# Patient Record
Sex: FEMALE | Race: WHITE | NOT HISPANIC OR LATINO | Employment: OTHER | URBAN - METROPOLITAN AREA
[De-identification: names, ages, dates, MRNs, and addresses within clinical notes are randomized per-mention and may not be internally consistent; named-entity substitution may affect disease eponyms.]

---

## 2023-04-04 ENCOUNTER — NURSING HOME VISIT (OUTPATIENT)
Dept: POST ACUTE CARE | Facility: EXTERNAL LOCATION | Age: 68
End: 2023-04-04
Payer: MEDICARE

## 2023-04-04 DIAGNOSIS — R52 PAIN: ICD-10-CM
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
## 2023-04-04 DIAGNOSIS — F32.A DEPRESSION, UNSPECIFIED DEPRESSION TYPE: ICD-10-CM

## 2023-04-04 DIAGNOSIS — F41.9 ANXIETY: ICD-10-CM

## 2023-04-04 DIAGNOSIS — S82.161D CLOSED TORUS FRACTURE OF PROXIMAL END OF RIGHT TIBIA WITH ROUTINE HEALING, SUBSEQUENT ENCOUNTER: ICD-10-CM

## 2023-04-04 DIAGNOSIS — J41.0 SIMPLE CHRONIC BRONCHITIS (MULTI): ICD-10-CM

## 2023-04-04 PROCEDURE — 99305 1ST NF CARE MODERATE MDM 35: CPT | Performed by: STUDENT IN AN ORGANIZED HEALTH CARE EDUCATION/TRAINING PROGRAM

## 2023-04-04 NOTE — LETTER
Patient: Mary Marsh  : 1955    Encounter Date: 2023    H&P    Subjective  Patient ID: Mary Marsh is a 67 y.o. female.    Pt is a 67 year old female with COPD, HTN, GERD, ambulatory dysfunction, HLD who presented to [facility] s/p fall. Patient was ambulating and noted to have pain in her knee. Felt a popping sensation. Did present herself to the ED however was discharged home. Patient was then ambulating with her walker again and then had a mechanical fall. Presented to the ED and then was found to have a hairline fracture of the right tibia. Patient was deemed non operative per orthopedics and recommended knee immobilizer. Patient was then advised snf and discharged in stable condition. On evaluation, patient overall feels well without any complaints. States that she feels her pain is well controlled and states no additional issues at this time.         Review of Systems   Constitutional: Negative.    HENT: Negative.     Respiratory: Negative.     Cardiovascular: Negative.    Gastrointestinal: Negative.    Musculoskeletal:  Positive for arthralgias.   Neurological: Negative.    Psychiatric/Behavioral: Negative.         ObjectiveVitals Reviewed via facility EMR   Physical Exam  Constitutional:       General: She is not in acute distress.     Appearance: She is not ill-appearing.   HENT:      Mouth/Throat:      Mouth: Mucous membranes are moist.      Pharynx: Oropharynx is clear. No oropharyngeal exudate or posterior oropharyngeal erythema.   Eyes:      Pupils: Pupils are equal, round, and reactive to light.   Cardiovascular:      Rate and Rhythm: Normal rate and regular rhythm.      Heart sounds: No murmur heard.  Pulmonary:      Effort: Pulmonary effort is normal. No respiratory distress.      Breath sounds: No wheezing.   Abdominal:      General: Abdomen is flat. Bowel sounds are normal. There is no distension.      Palpations: Abdomen is soft.      Tenderness: There is no abdominal  tenderness.   Musculoskeletal:         General: No swelling or tenderness. Normal range of motion.      Comments: In knee immbolilizer   Skin:     General: Skin is warm and dry.   Neurological:      General: No focal deficit present.      Mental Status: She is alert and oriented to person, place, and time. Mental status is at baseline.   Psychiatric:         Mood and Affect: Mood normal.         Assessment/Plan  Diagnoses and all orders for this visit:  Fall, sequela  Closed torus fracture of proximal end of right tibia with routine healing, subsequent encounter  Pain  Depression, unspecified depression type  Anxiety  Simple chronic bronchitis (CMS/HCC)  Patient seen and evaluated, hospital course reviewed. Patient overall doing well s/p hospitalization. Continue Pt/OT, monitor weekly labs, overall stable at this time, medications reviewed, discussed with ANT        Electronically Signed By: Anupam oV DO   4/5/23  8:01 PM

## 2023-04-05 PROBLEM — F41.9 ANXIETY: Status: ACTIVE | Noted: 2023-04-05

## 2023-04-05 PROBLEM — S82.101D CLOSED FRACTURE OF PROXIMAL END OF RIGHT TIBIA WITH ROUTINE HEALING: Status: ACTIVE | Noted: 2023-04-05

## 2023-04-05 PROBLEM — R52 PAIN: Status: ACTIVE | Noted: 2023-04-05

## 2023-04-05 PROBLEM — J41.0 SIMPLE CHRONIC BRONCHITIS (MULTI): Status: ACTIVE | Noted: 2023-04-05

## 2023-04-05 PROBLEM — F32.A DEPRESSION: Status: ACTIVE | Noted: 2023-04-05
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
## 2023-04-05 ASSESSMENT — ENCOUNTER SYMPTOMS
ARTHRALGIAS: 1
GASTROINTESTINAL NEGATIVE: 1
NEUROLOGICAL NEGATIVE: 1
CONSTITUTIONAL NEGATIVE: 1
RESPIRATORY NEGATIVE: 1
CARDIOVASCULAR NEGATIVE: 1
PSYCHIATRIC NEGATIVE: 1

## 2023-04-05 NOTE — PROGRESS NOTES
H&P    Subjective   Patient ID: Mary Marsh is a 67 y.o. female.    Pt is a 67 year old female with COPD, HTN, GERD, ambulatory dysfunction, HLD who presented to [facility] s/p fall. Patient was ambulating and noted to have pain in her knee. Felt a popping sensation. Did present herself to the ED however was discharged home. Patient was then ambulating with her walker again and then had a mechanical fall. Presented to the ED and then was found to have a hairline fracture of the right tibia. Patient was deemed non operative per orthopedics and recommended knee immobilizer. Patient was then advised snf and discharged in stable condition. On evaluation, patient overall feels well without any complaints. States that she feels her pain is well controlled and states no additional issues at this time.         Review of Systems   Constitutional: Negative.    HENT: Negative.     Respiratory: Negative.     Cardiovascular: Negative.    Gastrointestinal: Negative.    Musculoskeletal:  Positive for arthralgias.   Neurological: Negative.    Psychiatric/Behavioral: Negative.         Objective Vitals Reviewed via facility EMR   Physical Exam  Constitutional:       General: She is not in acute distress.     Appearance: She is not ill-appearing.   HENT:      Mouth/Throat:      Mouth: Mucous membranes are moist.      Pharynx: Oropharynx is clear. No oropharyngeal exudate or posterior oropharyngeal erythema.   Eyes:      Pupils: Pupils are equal, round, and reactive to light.   Cardiovascular:      Rate and Rhythm: Normal rate and regular rhythm.      Heart sounds: No murmur heard.  Pulmonary:      Effort: Pulmonary effort is normal. No respiratory distress.      Breath sounds: No wheezing.   Abdominal:      General: Abdomen is flat. Bowel sounds are normal. There is no distension.      Palpations: Abdomen is soft.      Tenderness: There is no abdominal tenderness.   Musculoskeletal:         General: No swelling or tenderness.  Normal range of motion.      Comments: In knee immbolilizer   Skin:     General: Skin is warm and dry.   Neurological:      General: No focal deficit present.      Mental Status: She is alert and oriented to person, place, and time. Mental status is at baseline.   Psychiatric:         Mood and Affect: Mood normal.         Assessment/Plan   Diagnoses and all orders for this visit:  Fall, sequela  Closed torus fracture of proximal end of right tibia with routine healing, subsequent encounter  Pain  Depression, unspecified depression type  Anxiety  Simple chronic bronchitis (CMS/HCC)  Patient seen and evaluated, hospital course reviewed. Patient overall doing well s/p hospitalization. Continue Pt/OT, monitor weekly labs, overall stable at this time, medications reviewed, discussed with ANT

## 2023-04-12 ENCOUNTER — NURSING HOME VISIT (OUTPATIENT)
Dept: POST ACUTE CARE | Facility: EXTERNAL LOCATION | Age: 68
End: 2023-04-12
Payer: MEDICARE

## 2023-04-12 DIAGNOSIS — S82.161D CLOSED TORUS FRACTURE OF PROXIMAL END OF RIGHT TIBIA WITH ROUTINE HEALING, SUBSEQUENT ENCOUNTER: ICD-10-CM

## 2023-04-12 DIAGNOSIS — R52 PAIN: Primary | ICD-10-CM

## 2023-04-12 DIAGNOSIS — F41.9 ANXIETY: ICD-10-CM
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
## 2023-04-12 PROCEDURE — 99308 SBSQ NF CARE LOW MDM 20: CPT | Performed by: STUDENT IN AN ORGANIZED HEALTH CARE EDUCATION/TRAINING PROGRAM

## 2023-04-12 ASSESSMENT — ENCOUNTER SYMPTOMS
CONSTITUTIONAL NEGATIVE: 1
GASTROINTESTINAL NEGATIVE: 1
MUSCULOSKELETAL NEGATIVE: 1
RESPIRATORY NEGATIVE: 1
NEUROLOGICAL NEGATIVE: 1
CARDIOVASCULAR NEGATIVE: 1
PSYCHIATRIC NEGATIVE: 1

## 2023-04-12 NOTE — LETTER
Patient: Mary Marsh  : 1955    Encounter Date: 2023    Subjective  Patient ID: Mary Marsh is a 67 y.o. female.    Pt seen and evaluated. Overall feels well, pain well controlled and feels overall well. Working well with therapy. States no other complaints at this time.         Review of Systems   Constitutional: Negative.    HENT: Negative.     Respiratory: Negative.     Cardiovascular: Negative.    Gastrointestinal: Negative.    Musculoskeletal: Negative.    Neurological: Negative.    Psychiatric/Behavioral: Negative.         ObjectiveVitals Reviewed via facility EMR   Physical Exam  Constitutional:       General: She is not in acute distress.     Appearance: She is not ill-appearing.   HENT:      Mouth/Throat:      Mouth: Mucous membranes are moist.      Pharynx: Oropharynx is clear. No oropharyngeal exudate or posterior oropharyngeal erythema.   Eyes:      Pupils: Pupils are equal, round, and reactive to light.   Cardiovascular:      Rate and Rhythm: Normal rate and regular rhythm.      Heart sounds: No murmur heard.  Pulmonary:      Effort: Pulmonary effort is normal. No respiratory distress.      Breath sounds: No wheezing.   Abdominal:      General: Abdomen is flat. Bowel sounds are normal. There is no distension.      Palpations: Abdomen is soft.      Tenderness: There is no abdominal tenderness.   Musculoskeletal:         General: No tenderness. Normal range of motion.      Comments: Knee imbolizer in place   Skin:     General: Skin is warm and dry.   Neurological:      General: No focal deficit present.      Mental Status: She is alert and oriented to person, place, and time. Mental status is at baseline.   Psychiatric:         Mood and Affect: Mood normal.         Assessment/Plan  Diagnoses and all orders for this visit:  Pain  Closed torus fracture of proximal end of right tibia with routine healing, subsequent encounter  Fall, sequela  Anxiety    Patient seen and evaluated,  overall doing well. Continue supportive care. Pt/OT, staff updated with care plan      Electronically Signed By: Anupam Vo DO   4/12/23  7:51 PM

## 2023-04-12 NOTE — PROGRESS NOTES
Subjective   Patient ID: Mary Marsh is a 67 y.o. female.    Pt seen and evaluated. Overall feels well, pain well controlled and feels overall well. Working well with therapy. States no other complaints at this time.         Review of Systems   Constitutional: Negative.    HENT: Negative.     Respiratory: Negative.     Cardiovascular: Negative.    Gastrointestinal: Negative.    Musculoskeletal: Negative.    Neurological: Negative.    Psychiatric/Behavioral: Negative.         Objective Vitals Reviewed via facility EMR   Physical Exam  Constitutional:       General: She is not in acute distress.     Appearance: She is not ill-appearing.   HENT:      Mouth/Throat:      Mouth: Mucous membranes are moist.      Pharynx: Oropharynx is clear. No oropharyngeal exudate or posterior oropharyngeal erythema.   Eyes:      Pupils: Pupils are equal, round, and reactive to light.   Cardiovascular:      Rate and Rhythm: Normal rate and regular rhythm.      Heart sounds: No murmur heard.  Pulmonary:      Effort: Pulmonary effort is normal. No respiratory distress.      Breath sounds: No wheezing.   Abdominal:      General: Abdomen is flat. Bowel sounds are normal. There is no distension.      Palpations: Abdomen is soft.      Tenderness: There is no abdominal tenderness.   Musculoskeletal:         General: No tenderness. Normal range of motion.      Comments: Knee imbolizer in place   Skin:     General: Skin is warm and dry.   Neurological:      General: No focal deficit present.      Mental Status: She is alert and oriented to person, place, and time. Mental status is at baseline.   Psychiatric:         Mood and Affect: Mood normal.         Assessment/Plan   Diagnoses and all orders for this visit:  Pain  Closed torus fracture of proximal end of right tibia with routine healing, subsequent encounter  Fall, sequela  Anxiety    Patient seen and evaluated, overall doing well. Continue supportive care. Pt/OT, staff updated with  care plan

## 2023-04-25 ENCOUNTER — NURSING HOME VISIT (OUTPATIENT)
Dept: POST ACUTE CARE | Facility: EXTERNAL LOCATION | Age: 68
End: 2023-04-25
Payer: MEDICARE

## 2023-04-25 DIAGNOSIS — S82.161D CLOSED TORUS FRACTURE OF PROXIMAL END OF RIGHT TIBIA WITH ROUTINE HEALING, SUBSEQUENT ENCOUNTER: ICD-10-CM

## 2023-04-25 DIAGNOSIS — J41.0 SIMPLE CHRONIC BRONCHITIS (MULTI): ICD-10-CM

## 2023-04-25 DIAGNOSIS — F41.9 ANXIETY: ICD-10-CM
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
## 2023-04-25 DIAGNOSIS — R52 PAIN: Primary | ICD-10-CM

## 2023-04-25 DIAGNOSIS — F32.A DEPRESSION, UNSPECIFIED DEPRESSION TYPE: ICD-10-CM

## 2023-04-25 PROCEDURE — 99308 SBSQ NF CARE LOW MDM 20: CPT | Performed by: STUDENT IN AN ORGANIZED HEALTH CARE EDUCATION/TRAINING PROGRAM

## 2023-04-25 ASSESSMENT — ENCOUNTER SYMPTOMS
NEUROLOGICAL NEGATIVE: 1
CARDIOVASCULAR NEGATIVE: 1
MUSCULOSKELETAL NEGATIVE: 1
CONSTITUTIONAL NEGATIVE: 1
PSYCHIATRIC NEGATIVE: 1
GASTROINTESTINAL NEGATIVE: 1
RESPIRATORY NEGATIVE: 1

## 2023-04-25 NOTE — LETTER
Patient: Mary Marsh  : 1955    Encounter Date: 2023    Subjective  Patient ID: Mary Marsh is a 67 y.o. female.    Patient seen and evaluated.  Overall doing well with no complaints.  Working well with therapy.  States no additional issues or concerns.        Review of Systems   Constitutional: Negative.    HENT: Negative.     Respiratory: Negative.     Cardiovascular: Negative.    Gastrointestinal: Negative.    Musculoskeletal: Negative.    Neurological: Negative.    Psychiatric/Behavioral: Negative.         ObjectiveVitals Reviewed via facility EMR   Physical Exam  Constitutional:       General: She is not in acute distress.     Appearance: She is not ill-appearing.   HENT:      Mouth/Throat:      Mouth: Mucous membranes are moist.      Pharynx: Oropharynx is clear. No oropharyngeal exudate or posterior oropharyngeal erythema.   Eyes:      Pupils: Pupils are equal, round, and reactive to light.   Cardiovascular:      Rate and Rhythm: Normal rate and regular rhythm.      Heart sounds: No murmur heard.  Pulmonary:      Effort: Pulmonary effort is normal. No respiratory distress.      Breath sounds: No wheezing.   Abdominal:      General: Abdomen is flat. Bowel sounds are normal. There is no distension.      Palpations: Abdomen is soft.      Tenderness: There is no abdominal tenderness.   Musculoskeletal:         General: No tenderness. Normal range of motion.      Comments: Knee imbolizer   Skin:     General: Skin is warm and dry.   Neurological:      General: No focal deficit present.      Mental Status: She is alert and oriented to person, place, and time. Mental status is at baseline.   Psychiatric:         Mood and Affect: Mood normal.         Assessment/Plan  Diagnoses and all orders for this visit:  Pain  Simple chronic bronchitis (CMS/HCC)  Closed torus fracture of proximal end of right tibia with routine healing, subsequent encounter  Fall, sequela  Depression, unspecified  depression type  Anxiety      Patient seen and evaluated, overall medically stable, labs reviewed, continue supportive care, PT OT.      Electronically Signed By: Anupam Vo DO   4/25/23  9:40 PM

## 2023-04-26 NOTE — PROGRESS NOTES
Subjective   Patient ID: Mary Marsh is a 67 y.o. female.    Patient seen and evaluated.  Overall doing well with no complaints.  Working well with therapy.  States no additional issues or concerns.        Review of Systems   Constitutional: Negative.    HENT: Negative.     Respiratory: Negative.     Cardiovascular: Negative.    Gastrointestinal: Negative.    Musculoskeletal: Negative.    Neurological: Negative.    Psychiatric/Behavioral: Negative.         Objective Vitals Reviewed via facility EMR   Physical Exam  Constitutional:       General: She is not in acute distress.     Appearance: She is not ill-appearing.   HENT:      Mouth/Throat:      Mouth: Mucous membranes are moist.      Pharynx: Oropharynx is clear. No oropharyngeal exudate or posterior oropharyngeal erythema.   Eyes:      Pupils: Pupils are equal, round, and reactive to light.   Cardiovascular:      Rate and Rhythm: Normal rate and regular rhythm.      Heart sounds: No murmur heard.  Pulmonary:      Effort: Pulmonary effort is normal. No respiratory distress.      Breath sounds: No wheezing.   Abdominal:      General: Abdomen is flat. Bowel sounds are normal. There is no distension.      Palpations: Abdomen is soft.      Tenderness: There is no abdominal tenderness.   Musculoskeletal:         General: No tenderness. Normal range of motion.      Comments: Knee imbolizer   Skin:     General: Skin is warm and dry.   Neurological:      General: No focal deficit present.      Mental Status: She is alert and oriented to person, place, and time. Mental status is at baseline.   Psychiatric:         Mood and Affect: Mood normal.         Assessment/Plan   Diagnoses and all orders for this visit:  Pain  Simple chronic bronchitis (CMS/HCC)  Closed torus fracture of proximal end of right tibia with routine healing, subsequent encounter  Fall, sequela  Depression, unspecified depression type  Anxiety      Patient seen and evaluated, overall medically  stable, labs reviewed, continue supportive care, PT OT.

## 2023-05-10 PROCEDURE — G0180 MD CERTIFICATION HHA PATIENT: HCPCS | Performed by: STUDENT IN AN ORGANIZED HEALTH CARE EDUCATION/TRAINING PROGRAM

## 2023-05-11 ENCOUNTER — TELEPHONE (OUTPATIENT)
Dept: PRIMARY CARE | Facility: CLINIC | Age: 68
End: 2023-05-11
Payer: MEDICARE

## 2023-05-11 NOTE — TELEPHONE ENCOUNTER
Says she is recently home from [facility]  Says she need a referral to pulmonary and has question about her bloodwork  [phone]

## 2023-05-12 DIAGNOSIS — R06.02 SHORTNESS OF BREATH: Primary | ICD-10-CM

## 2023-05-15 ENCOUNTER — TELEPHONE (OUTPATIENT)
Dept: PRIMARY CARE | Facility: CLINIC | Age: 68
End: 2023-05-15
Payer: MEDICARE

## 2023-05-15 DIAGNOSIS — I10 HYPERTENSION, UNSPECIFIED TYPE: Primary | ICD-10-CM

## 2023-05-15 NOTE — TELEPHONE ENCOUNTER
Was at [facility]. She has bleeding with bowels. What were her stool results.  Oxygen and Heart rate have been down but ortho said she was ok. Should she make appt with pulmonary?

## 2023-05-17 NOTE — TELEPHONE ENCOUNTER
Missed your call from yesterday and would llike for you  to call back and after 3:30pm. [phone]

## 2023-05-25 RX ORDER — BENAZEPRIL HYDROCHLORIDE 40 MG/1
1 TABLET ORAL DAILY
COMMUNITY
Start: 2014-02-14 | End: 2023-05-25 | Stop reason: SDUPTHER

## 2023-05-26 RX ORDER — BENAZEPRIL HYDROCHLORIDE 40 MG/1
40 TABLET ORAL DAILY
Qty: 90 TABLET | Refills: 1 | Status: SHIPPED | OUTPATIENT
Start: 2023-05-26 | End: 2023-11-13

## 2023-07-20 ENCOUNTER — OFFICE VISIT (OUTPATIENT)
Dept: PRIMARY CARE | Facility: CLINIC | Age: 68
End: 2023-07-20
Payer: MEDICARE

## 2023-07-20 VITALS — SYSTOLIC BLOOD PRESSURE: 122 MMHG | DIASTOLIC BLOOD PRESSURE: 70 MMHG

## 2023-07-20 DIAGNOSIS — D64.9 ANEMIA, UNSPECIFIED TYPE: ICD-10-CM

## 2023-07-20 DIAGNOSIS — G62.9 NEUROPATHY: Primary | ICD-10-CM

## 2023-07-20 DIAGNOSIS — L29.9 PRURITUS, UNSPECIFIED: ICD-10-CM

## 2023-07-20 DIAGNOSIS — I10 HYPERTENSION, UNSPECIFIED TYPE: ICD-10-CM

## 2023-07-20 DIAGNOSIS — L29.9 PRURITUS: ICD-10-CM

## 2023-07-20 DIAGNOSIS — N39.498 OTHER URINARY INCONTINENCE: ICD-10-CM

## 2023-07-20 PROBLEM — M25.551 HIP PAIN, RIGHT: Status: ACTIVE | Noted: 2023-07-20

## 2023-07-20 PROBLEM — M12.579 TRAUMATIC ARTHROPATHY OF ANKLE: Status: ACTIVE | Noted: 2023-07-20

## 2023-07-20 PROBLEM — K13.79 MOUTH SORE: Status: ACTIVE | Noted: 2023-07-20

## 2023-07-20 PROBLEM — R63.4 WEIGHT LOSS: Status: ACTIVE | Noted: 2023-07-20

## 2023-07-20 PROBLEM — L73.9 FOLLICULITIS: Status: ACTIVE | Noted: 2023-07-20

## 2023-07-20 PROBLEM — D12.6 ADENOMA OF COLON: Status: ACTIVE | Noted: 2023-07-20

## 2023-07-20 PROBLEM — R79.89 HIGH SERUM CHOLESTANOL: Status: ACTIVE | Noted: 2023-07-20

## 2023-07-20 PROBLEM — E55.9 VITAMIN D DEFICIENCY: Status: ACTIVE | Noted: 2023-07-20

## 2023-07-20 PROBLEM — Q78.2 BONY SCLEROSIS (HHS-HCC): Status: ACTIVE | Noted: 2023-07-20

## 2023-07-20 PROBLEM — S82.209A TIBIA FRACTURE: Status: ACTIVE | Noted: 2023-07-20

## 2023-07-20 PROBLEM — M84.369A STRESS REACTION OF TIBIA: Status: ACTIVE | Noted: 2023-07-20

## 2023-07-20 PROBLEM — E87.6 HYPOKALEMIA: Status: ACTIVE | Noted: 2023-07-20

## 2023-07-20 PROBLEM — J44.9 COPD (CHRONIC OBSTRUCTIVE PULMONARY DISEASE) (MULTI): Status: ACTIVE | Noted: 2023-07-20

## 2023-07-20 PROBLEM — R22.31 MASS OF RIGHT AXILLA: Status: ACTIVE | Noted: 2023-07-20

## 2023-07-20 PROBLEM — M79.606 LEG PAIN: Status: ACTIVE | Noted: 2023-07-20

## 2023-07-20 PROBLEM — M25.569 KNEE PAIN: Status: ACTIVE | Noted: 2023-07-20

## 2023-07-20 PROBLEM — R19.7 DIARRHEA FOLLOWING GASTROINTESTINAL SURGERY: Status: ACTIVE | Noted: 2023-07-20

## 2023-07-20 PROBLEM — D35.00 ADRENAL ADENOMA: Status: ACTIVE | Noted: 2023-07-20

## 2023-07-20 PROBLEM — R68.81 EARLY SATIETY: Status: ACTIVE | Noted: 2023-07-20

## 2023-07-20 PROBLEM — F43.0 STRESS REACTION: Status: ACTIVE | Noted: 2023-07-20

## 2023-07-20 PROBLEM — R06.00 DYSPNEA: Status: ACTIVE | Noted: 2023-07-20

## 2023-07-20 PROBLEM — R26.2 AMBULATORY DYSFUNCTION: Status: ACTIVE | Noted: 2023-07-20

## 2023-07-20 PROBLEM — R05.9 COUGH: Status: ACTIVE | Noted: 2023-07-20

## 2023-07-20 PROBLEM — M84.361A STRESS FRACTURE OF RIGHT TIBIA: Status: ACTIVE | Noted: 2023-07-20

## 2023-07-20 PROBLEM — M54.16 LUMBAR RADICULOPATHY: Status: ACTIVE | Noted: 2023-07-20

## 2023-07-20 PROBLEM — R10.9 ABDOMINAL PAIN: Status: ACTIVE | Noted: 2023-07-20

## 2023-07-20 PROBLEM — Z98.890 DIARRHEA FOLLOWING GASTROINTESTINAL SURGERY: Status: ACTIVE | Noted: 2023-07-20

## 2023-07-20 PROBLEM — R07.89 CHEST PAIN, ATYPICAL: Status: ACTIVE | Noted: 2023-07-20

## 2023-07-20 PROBLEM — M17.10 PRIMARY LOCALIZED OSTEOARTHRITIS OF KNEE: Status: ACTIVE | Noted: 2023-07-20

## 2023-07-20 PROCEDURE — 99214 OFFICE O/P EST MOD 30 MIN: CPT | Performed by: STUDENT IN AN ORGANIZED HEALTH CARE EDUCATION/TRAINING PROGRAM

## 2023-07-20 PROCEDURE — 1036F TOBACCO NON-USER: CPT | Performed by: STUDENT IN AN ORGANIZED HEALTH CARE EDUCATION/TRAINING PROGRAM

## 2023-07-20 PROCEDURE — 3078F DIAST BP <80 MM HG: CPT | Performed by: STUDENT IN AN ORGANIZED HEALTH CARE EDUCATION/TRAINING PROGRAM

## 2023-07-20 PROCEDURE — 1160F RVW MEDS BY RX/DR IN RCRD: CPT | Performed by: STUDENT IN AN ORGANIZED HEALTH CARE EDUCATION/TRAINING PROGRAM

## 2023-07-20 PROCEDURE — 3074F SYST BP LT 130 MM HG: CPT | Performed by: STUDENT IN AN ORGANIZED HEALTH CARE EDUCATION/TRAINING PROGRAM

## 2023-07-20 PROCEDURE — 1159F MED LIST DOCD IN RCRD: CPT | Performed by: STUDENT IN AN ORGANIZED HEALTH CARE EDUCATION/TRAINING PROGRAM

## 2023-07-20 RX ORDER — FLUOXETINE HYDROCHLORIDE 20 MG/1
CAPSULE ORAL
COMMUNITY

## 2023-07-20 RX ORDER — VALACYCLOVIR HYDROCHLORIDE 1 G/1
TABLET, FILM COATED ORAL
COMMUNITY
Start: 2022-01-07 | End: 2023-07-20 | Stop reason: ALTCHOICE

## 2023-07-20 RX ORDER — QUETIAPINE FUMARATE 25 MG/1
TABLET, FILM COATED ORAL
COMMUNITY
End: 2023-07-20 | Stop reason: ALTCHOICE

## 2023-07-20 RX ORDER — FERROUS SULFATE 325(65) MG
1 TABLET ORAL DAILY
COMMUNITY
End: 2023-08-25

## 2023-07-20 RX ORDER — OMEPRAZOLE 40 MG/1
1 CAPSULE, DELAYED RELEASE ORAL DAILY
COMMUNITY
Start: 2022-01-31 | End: 2023-07-20 | Stop reason: ALTCHOICE

## 2023-07-20 RX ORDER — HYDROCORTISONE ACETATE PRAMOXINE HCL 2.5; 1 G/100G; G/100G
CREAM TOPICAL
COMMUNITY
Start: 2023-06-20 | End: 2023-07-20 | Stop reason: ALTCHOICE

## 2023-07-20 RX ORDER — LORAZEPAM 1 MG/1
1 TABLET ORAL 2 TIMES DAILY
COMMUNITY

## 2023-07-20 RX ORDER — LAMOTRIGINE 150 MG/1
1 TABLET ORAL DAILY
COMMUNITY

## 2023-07-20 RX ORDER — OXYCODONE AND ACETAMINOPHEN 5; 325 MG/1; MG/1
TABLET ORAL
COMMUNITY
Start: 2023-05-04 | End: 2023-07-20 | Stop reason: ALTCHOICE

## 2023-07-20 RX ORDER — TOPIRAMATE 50 MG/1
TABLET, FILM COATED ORAL 2 TIMES DAILY
COMMUNITY
End: 2023-07-20 | Stop reason: ALTCHOICE

## 2023-07-20 RX ORDER — ERGOCALCIFEROL 1.25 MG/1
1 CAPSULE ORAL
COMMUNITY
End: 2023-07-28

## 2023-07-20 RX ORDER — PANTOPRAZOLE SODIUM 40 MG/1
1 TABLET, DELAYED RELEASE ORAL DAILY
COMMUNITY
Start: 2022-02-02 | End: 2023-09-18 | Stop reason: SDUPTHER

## 2023-07-20 RX ORDER — POTASSIUM CHLORIDE 1500 MG/1
1 TABLET, EXTENDED RELEASE ORAL DAILY
COMMUNITY
Start: 2019-03-28 | End: 2023-12-15 | Stop reason: SDUPTHER

## 2023-07-20 RX ORDER — GABAPENTIN 100 MG/1
100 CAPSULE ORAL NIGHTLY
Qty: 90 CAPSULE | Refills: 0 | Status: SHIPPED | OUTPATIENT
Start: 2023-07-20 | End: 2023-10-18

## 2023-07-20 RX ORDER — MIRTAZAPINE 15 MG/1
0.5 TABLET, FILM COATED ORAL NIGHTLY
COMMUNITY
Start: 2021-06-08 | End: 2023-07-20 | Stop reason: ALTCHOICE

## 2023-07-20 RX ORDER — POTASSIUM CHLORIDE 1500 MG/1
TABLET, EXTENDED RELEASE ORAL
COMMUNITY
Start: 2022-02-02 | End: 2024-03-05 | Stop reason: SDUPTHER

## 2023-07-20 RX ORDER — ACETAMINOPHEN 325 MG/1
TABLET ORAL EVERY 6 HOURS
COMMUNITY
Start: 2019-06-13

## 2023-07-20 RX ORDER — MIRTAZAPINE 7.5 MG/1
TABLET, FILM COATED ORAL
COMMUNITY
Start: 2023-06-06

## 2023-07-20 RX ORDER — AMLODIPINE BESYLATE 5 MG/1
5 TABLET ORAL DAILY
Qty: 90 TABLET | Refills: 0 | Status: SHIPPED | OUTPATIENT
Start: 2023-07-20 | End: 2024-03-06 | Stop reason: ALTCHOICE

## 2023-07-20 RX ORDER — DICYCLOMINE HYDROCHLORIDE 10 MG/1
1 CAPSULE ORAL EVERY 6 HOURS PRN
COMMUNITY
Start: 2021-06-07 | End: 2023-11-07 | Stop reason: SDUPTHER

## 2023-07-20 RX ORDER — TOPIRAMATE SPINKLE 25 MG/1
CAPSULE ORAL
COMMUNITY
End: 2023-08-06

## 2023-07-20 ASSESSMENT — ENCOUNTER SYMPTOMS
DIZZINESS: 0
FEVER: 0
SORE THROAT: 0
COUGH: 0
ABDOMINAL PAIN: 0
VOMITING: 0
JOINT SWELLING: 0
EYE PAIN: 0
ROS SKIN COMMENTS: ITCHING
CHILLS: 0
DYSURIA: 0
EYE ITCHING: 1
NERVOUS/ANXIOUS: 1
HEADACHES: 1
SHORTNESS OF BREATH: 0
LIGHT-HEADEDNESS: 0

## 2023-07-20 NOTE — PROGRESS NOTES
"Subjective   Patient ID: Mary Marsh is a 67 y.o. female who presents for Follow-up and Med Refill.    Patient presents for follow up and med refill. Son present. She mentions of \"burning\" all over and occasional episodes of generalized pruritus. This has been going on for some time. She has not tried any medications for this. Denies hives, rash. She also mentions of nocturia with 3-4 episodes per night. Patient experiences urgency, as in the feeling that she needs to get to the bathroom quickly. She wears depends in case. She has intermittent anxiety. Denies recent falls. Denies dysuria, fever. She develops a headache about 15 minutes after taking her blood pressure medications. Patient takes advil with relief of her headache. Denies lightheadedness, dizziness, syncope.     Review of Systems   Constitutional:  Negative for chills and fever.   HENT:  Negative for ear pain and sore throat.    Eyes:  Positive for itching. Negative for pain.   Respiratory:  Negative for cough and shortness of breath.    Gastrointestinal:  Negative for abdominal pain and vomiting.   Genitourinary:  Positive for urgency. Negative for dysuria.        Nocturia   Musculoskeletal:  Negative for joint swelling.   Skin:  Negative for rash.        itching   Neurological:  Positive for headaches. Negative for dizziness, syncope and light-headedness.   Psychiatric/Behavioral:  The patient is nervous/anxious.    All other systems reviewed and are negative.      Objective   /70     Physical Exam  Vitals and nursing note reviewed.   Constitutional:       General: She is not in acute distress.     Appearance: She is not ill-appearing or toxic-appearing.   HENT:      Head: Normocephalic and atraumatic.      Right Ear: External ear normal.      Left Ear: External ear normal.      Mouth/Throat:      Mouth: Mucous membranes are moist.   Eyes:      Conjunctiva/sclera: Conjunctivae normal.   Cardiovascular:      Rate and Rhythm: Normal rate " and regular rhythm.      Heart sounds: Normal heart sounds.   Pulmonary:      Effort: Pulmonary effort is normal.      Breath sounds: Normal breath sounds.   Abdominal:      General: There is no distension.      Palpations: Abdomen is soft.   Musculoskeletal:      Cervical back: Neck supple.      Comments: Boot on RLE   Skin:     General: Skin is warm and dry.      Findings: No rash.   Neurological:      Mental Status: She is alert and oriented to person, place, and time.   Psychiatric:         Behavior: Behavior normal.         Assessment/Plan   Problem List Items Addressed This Visit          Hematology and Neoplasia    Anemia     Other Visit Diagnoses       Neuropathy    -  Primary    Other urinary incontinence                Urinary incontinence  -Urology referral    Right tib/fib fracture  -Following with ortho     Adrenal nodule  -Extensive workup completed. CT of the A/P did show an nodule and had dedicated CT of the adrenal and followup with endocrine.      COPD  -Follows with Dr. Bonilla, encourage follow-up as she has not done so     Hypertension  -Continue amlodipine and benazepril     Mental health issues  -Follows with a psychiatrist who provides her fluoxetine, lamotrigine, lorazepam, mirtazapine     GERD  -Continue omeprazole     DLD  -Continue fenofibrate     Migraines  -Continue topiramate     Health maintenance  -Colonoscopy in 2020, repeat in 2030  -Mammogram done 2022, says she did one this year, unable to find  -Encouraged age-appropriate vaccinations     Follow up and med rfill    RTC in 6 months    Pt seen and evaluated, discussed with resident, encoruage continued fu with psych, having worsening neuropathy, check labs, iron studies, b12, could warrant low dose gabapentin but concerns for falls with medications, continue therapy, recommend fu with urogyn

## 2023-08-04 DIAGNOSIS — Z00.00 ENCOUNTER FOR GENERAL ADULT MEDICAL EXAMINATION WITHOUT ABNORMAL FINDINGS: ICD-10-CM

## 2023-08-06 RX ORDER — TOPIRAMATE SPINKLE 25 MG/1
50 CAPSULE ORAL 2 TIMES DAILY
Qty: 360 CAPSULE | Refills: 0 | Status: SHIPPED | OUTPATIENT
Start: 2023-08-06 | End: 2023-10-16

## 2023-08-16 ENCOUNTER — TELEPHONE (OUTPATIENT)
Dept: PRIMARY CARE | Facility: CLINIC | Age: 68
End: 2023-08-16
Payer: MEDICARE

## 2023-08-18 DIAGNOSIS — K13.79 MOUTH SORES: Primary | ICD-10-CM

## 2023-08-18 RX ORDER — CHLORHEXIDINE GLUCONATE ORAL RINSE 1.2 MG/ML
15 SOLUTION DENTAL AS NEEDED
Qty: 473 ML | Refills: 0 | Status: SHIPPED | OUTPATIENT
Start: 2023-08-18 | End: 2023-09-01

## 2023-08-23 PROBLEM — L84 PRE-ULCERATIVE CALLUSES: Status: ACTIVE | Noted: 2017-09-28

## 2023-08-23 PROBLEM — M20.41 HAMMERTOE OF RIGHT FOOT: Status: ACTIVE | Noted: 2017-09-28

## 2023-08-23 RX ORDER — TRIAMCINOLONE ACETONIDE 1 MG/G
1 PASTE DENTAL 2 TIMES DAILY
COMMUNITY
Start: 2019-02-07 | End: 2023-12-15 | Stop reason: ALTCHOICE

## 2023-08-23 RX ORDER — QUETIAPINE FUMARATE 25 MG/1
25 TABLET, FILM COATED ORAL
COMMUNITY
End: 2023-12-15 | Stop reason: ALTCHOICE

## 2023-08-23 RX ORDER — IBUPROFEN 200 MG
200 TABLET ORAL EVERY 6 HOURS PRN
COMMUNITY
End: 2023-12-23 | Stop reason: HOSPADM

## 2023-08-23 RX ORDER — AMLODIPINE BESYLATE 10 MG/1
10 TABLET ORAL
COMMUNITY
Start: 2018-11-19 | End: 2024-01-23 | Stop reason: SDUPTHER

## 2023-08-23 RX ORDER — OMEPRAZOLE 20 MG/1
20 CAPSULE, DELAYED RELEASE ORAL
COMMUNITY
Start: 2019-01-24 | End: 2023-12-15 | Stop reason: ALTCHOICE

## 2023-08-23 RX ORDER — PALIPERIDONE 9 MG/1
6 TABLET, EXTENDED RELEASE ORAL
COMMUNITY
End: 2023-12-15 | Stop reason: ALTCHOICE

## 2023-08-23 RX ORDER — CALCIUM CARBONATE/VITAMIN D3 600MG-5MCG
1 TABLET ORAL
COMMUNITY

## 2023-08-23 RX ORDER — MECLIZINE HYDROCHLORIDE 25 MG/1
25 TABLET ORAL 3 TIMES DAILY PRN
COMMUNITY
Start: 2017-11-29 | End: 2023-12-15 | Stop reason: ALTCHOICE

## 2023-08-23 RX ORDER — HYOSCYAMINE SULFATE 0.125 MG
0.12 TABLET ORAL EVERY 6 HOURS PRN
COMMUNITY
Start: 2017-04-25 | End: 2023-12-15 | Stop reason: ALTCHOICE

## 2023-08-23 RX ORDER — GUAIFENESIN 200 MG/1
200 TABLET ORAL
COMMUNITY
Start: 2020-01-22 | End: 2023-12-15 | Stop reason: ALTCHOICE

## 2023-08-23 RX ORDER — CLOMIPRAMINE HYDROCHLORIDE 25 MG/1
25 CAPSULE ORAL
COMMUNITY
End: 2023-12-15 | Stop reason: ALTCHOICE

## 2023-08-23 RX ORDER — DOCUSATE SODIUM 100 MG/1
100 CAPSULE, LIQUID FILLED ORAL
COMMUNITY
Start: 2013-12-22 | End: 2023-12-15 | Stop reason: ALTCHOICE

## 2023-08-23 RX ORDER — ASPIRIN 81 MG/1
81 TABLET ORAL
COMMUNITY
End: 2023-12-23 | Stop reason: HOSPADM

## 2023-08-23 RX ORDER — DOXYCYCLINE HYCLATE 100 MG
TABLET ORAL 2 TIMES DAILY
COMMUNITY
Start: 2020-01-22

## 2023-08-23 RX ORDER — FUROSEMIDE 20 MG/1
20 TABLET ORAL DAILY
COMMUNITY
Start: 2018-02-09 | End: 2023-12-15 | Stop reason: ALTCHOICE

## 2023-08-23 RX ORDER — RISPERIDONE 2 MG/1
2 TABLET ORAL 2 TIMES DAILY
COMMUNITY
Start: 2018-10-20 | End: 2023-12-15 | Stop reason: ALTCHOICE

## 2023-08-23 RX ORDER — MIRTAZAPINE 15 MG/1
15 TABLET, FILM COATED ORAL NIGHTLY
COMMUNITY
Start: 2021-01-05 | End: 2023-12-15 | Stop reason: DRUGHIGH

## 2023-08-23 RX ORDER — GLYCOPYRROLATE AND FORMOTEROL FUMARATE 9; 4.8 UG/1; UG/1
1 AEROSOL, METERED RESPIRATORY (INHALATION) 2 TIMES DAILY
COMMUNITY
Start: 2019-09-16 | End: 2023-12-15 | Stop reason: ALTCHOICE

## 2023-08-23 RX ORDER — HYDROCORTISONE ACETATE PRAMOXINE HCL 2.5; 1 G/100G; G/100G
CREAM TOPICAL
COMMUNITY
Start: 2020-01-15 | End: 2023-12-15 | Stop reason: ALTCHOICE

## 2023-08-23 RX ORDER — DEXAMETHASONE 1 MG/1
1 TABLET ORAL
COMMUNITY
Start: 2020-08-25 | End: 2023-12-15 | Stop reason: ALTCHOICE

## 2023-09-07 ENCOUNTER — LAB (OUTPATIENT)
Dept: LAB | Facility: LAB | Age: 68
End: 2023-09-07
Payer: MEDICARE

## 2023-09-07 DIAGNOSIS — G62.9 NEUROPATHY: ICD-10-CM

## 2023-09-07 DIAGNOSIS — D64.9 ANEMIA, UNSPECIFIED TYPE: ICD-10-CM

## 2023-09-07 DIAGNOSIS — L29.9 PRURITUS, UNSPECIFIED: ICD-10-CM

## 2023-09-07 DIAGNOSIS — N39.498 OTHER URINARY INCONTINENCE: ICD-10-CM

## 2023-09-07 DIAGNOSIS — L29.9 PRURITUS: ICD-10-CM

## 2023-09-07 LAB
ALANINE AMINOTRANSFERASE (SGPT) (U/L) IN SER/PLAS: 25 U/L (ref 7–45)
ALBUMIN (G/DL) IN SER/PLAS: 4.7 G/DL (ref 3.4–5)
ALKALINE PHOSPHATASE (U/L) IN SER/PLAS: 64 U/L (ref 33–136)
ANION GAP IN SER/PLAS: 14 MMOL/L (ref 10–20)
APPEARANCE, URINE: ABNORMAL
ASPARTATE AMINOTRANSFERASE (SGOT) (U/L) IN SER/PLAS: 30 U/L (ref 9–39)
BACTERIA, URINE: ABNORMAL /HPF
BILIRUBIN TOTAL (MG/DL) IN SER/PLAS: 0.3 MG/DL (ref 0–1.2)
BILIRUBIN, URINE: NEGATIVE
BLOOD, URINE: NEGATIVE
CALCIUM (MG/DL) IN SER/PLAS: 10.2 MG/DL (ref 8.6–10.6)
CARBON DIOXIDE, TOTAL (MMOL/L) IN SER/PLAS: 23 MMOL/L (ref 21–32)
CHLORIDE (MMOL/L) IN SER/PLAS: 102 MMOL/L (ref 98–107)
COBALAMIN (VITAMIN B12) (PG/ML) IN SER/PLAS: 389 PG/ML (ref 211–911)
COLOR, URINE: YELLOW
CREATININE (MG/DL) IN SER/PLAS: 0.98 MG/DL (ref 0.5–1.05)
FERRITIN (UG/LL) IN SER/PLAS: 29 UG/L (ref 8–150)
GFR FEMALE: 63 ML/MIN/1.73M2
GLUCOSE (MG/DL) IN SER/PLAS: 82 MG/DL (ref 74–99)
GLUCOSE, URINE: NEGATIVE MG/DL
IRON (UG/DL) IN SER/PLAS: 78 UG/DL (ref 35–150)
IRON BINDING CAPACITY (UG/DL) IN SER/PLAS: >528 UG/DL (ref 240–445)
IRON SATURATION (%) IN SER/PLAS: ABNORMAL % (ref 25–45)
KETONES, URINE: NEGATIVE MG/DL
LEUKOCYTE ESTERASE, URINE: ABNORMAL
NITRITE, URINE: NEGATIVE
PH, URINE: 8 (ref 5–8)
POTASSIUM (MMOL/L) IN SER/PLAS: 4.4 MMOL/L (ref 3.5–5.3)
PROTEIN TOTAL: 7.2 G/DL (ref 6.4–8.2)
PROTEIN, URINE: NEGATIVE MG/DL
RBC, URINE: <1 /HPF (ref 0–5)
RENAL EPITHELIAL CELLS, URINE: <1 /HPF
SODIUM (MMOL/L) IN SER/PLAS: 135 MMOL/L (ref 136–145)
SPECIFIC GRAVITY, URINE: 1 (ref 1–1.03)
THYROTROPIN (MIU/L) IN SER/PLAS BY DETECTION LIMIT <= 0.05 MIU/L: 3.48 MIU/L (ref 0.44–3.98)
UREA NITROGEN (MG/DL) IN SER/PLAS: 9 MG/DL (ref 6–23)
UROBILINOGEN, URINE: <2 MG/DL (ref 0–1.9)
WBC, URINE: 11 /HPF (ref 0–5)

## 2023-09-07 PROCEDURE — 80053 COMPREHEN METABOLIC PANEL: CPT

## 2023-09-07 PROCEDURE — 82728 ASSAY OF FERRITIN: CPT

## 2023-09-07 PROCEDURE — 81001 URINALYSIS AUTO W/SCOPE: CPT

## 2023-09-07 PROCEDURE — 84443 ASSAY THYROID STIM HORMONE: CPT

## 2023-09-07 PROCEDURE — 83550 IRON BINDING TEST: CPT

## 2023-09-07 PROCEDURE — 36415 COLL VENOUS BLD VENIPUNCTURE: CPT

## 2023-09-07 PROCEDURE — 83540 ASSAY OF IRON: CPT

## 2023-09-07 PROCEDURE — 82607 VITAMIN B-12: CPT

## 2023-09-14 DIAGNOSIS — R68.2 DRY MOUTH: Primary | ICD-10-CM

## 2023-09-14 DIAGNOSIS — D64.9 ANEMIA, UNSPECIFIED TYPE: ICD-10-CM

## 2023-09-14 DIAGNOSIS — K21.9 GASTROESOPHAGEAL REFLUX DISEASE, UNSPECIFIED WHETHER ESOPHAGITIS PRESENT: ICD-10-CM

## 2023-09-14 DIAGNOSIS — I10 HYPERTENSION, UNSPECIFIED TYPE: ICD-10-CM

## 2023-09-14 RX ORDER — AMLODIPINE BESYLATE 5 MG/1
5 TABLET ORAL DAILY
Qty: 90 TABLET | Refills: 0 | OUTPATIENT
Start: 2023-09-14

## 2023-09-15 NOTE — TELEPHONE ENCOUNTER
Patient requesting lab results from last week and requesting refills on pantoprazole and asked if you could prescribe oral rinse chlorhexadine. And to send to local pharmacy.

## 2023-09-18 RX ORDER — PANTOPRAZOLE SODIUM 40 MG/1
40 TABLET, DELAYED RELEASE ORAL DAILY
Qty: 90 TABLET | Refills: 0 | Status: SHIPPED | OUTPATIENT
Start: 2023-09-18 | End: 2023-12-08 | Stop reason: SDUPTHER

## 2023-09-18 RX ORDER — CHLORHEXIDINE GLUCONATE ORAL RINSE 1.2 MG/ML
15 SOLUTION DENTAL AS NEEDED
Qty: 120 ML | Refills: 0 | Status: SHIPPED | OUTPATIENT
Start: 2023-09-18 | End: 2023-10-02

## 2023-09-19 ENCOUNTER — TELEPHONE (OUTPATIENT)
Dept: PRIMARY CARE | Facility: CLINIC | Age: 68
End: 2023-09-19
Payer: MEDICARE

## 2023-10-03 ENCOUNTER — ANCILLARY PROCEDURE (OUTPATIENT)
Dept: RADIOLOGY | Facility: CLINIC | Age: 68
End: 2023-10-03
Payer: MEDICARE

## 2023-10-03 ENCOUNTER — OFFICE VISIT (OUTPATIENT)
Dept: ORTHOPEDIC SURGERY | Facility: CLINIC | Age: 68
End: 2023-10-03
Payer: MEDICARE

## 2023-10-03 ENCOUNTER — APPOINTMENT (OUTPATIENT)
Dept: RADIOLOGY | Facility: CLINIC | Age: 68
End: 2023-10-03
Payer: MEDICARE

## 2023-10-03 DIAGNOSIS — M17.10 PRIMARY LOCALIZED OSTEOARTHRITIS OF KNEE: Primary | ICD-10-CM

## 2023-10-03 DIAGNOSIS — M25.561 PAIN IN BOTH KNEES, UNSPECIFIED CHRONICITY: ICD-10-CM

## 2023-10-03 DIAGNOSIS — M25.562 PAIN IN BOTH KNEES, UNSPECIFIED CHRONICITY: ICD-10-CM

## 2023-10-03 DIAGNOSIS — S82.201K TIBIA/FIBULA FRACTURE, SHAFT, RIGHT, CLOSED, WITH NONUNION, SUBSEQUENT ENCOUNTER: ICD-10-CM

## 2023-10-03 DIAGNOSIS — S82.401K TIBIA/FIBULA FRACTURE, SHAFT, RIGHT, CLOSED, WITH NONUNION, SUBSEQUENT ENCOUNTER: ICD-10-CM

## 2023-10-03 PROCEDURE — 73562 X-RAY EXAM OF KNEE 3: CPT | Mod: RT

## 2023-10-03 PROCEDURE — 99214 OFFICE O/P EST MOD 30 MIN: CPT | Performed by: STUDENT IN AN ORGANIZED HEALTH CARE EDUCATION/TRAINING PROGRAM

## 2023-10-03 PROCEDURE — 99204 OFFICE O/P NEW MOD 45 MIN: CPT | Performed by: STUDENT IN AN ORGANIZED HEALTH CARE EDUCATION/TRAINING PROGRAM

## 2023-10-03 PROCEDURE — 1160F RVW MEDS BY RX/DR IN RCRD: CPT | Performed by: STUDENT IN AN ORGANIZED HEALTH CARE EDUCATION/TRAINING PROGRAM

## 2023-10-03 PROCEDURE — 1036F TOBACCO NON-USER: CPT | Performed by: STUDENT IN AN ORGANIZED HEALTH CARE EDUCATION/TRAINING PROGRAM

## 2023-10-03 PROCEDURE — 73560 X-RAY EXAM OF KNEE 1 OR 2: CPT | Mod: LT,FY

## 2023-10-03 PROCEDURE — 3078F DIAST BP <80 MM HG: CPT | Performed by: STUDENT IN AN ORGANIZED HEALTH CARE EDUCATION/TRAINING PROGRAM

## 2023-10-03 PROCEDURE — 1159F MED LIST DOCD IN RCRD: CPT | Performed by: STUDENT IN AN ORGANIZED HEALTH CARE EDUCATION/TRAINING PROGRAM

## 2023-10-03 PROCEDURE — 73564 X-RAY EXAM KNEE 4 OR MORE: CPT | Mod: RIGHT SIDE | Performed by: RADIOLOGY

## 2023-10-03 PROCEDURE — 3074F SYST BP LT 130 MM HG: CPT | Performed by: STUDENT IN AN ORGANIZED HEALTH CARE EDUCATION/TRAINING PROGRAM

## 2023-10-03 NOTE — LETTER
November 5, 2023     Aly Qiu MD  [address], [address]  [place] [address]    Patient: Mary Marsh   YOB: 1955   Date of Visit: 10/3/2023       Dear Dr. Aly Qiu MD:    Thank you for referring Mary Marsh to me for evaluation. Below are my notes for this consultation.  If you have questions, please do not hesitate to call me. I look forward to following your patient along with you.       Sincerely,     Kannan Castanon MD      CC: No Recipients  ______________________________________________________________________________________    Chief complaint: Bilateral lower extremity pain    Subjective:  Mary Marsh is a 67 female (PMH emphysema, R tibial shaft fx April 2023 & ~10 years prior, L hip fracture s/p IMN, R distal tibia fracture s/p medial plate) presenting to our clinic as a c/f possible total knee arthroplasty. Patient has a history of multiple right tibia fractures, claiming a right tibial shaft fracture roughly ~10 years prior and again seen this spring for a right tibial diaphysis stress fracture. Patient was managed nonoperatively. Patient also has right sided knee osteoarthritis. Patient claims most of her pain is in his midshaft tibia. Pain is worse with knee/ankle ROM, WB, activity. Patient uses a walker as needed at baseline and has relied on it more since her most recent right tibial shaft injury.     The patient denies any fever, chills, chest pain, shortness of breath or difficulty breathing.  Patient denies any numbness, tingling, or radicular symptoms.  Adult patient history sheet was filled out by the patient today in clinic and will be scanned into the EMR.  I personally reviewed this form which will be scanned into the EMR.  This includes Past Medical History, Past Surgical History, Medications, Allergies, Social History, Family History and 12 point review of systems.     Objective:  Const: NAD.  Cooperative  HEENT: NCAT, PERRL  Cards: RRR, peripherally well perf  Pulm: Breathing comfortably, O2 sat appropriate  Abdomen: soft, non-distended  Neuro: per MSK  Psych: appropriate mood/behavior  Skin: warm, dry, remainder per MSK  MSK (RLE):  - R knee swelling, medial JLT, bakers cyst  - R knee ROM 10 - 120 flexion arc  - R knee & tibial shaft in grossly valgus deformity with external rotation of foot /ankle  - No open injury, skin breakdown, superficial lesions  - NVI    Imaging:  Bilateral knee radiographs were obtained and pending reviewed.  I discussed the results with the patient.  The patient does have tricompartmental degenerative changes in both knees.  There is evidence of joint space narrowing, subchondral sclerosis and osteophyte formation    A&P:  Mary Marsh is a 67 female (PMH emphysema, R tibial shaft fx April 2023 & ~10 years prior, L hip fracture s/p IMN, R distal tibia fracture s/p medial plate) presenting to our clinic as a c/f possible total knee arthroplasty.    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     I discussed with the patient that while her knee does demonstrate OA, it is not the primary source of her symptoms. Likewise, if I were to correct her knee with a TKA, she would remain with tibial shaft pain and deformity. Given this we would recommend patient follow up with one of my trauma colleagues for definitive management of the tibial shaft nonunion.    All patient/family questions were answered. We will give them Dr. Linda's contact information & reach out to their office to get her on the schedule.     The patient was seen and examined with my resident Dr. Brandt who assisted with documentation.  I independently interviewed the patient to obtain a history and performed physical examination.  I formulated the plan of care.    *This note was created using voice recognition software and  was not corrected for typographical or grammatical errors.*

## 2023-10-03 NOTE — PROGRESS NOTES
Chief complaint: Bilateral lower extremity pain    Subjective:  Mary Marsh is a 67 female (PMH emphysema, R tibial shaft fx April 2023 & ~10 years prior, L hip fracture s/p IMN, R distal tibia fracture s/p medial plate) presenting to our clinic as a c/f possible total knee arthroplasty. Patient has a history of multiple right tibia fractures, claiming a right tibial shaft fracture roughly ~10 years prior and again seen this spring for a right tibial diaphysis stress fracture. Patient was managed nonoperatively. Patient also has right sided knee osteoarthritis. Patient claims most of her pain is in his midshaft tibia. Pain is worse with knee/ankle ROM, WB, activity. Patient uses a walker as needed at baseline and has relied on it more since her most recent right tibial shaft injury.     The patient denies any fever, chills, chest pain, shortness of breath or difficulty breathing.  Patient denies any numbness, tingling, or radicular symptoms.  Adult patient history sheet was filled out by the patient today in clinic and will be scanned into the EMR.  I personally reviewed this form which will be scanned into the EMR.  This includes Past Medical History, Past Surgical History, Medications, Allergies, Social History, Family History and 12 point review of systems.     Objective:  Const: NAD. Cooperative  HEENT: NCAT, PERRL  Cards: RRR, peripherally well perf  Pulm: Breathing comfortably, O2 sat appropriate  Abdomen: soft, non-distended  Neuro: per MSK  Psych: appropriate mood/behavior  Skin: warm, dry, remainder per MSK  MSK (RLE):  - R knee swelling, medial JLT, bakers cyst  - R knee ROM 10 - 120 flexion arc  - R knee & tibial shaft in grossly valgus deformity with external rotation of foot /ankle  - No open injury, skin breakdown, superficial lesions  - NVI    Imaging:  Bilateral knee radiographs were obtained and pending reviewed.  I discussed the results with the patient.  The patient does have tricompartmental  degenerative changes in both knees.  There is evidence of joint space narrowing, subchondral sclerosis and osteophyte formation    A&P:  Mary Marsh is a 67 female (PMH emphysema, R tibial shaft fx April 2023 & ~10 years prior, L hip fracture s/p IMN, R distal tibia fracture s/p medial plate) presenting to our clinic as a c/f possible total knee arthroplasty.    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     I discussed with the patient that while her knee does demonstrate OA, it is not the primary source of her symptoms. Likewise, if I were to correct her knee with a TKA, she would remain with tibial shaft pain and deformity. Given this we would recommend patient follow up with one of my trauma colleagues for definitive management of the tibial shaft nonunion.    All patient/family questions were answered. We will give them Dr. Linda's contact information & reach out to their office to get her on the schedule.     The patient was seen and examined with my resident Dr. Brandt who assisted with documentation.  I independently interviewed the patient to obtain a history and performed physical examination.  I formulated the plan of care.    *This note was created using voice recognition software and was not corrected for typographical or grammatical errors.*

## 2023-11-07 DIAGNOSIS — Z00.00 HEALTHCARE MAINTENANCE: Primary | ICD-10-CM

## 2023-11-07 DIAGNOSIS — R79.89 OTHER SPECIFIED ABNORMAL FINDINGS OF BLOOD CHEMISTRY: ICD-10-CM

## 2023-11-07 RX ORDER — FENOFIBRATE 160 MG/1
160 TABLET ORAL DAILY
Qty: 90 TABLET | Refills: 0 | Status: SHIPPED | OUTPATIENT
Start: 2023-11-07 | End: 2024-05-09

## 2023-11-07 RX ORDER — DICYCLOMINE HYDROCHLORIDE 10 MG/1
10 CAPSULE ORAL EVERY 6 HOURS PRN
Qty: 60 CAPSULE | Refills: 0 | Status: SHIPPED | OUTPATIENT
Start: 2023-11-07 | End: 2023-11-15

## 2023-11-10 DIAGNOSIS — I10 HYPERTENSION, UNSPECIFIED TYPE: ICD-10-CM

## 2023-11-13 RX ORDER — BENAZEPRIL HYDROCHLORIDE 40 MG/1
40 TABLET ORAL DAILY
Qty: 90 TABLET | Refills: 1 | Status: SHIPPED | OUTPATIENT
Start: 2023-11-13 | End: 2023-11-20 | Stop reason: SDUPTHER

## 2023-11-14 DIAGNOSIS — R10.84 GENERALIZED ABDOMINAL PAIN: Primary | ICD-10-CM

## 2023-11-14 DIAGNOSIS — Z00.00 HEALTHCARE MAINTENANCE: ICD-10-CM

## 2023-11-15 RX ORDER — DICYCLOMINE HYDROCHLORIDE 10 MG/1
CAPSULE ORAL
Qty: 360 CAPSULE | Refills: 0 | Status: SHIPPED | OUTPATIENT
Start: 2023-11-15 | End: 2024-02-20

## 2023-11-21 RX ORDER — BENAZEPRIL HYDROCHLORIDE 40 MG/1
40 TABLET ORAL DAILY
Qty: 90 TABLET | Refills: 1 | Status: SHIPPED | OUTPATIENT
Start: 2023-11-21

## 2023-11-27 ENCOUNTER — OFFICE VISIT (OUTPATIENT)
Dept: ORTHOPEDIC SURGERY | Facility: HOSPITAL | Age: 68
End: 2023-11-27
Payer: MEDICARE

## 2023-11-27 ENCOUNTER — HOSPITAL ENCOUNTER (OUTPATIENT)
Dept: RADIOLOGY | Facility: HOSPITAL | Age: 68
Discharge: HOME | End: 2023-11-27
Payer: MEDICARE

## 2023-11-27 DIAGNOSIS — M21.069 GENU VALGUM, ACQUIRED, UNSPECIFIED LATERALITY: ICD-10-CM

## 2023-11-27 DIAGNOSIS — M84.361A STRESS FRACTURE OF RIGHT TIBIA, INITIAL ENCOUNTER: Primary | ICD-10-CM

## 2023-11-27 DIAGNOSIS — M17.10 PRIMARY LOCALIZED OSTEOARTHRITIS OF KNEE: ICD-10-CM

## 2023-11-27 DIAGNOSIS — M12.552 TRAUMATIC ARTHRITIS OF LEFT HIP: ICD-10-CM

## 2023-11-27 DIAGNOSIS — M17.11 PRIMARY OSTEOARTHRITIS OF RIGHT KNEE: ICD-10-CM

## 2023-11-27 PROCEDURE — 99215 OFFICE O/P EST HI 40 MIN: CPT | Performed by: ORTHOPAEDIC SURGERY

## 2023-11-27 PROCEDURE — 99215 OFFICE O/P EST HI 40 MIN: CPT | Mod: 25 | Performed by: ORTHOPAEDIC SURGERY

## 2023-11-27 PROCEDURE — 77073 BONE LENGTH STUDIES: CPT | Mod: FY

## 2023-11-27 PROCEDURE — 1160F RVW MEDS BY RX/DR IN RCRD: CPT | Performed by: ORTHOPAEDIC SURGERY

## 2023-11-27 PROCEDURE — 1159F MED LIST DOCD IN RCRD: CPT | Performed by: ORTHOPAEDIC SURGERY

## 2023-11-27 PROCEDURE — 1036F TOBACCO NON-USER: CPT | Performed by: ORTHOPAEDIC SURGERY

## 2023-11-27 PROCEDURE — 77073 BONE LENGTH STUDIES: CPT | Performed by: RADIOLOGY

## 2023-11-27 PROCEDURE — 77073 BONE LENGTH STUDIES: CPT

## 2023-11-27 NOTE — PROGRESS NOTES
Chief complaint is right leg pain severe deformity of my right knee.    History… This is a 67-year-old female the past medical medical history of emphysema right tibial shaft stress fracture in April 2023.  10 years prior she had an ORIF of her right distal tibia and she had a left hip fracture even before that.  She presented to Dr. Tunde Spear for possible right total knee replacement but she has severe mechanical axis deviation and a tibial stress fracture which is very painful for her.  More painful for the knee the knee.  She is sent to my office today for possible realignment/fracture treatment.    Patient has not smoked in over 15 years.  She denies a history of diabetes.  She is currently wearing a walking boot which does allow her some pain relief but barely goes above the area of the fracture.    Please note that she also gets significant left hip pain with weightbearing      Her physical examination feels a 67-year-old female who appears older than her stated age.  She is in a wheelchair.he is oriented x 3.  She is here with her son.  She is oriented x 3.  Her head is atraumatic and normocephalic her pupils are equally round.  Her neck is supple her respirations are unlabored and regular with no audible wheezing.  Heart is a regular rate and rhythm.  Her abdomen is soft nondistended.  Her musculoskeletal exam reveals the patient has a severe contracture of the right knee.  She lacks at least 15 to 20 degrees of extension and only able to flex to about 70-80.  She is in severe valgus malalignment of the entire right lower extremity.  She has about a 2-3+ effusion of the right knee.  The right ankle has about 10 degrees of dorsiflexion about 30 degrees of plantarflexion.  She has a mid tibial bump which is very painful it is obvious anteriorly and medially on the tibia.  She has about 20 to 30 degrees of extra external rotation of the right foot compared to the left considering external tibial rotation.   She has a palpable dorsalis pedis and a dopplerable posterior tibial pulse.  She has good capillary refill she has no evidence of neuropathy.    Standing hip to ankle x-ray was done the right lower extremity has an 8 cm lateral axis deviation.  Analyzing the deformity the deviation is about 10 degrees at the distal femur and about 12 degrees at the midportion of the tibia.  The tibia has a stress fracture at the center.  The lateral of the tibia shows about a 12 degree apex anterior deformity.  The fibula is healed.  The knee has severe osteoarthritis tricompartmental with a distal lateral femoral angle of about 74.  The left hip has severe posttraumatic arthritis with a intramedullary nail which is very close to the articular surface of the superior portion of the left femoral neck and head.  Right tibia shows a previously placed plate in position with a healed distal tibia with minimal signs of arthritis of the ankle    Assessment problem 1 left midshaft tibial stress fracture with 12 degree valgus deformity and about 12 degree apex anterior deformity with about 25 degrees of external rotation deformity and a healed fibula patient also has a plate in the distal tibia    Problem 2 severe right knee osteoarthritis with severe valgus alignment of distal femur    Problem #3 severe left hip posttraumatic arthritis with intramedullary nail in place    Plan I had a long discussion with the patient and her son today.  We need to address the tibial stress fracture first even though I cannot completely correct the mechanical axis.  I think the best way to do this would be an intramedullary nail with a open osteotomy of the fibula and tibia for correcting the malunion and malalignment.  We would also need to remove the tibial plate to allow access of the janna to build the distal tibia.  I think we could probably get 10 degrees of valgus correction.    Ultimately the patient is going require a right total knee replacement and  in my opinion I think it would be better to correct the distal femoral valgus alignment with a total stabilized knee replacement rather than trying to do a separate distal femoral osteotomy particularly since she has such a bad flexion contracture.  This will have to follow a healed tibia as I believe the tibial janna will have to have be removed once the knee replacements done.    Lastly I do believe she is going to need a right hip replacement and removal of the intramedullary janna she currently has this would obviously be the third stage of her treatment and her third operation.    This patient has significant disability she is unable to do activities of daily living she has a tibial stress fracture with malalignment which takes priority.  Talked her about the risks and benefits of surgery and she does agree to proceed we had a cancellation on December 20th and approximately 3 weeks.  I need about 2 hours of surgery time she will probably be admitted for 1 night

## 2023-11-29 DIAGNOSIS — S82.209A: Primary | ICD-10-CM

## 2023-11-29 DIAGNOSIS — S82.201A: ICD-10-CM

## 2023-11-30 PROBLEM — S82.201A: Status: ACTIVE | Noted: 2023-11-29

## 2023-11-30 PROBLEM — S82.209A: Status: ACTIVE | Noted: 2023-11-29

## 2023-12-08 ENCOUNTER — TELEMEDICINE CLINICAL SUPPORT (OUTPATIENT)
Dept: PREADMISSION TESTING | Facility: HOSPITAL | Age: 68
End: 2023-12-08
Payer: MEDICARE

## 2023-12-08 DIAGNOSIS — K21.9 GASTROESOPHAGEAL REFLUX DISEASE, UNSPECIFIED WHETHER ESOPHAGITIS PRESENT: ICD-10-CM

## 2023-12-08 RX ORDER — PANTOPRAZOLE SODIUM 40 MG/1
40 TABLET, DELAYED RELEASE ORAL DAILY
Qty: 60 TABLET | Refills: 0 | Status: SHIPPED | OUTPATIENT
Start: 2023-12-08 | End: 2024-02-06

## 2023-12-15 ENCOUNTER — PRE-ADMISSION TESTING (OUTPATIENT)
Dept: PREADMISSION TESTING | Facility: HOSPITAL | Age: 68
End: 2023-12-15
Payer: MEDICARE

## 2023-12-15 VITALS
OXYGEN SATURATION: 98 % | HEART RATE: 69 BPM | HEIGHT: 72 IN | SYSTOLIC BLOOD PRESSURE: 136 MMHG | DIASTOLIC BLOOD PRESSURE: 80 MMHG | BODY MASS INDEX: 25.29 KG/M2 | TEMPERATURE: 97.6 F | WEIGHT: 186.73 LBS

## 2023-12-15 DIAGNOSIS — Z01.818 PREOPERATIVE TESTING: Primary | ICD-10-CM

## 2023-12-15 LAB
ANION GAP SERPL CALC-SCNC: 17 MMOL/L (ref 10–20)
BUN SERPL-MCNC: 6 MG/DL (ref 6–23)
CALCIUM SERPL-MCNC: 9.6 MG/DL (ref 8.6–10.6)
CHLORIDE SERPL-SCNC: 101 MMOL/L (ref 98–107)
CO2 SERPL-SCNC: 21 MMOL/L (ref 21–32)
CREAT SERPL-MCNC: 0.86 MG/DL (ref 0.5–1.05)
ERYTHROCYTE [DISTWIDTH] IN BLOOD BY AUTOMATED COUNT: 14.8 % (ref 11.5–14.5)
GFR SERPL CREATININE-BSD FRML MDRD: 74 ML/MIN/1.73M*2
GLUCOSE SERPL-MCNC: 80 MG/DL (ref 74–99)
HCT VFR BLD AUTO: 33.4 % (ref 36–46)
HGB BLD-MCNC: 11 G/DL (ref 12–16)
MCH RBC QN AUTO: 28.2 PG (ref 26–34)
MCHC RBC AUTO-ENTMCNC: 32.9 G/DL (ref 32–36)
MCV RBC AUTO: 86 FL (ref 80–100)
NRBC BLD-RTO: 0 /100 WBCS (ref 0–0)
PLATELET # BLD AUTO: 344 X10*3/UL (ref 150–450)
POTASSIUM SERPL-SCNC: 4.1 MMOL/L (ref 3.5–5.3)
RBC # BLD AUTO: 3.9 X10*6/UL (ref 4–5.2)
SODIUM SERPL-SCNC: 135 MMOL/L (ref 136–145)
WBC # BLD AUTO: 5.1 X10*3/UL (ref 4.4–11.3)

## 2023-12-15 PROCEDURE — 93010 ELECTROCARDIOGRAM REPORT: CPT | Performed by: INTERNAL MEDICINE

## 2023-12-15 PROCEDURE — 99205 OFFICE O/P NEW HI 60 MIN: CPT | Performed by: NURSE PRACTITIONER

## 2023-12-15 PROCEDURE — 93005 ELECTROCARDIOGRAM TRACING: CPT

## 2023-12-15 PROCEDURE — 36415 COLL VENOUS BLD VENIPUNCTURE: CPT

## 2023-12-15 PROCEDURE — 80048 BASIC METABOLIC PNL TOTAL CA: CPT | Performed by: NURSE PRACTITIONER

## 2023-12-15 PROCEDURE — 85027 COMPLETE CBC AUTOMATED: CPT | Performed by: NURSE PRACTITIONER

## 2023-12-15 PROCEDURE — 87077 CULTURE AEROBIC IDENTIFY: CPT | Performed by: NURSE PRACTITIONER

## 2023-12-15 RX ORDER — CHLORHEXIDINE GLUCONATE ORAL RINSE 1.2 MG/ML
SOLUTION DENTAL
Qty: 15 ML | Refills: 0 | Status: ON HOLD | OUTPATIENT
Start: 2023-12-15 | End: 2023-12-20 | Stop reason: ALTCHOICE

## 2023-12-15 RX ORDER — CHLORHEXIDINE GLUCONATE 40 MG/ML
SOLUTION TOPICAL
Qty: 473 ML | Refills: 0 | Status: ON HOLD | OUTPATIENT
Start: 2023-12-15 | End: 2023-12-20 | Stop reason: ALTCHOICE

## 2023-12-15 ASSESSMENT — CHADS2 SCORE
CHADS2 SCORE: 1
DIABETES: NO
HYPERTENSION: YES
PRIOR STROKE OR TIA OR THROMBOEMBOLISM: NO
CHF: NO
AGE GREATER THAN OR EQUAL TO 75: NO

## 2023-12-15 ASSESSMENT — DUKE ACTIVITY SCORE INDEX (DASI)
CAN YOU WALK INDOORS, SUCH AS AROUND YOUR HOUSE: NO
CAN YOU PARTICIPATE IN STRENOUS SPORTS LIKE SWIMMING, SINGLES TENNIS, FOOTBALL, BASKETBALL, OR SKIING: NO
CAN YOU DO YARD WORK LIKE RAKING LEAVES, WEEDING OR PUSHING A MOWER: NO
CAN YOU HAVE SEXUAL RELATIONS: NO
CAN YOU DO MODERATE WORK AROUND THE HOUSE LIKE VACUUMING, SWEEPING FLOORS OR CARRYING GROCERIES: YES
CAN YOU WALK A BLOCK OR TWO ON LEVEL GROUND: YES
DASI METS SCORE: 4.2
CAN YOU DO HEAVY WORK AROUND THE HOUSE LIKE SCRUBBING FLOORS OR LIFTING AND MOVING HEAVY FURNITURE: NO
CAN YOU PARTICIPATE IN MODERATE RECREATIONAL ACTIVITIES LIKE GOLF, BOWLING, DANCING, DOUBLES TENNIS OR THROWING A BASEBALL OR FOOTBALL: NO
TOTAL_SCORE: 11.7
CAN YOU TAKE CARE OF YOURSELF (EAT, DRESS, BATHE, OR USE TOILET): YES
CAN YOU CLIMB A FLIGHT OF STAIRS OR WALK UP A HILL: NO
CAN YOU DO LIGHT WORK AROUND THE HOUSE LIKE DUSTING OR WASHING DISHES: YES
CAN YOU RUN A SHORT DISTANCE: NO

## 2023-12-15 ASSESSMENT — ENCOUNTER SYMPTOMS
CONSTITUTIONAL NEGATIVE: 1
ARTHRALGIAS: 1
CONSTIPATION: 1
NEUROLOGICAL NEGATIVE: 1
ENDOCRINE NEGATIVE: 1
CARDIOVASCULAR NEGATIVE: 1
BLOOD IN STOOL: 1
DIARRHEA: 1
DYSPNEA AT REST: 1
MYALGIAS: 1
EYES NEGATIVE: 1
NECK NEGATIVE: 1
RESPIRATORY NEGATIVE: 1

## 2023-12-15 ASSESSMENT — LIFESTYLE VARIABLES: SMOKING_STATUS: NONSMOKER

## 2023-12-15 NOTE — PREPROCEDURE INSTRUCTIONS
NPO Instructions:    Do not eat any food after midnight the night before your surgery/procedure.  You may have up to TEN ounces of clear liquids until TWO hours before your instructed arrival time to the hospital. This includes water, black tea/coffee, (no milk or cream), apple juice, and/or electrolyte drinks (Gatorade).  You may chew gum up to TWO hours before your surgery/procedure.    Additional Instructions:     We have sent a prescription for Hibiclens soap and Peridex mouth wash to your preferred pharmacy.  If you have not already, Please  your prescription and start using as directed before surgery.  Follow the instruction sheet provided to you at your CPM/PAT appointment.    Avoid herbal supplements, multivitamins and NSAIDS (non-steroidal anti-inflammatory drugs) such as Advil, Aleve, Ibuprofen, Naproxen, Excedrin, Meloxicam or Celebrex for at least 7 days prior to surgery. May take Tylenol as needed.    Seven/Six Days before Surgery:  Review your medication instructions, stop indicated medications    Day of Surgery:  Review your medication instructions, take indicated medications  Wear comfortable loose fitting clothing  Do not use moisturizers, creams, lotions or perfume  All jewelry and valuables should be left at home    Thanh Colon [facility]  [facility]  Lamcj-[phone]  Atn-[phone]  [email or web address]

## 2023-12-17 LAB — STAPHYLOCOCCUS SPEC CULT: ABNORMAL

## 2023-12-20 ENCOUNTER — ANESTHESIA EVENT (OUTPATIENT)
Dept: OPERATING ROOM | Facility: HOSPITAL | Age: 68
End: 2023-12-20
Payer: MEDICARE

## 2023-12-20 ENCOUNTER — HOSPITAL ENCOUNTER (OUTPATIENT)
Facility: HOSPITAL | Age: 68
Setting detail: OBSERVATION
LOS: 1 days | Discharge: NURSING, REHAB OR LONG-TERM CARE | End: 2023-12-23
Attending: ORTHOPAEDIC SURGERY | Admitting: ORTHOPAEDIC SURGERY
Payer: MEDICARE

## 2023-12-20 ENCOUNTER — ANESTHESIA (OUTPATIENT)
Dept: OPERATING ROOM | Facility: HOSPITAL | Age: 68
End: 2023-12-20
Payer: MEDICARE

## 2023-12-20 ENCOUNTER — APPOINTMENT (OUTPATIENT)
Dept: RADIOLOGY | Facility: HOSPITAL | Age: 68
End: 2023-12-20
Payer: MEDICARE

## 2023-12-20 DIAGNOSIS — S82.209A: ICD-10-CM

## 2023-12-20 DIAGNOSIS — S82.201A: ICD-10-CM

## 2023-12-20 DIAGNOSIS — S82.231P: ICD-10-CM

## 2023-12-20 DIAGNOSIS — M19.171 POST-TRAUMATIC ARTHRITIS OF RIGHT ANKLE: Primary | ICD-10-CM

## 2023-12-20 PROBLEM — I63.9 CVA (CEREBRAL VASCULAR ACCIDENT) (MULTI): Status: ACTIVE | Noted: 2023-12-20

## 2023-12-20 PROBLEM — K21.9 GASTROESOPHAGEAL REFLUX DISEASE: Status: ACTIVE | Noted: 2023-12-20

## 2023-12-20 LAB
ATRIAL RATE: 63 BPM
P AXIS: 63 DEGREES
P OFFSET: 209 MS
P ONSET: 147 MS
PR INTERVAL: 146 MS
Q ONSET: 220 MS
QRS COUNT: 10 BEATS
QRS DURATION: 98 MS
QT INTERVAL: 446 MS
QTC CALCULATION(BAZETT): 456 MS
QTC FREDERICIA: 453 MS
R AXIS: 34 DEGREES
T AXIS: 51 DEGREES
T OFFSET: 443 MS
VENTRICULAR RATE: 63 BPM

## 2023-12-20 PROCEDURE — 2500000005 HC RX 250 GENERAL PHARMACY W/O HCPCS

## 2023-12-20 PROCEDURE — 2500000004 HC RX 250 GENERAL PHARMACY W/ HCPCS (ALT 636 FOR OP/ED): Performed by: PHYSICIAN ASSISTANT

## 2023-12-20 PROCEDURE — 2780000003 HC OR 278 NO HCPCS: Performed by: ORTHOPAEDIC SURGERY

## 2023-12-20 PROCEDURE — 7100000001 HC RECOVERY ROOM TIME - INITIAL BASE CHARGE: Performed by: ORTHOPAEDIC SURGERY

## 2023-12-20 PROCEDURE — 3700000001 HC GENERAL ANESTHESIA TIME - INITIAL BASE CHARGE: Performed by: ORTHOPAEDIC SURGERY

## 2023-12-20 PROCEDURE — A4217 STERILE WATER/SALINE, 500 ML: HCPCS | Performed by: ORTHOPAEDIC SURGERY

## 2023-12-20 PROCEDURE — 96375 TX/PRO/DX INJ NEW DRUG ADDON: CPT

## 2023-12-20 PROCEDURE — 2500000005 HC RX 250 GENERAL PHARMACY W/O HCPCS: Performed by: PHYSICIAN ASSISTANT

## 2023-12-20 PROCEDURE — 96376 TX/PRO/DX INJ SAME DRUG ADON: CPT

## 2023-12-20 PROCEDURE — 2500000004 HC RX 250 GENERAL PHARMACY W/ HCPCS (ALT 636 FOR OP/ED)

## 2023-12-20 PROCEDURE — 7100000002 HC RECOVERY ROOM TIME - EACH INCREMENTAL 1 MINUTE: Performed by: ORTHOPAEDIC SURGERY

## 2023-12-20 PROCEDURE — 99223 1ST HOSP IP/OBS HIGH 75: CPT | Performed by: STUDENT IN AN ORGANIZED HEALTH CARE EDUCATION/TRAINING PROGRAM

## 2023-12-20 PROCEDURE — 2720000007 HC OR 272 NO HCPCS: Performed by: ORTHOPAEDIC SURGERY

## 2023-12-20 PROCEDURE — C1713 ANCHOR/SCREW BN/BN,TIS/BN: HCPCS | Performed by: ORTHOPAEDIC SURGERY

## 2023-12-20 PROCEDURE — 76942 ECHO GUIDE FOR BIOPSY: CPT | Performed by: ANESTHESIOLOGY

## 2023-12-20 PROCEDURE — 64445 NJX AA&/STRD SCIATIC NRV IMG: CPT | Performed by: ANESTHESIOLOGY

## 2023-12-20 PROCEDURE — 27724 REPAIR/GRAFT OF TIBIA: CPT | Performed by: PHYSICIAN ASSISTANT

## 2023-12-20 PROCEDURE — 96361 HYDRATE IV INFUSION ADD-ON: CPT

## 2023-12-20 PROCEDURE — 27724 REPAIR/GRAFT OF TIBIA: CPT | Performed by: ORTHOPAEDIC SURGERY

## 2023-12-20 PROCEDURE — A4217 STERILE WATER/SALINE, 500 ML: HCPCS | Mod: AU | Performed by: PHYSICIAN ASSISTANT

## 2023-12-20 PROCEDURE — 2500000004 HC RX 250 GENERAL PHARMACY W/ HCPCS (ALT 636 FOR OP/ED): Performed by: ORTHOPAEDIC SURGERY

## 2023-12-20 PROCEDURE — G0378 HOSPITAL OBSERVATION PER HR: HCPCS

## 2023-12-20 PROCEDURE — 3600000004 HC OR TIME - INITIAL BASE CHARGE - PROCEDURE LEVEL FOUR: Performed by: ORTHOPAEDIC SURGERY

## 2023-12-20 PROCEDURE — 27707 OSTEOTOMY FIBULA: CPT | Performed by: ORTHOPAEDIC SURGERY

## 2023-12-20 PROCEDURE — 3600000009 HC OR TIME - EACH INCREMENTAL 1 MINUTE - PROCEDURE LEVEL FOUR: Performed by: ORTHOPAEDIC SURGERY

## 2023-12-20 PROCEDURE — 3700000002 HC GENERAL ANESTHESIA TIME - EACH INCREMENTAL 1 MINUTE: Performed by: ORTHOPAEDIC SURGERY

## 2023-12-20 PROCEDURE — A27724 PR RNON/MALUNION TIBIA+AUTOGRAFT: Performed by: ANESTHESIOLOGY

## 2023-12-20 PROCEDURE — 76000 FLUOROSCOPY <1 HR PHYS/QHP: CPT | Mod: RSC

## 2023-12-20 PROCEDURE — 2500000001 HC RX 250 WO HCPCS SELF ADMINISTERED DRUGS (ALT 637 FOR MEDICARE OP): Performed by: PHYSICIAN ASSISTANT

## 2023-12-20 PROCEDURE — 51701 INSERT BLADDER CATHETER: CPT

## 2023-12-20 PROCEDURE — 96365 THER/PROPH/DIAG IV INF INIT: CPT

## 2023-12-20 DEVICE — ADVANCED LOCKING SCREW: Type: IMPLANTABLE DEVICE | Site: TIBIA | Status: FUNCTIONAL

## 2023-12-20 DEVICE — GUIDE WIRE, BALL-TIPPED, STERILE: Type: IMPLANTABLE DEVICE | Site: TIBIA | Status: NON-FUNCTIONAL

## 2023-12-20 DEVICE — TIBIAL NAIL
Type: IMPLANTABLE DEVICE | Site: TIBIA | Status: FUNCTIONAL
Brand: T2 ALPHA

## 2023-12-20 RX ORDER — NORETHINDRONE AND ETHINYL ESTRADIOL 0.5-0.035
KIT ORAL CONTINUOUS PRN
Status: DISCONTINUED | OUTPATIENT
Start: 2023-12-20 | End: 2023-12-20

## 2023-12-20 RX ORDER — TRAMADOL HYDROCHLORIDE 50 MG/1
50 TABLET ORAL EVERY 6 HOURS PRN
Status: DISCONTINUED | OUTPATIENT
Start: 2023-12-20 | End: 2023-12-23 | Stop reason: HOSPADM

## 2023-12-20 RX ORDER — TRANEXAMIC ACID 100 MG/ML
INJECTION, SOLUTION INTRAVENOUS AS NEEDED
Status: DISCONTINUED | OUTPATIENT
Start: 2023-12-20 | End: 2023-12-20

## 2023-12-20 RX ORDER — LABETALOL HYDROCHLORIDE 5 MG/ML
5 INJECTION, SOLUTION INTRAVENOUS ONCE AS NEEDED
Status: DISCONTINUED | OUTPATIENT
Start: 2023-12-20 | End: 2023-12-20 | Stop reason: HOSPADM

## 2023-12-20 RX ORDER — GLYCERIN 1 G/1
1 SUPPOSITORY RECTAL DAILY PRN
Status: DISCONTINUED | OUTPATIENT
Start: 2023-12-20 | End: 2023-12-23 | Stop reason: HOSPADM

## 2023-12-20 RX ORDER — LIDOCAINE HYDROCHLORIDE 10 MG/ML
0.1 INJECTION INFILTRATION; PERINEURAL ONCE
Status: DISCONTINUED | OUTPATIENT
Start: 2023-12-20 | End: 2023-12-20 | Stop reason: HOSPADM

## 2023-12-20 RX ORDER — FENTANYL CITRATE 50 UG/ML
INJECTION, SOLUTION INTRAMUSCULAR; INTRAVENOUS AS NEEDED
Status: DISCONTINUED | OUTPATIENT
Start: 2023-12-20 | End: 2023-12-20

## 2023-12-20 RX ORDER — DEXAMETHASONE SODIUM PHOSPHATE 4 MG/ML
INJECTION, SOLUTION INTRA-ARTICULAR; INTRALESIONAL; INTRAMUSCULAR; INTRAVENOUS; SOFT TISSUE AS NEEDED
Status: DISCONTINUED | OUTPATIENT
Start: 2023-12-20 | End: 2023-12-20

## 2023-12-20 RX ORDER — SENNOSIDES 8.6 MG/1
2 TABLET ORAL 2 TIMES DAILY
Status: DISCONTINUED | OUTPATIENT
Start: 2023-12-20 | End: 2023-12-23 | Stop reason: HOSPADM

## 2023-12-20 RX ORDER — OXYCODONE HYDROCHLORIDE 5 MG/1
5 TABLET ORAL EVERY 6 HOURS PRN
Status: DISCONTINUED | OUTPATIENT
Start: 2023-12-20 | End: 2023-12-23 | Stop reason: HOSPADM

## 2023-12-20 RX ORDER — NALOXONE HYDROCHLORIDE 0.4 MG/ML
0.2 INJECTION, SOLUTION INTRAMUSCULAR; INTRAVENOUS; SUBCUTANEOUS EVERY 5 MIN PRN
Status: DISCONTINUED | OUTPATIENT
Start: 2023-12-20 | End: 2023-12-23 | Stop reason: HOSPADM

## 2023-12-20 RX ORDER — ALBUTEROL SULFATE 0.83 MG/ML
2.5 SOLUTION RESPIRATORY (INHALATION) ONCE AS NEEDED
Status: DISCONTINUED | OUTPATIENT
Start: 2023-12-20 | End: 2023-12-20 | Stop reason: HOSPADM

## 2023-12-20 RX ORDER — OXYCODONE HYDROCHLORIDE 5 MG/1
5 TABLET ORAL EVERY 4 HOURS PRN
Status: DISCONTINUED | OUTPATIENT
Start: 2023-12-20 | End: 2023-12-20 | Stop reason: HOSPADM

## 2023-12-20 RX ORDER — SODIUM CHLORIDE, SODIUM LACTATE, POTASSIUM CHLORIDE, CALCIUM CHLORIDE 600; 310; 30; 20 MG/100ML; MG/100ML; MG/100ML; MG/100ML
100 INJECTION, SOLUTION INTRAVENOUS CONTINUOUS
Status: DISCONTINUED | OUTPATIENT
Start: 2023-12-20 | End: 2023-12-20 | Stop reason: HOSPADM

## 2023-12-20 RX ORDER — PANTOPRAZOLE SODIUM 40 MG/1
40 TABLET, DELAYED RELEASE ORAL DAILY
Status: DISCONTINUED | OUTPATIENT
Start: 2023-12-20 | End: 2023-12-23 | Stop reason: HOSPADM

## 2023-12-20 RX ORDER — HYDRALAZINE HYDROCHLORIDE 20 MG/ML
5 INJECTION INTRAMUSCULAR; INTRAVENOUS EVERY 30 MIN PRN
Status: DISCONTINUED | OUTPATIENT
Start: 2023-12-20 | End: 2023-12-20 | Stop reason: HOSPADM

## 2023-12-20 RX ORDER — ONDANSETRON 4 MG/1
4 TABLET, ORALLY DISINTEGRATING ORAL EVERY 8 HOURS PRN
Status: DISCONTINUED | OUTPATIENT
Start: 2023-12-20 | End: 2023-12-23 | Stop reason: HOSPADM

## 2023-12-20 RX ORDER — FENOFIBRATE 160 MG/1
160 TABLET ORAL DAILY
Status: DISCONTINUED | OUTPATIENT
Start: 2023-12-20 | End: 2023-12-23 | Stop reason: HOSPADM

## 2023-12-20 RX ORDER — MIRTAZAPINE 15 MG/1
7.5 TABLET, FILM COATED ORAL NIGHTLY
Status: DISCONTINUED | OUTPATIENT
Start: 2023-12-20 | End: 2023-12-23 | Stop reason: HOSPADM

## 2023-12-20 RX ORDER — VANCOMYCIN HYDROCHLORIDE 1 G/200ML
INJECTION, SOLUTION INTRAVENOUS AS NEEDED
Status: DISCONTINUED | OUTPATIENT
Start: 2023-12-20 | End: 2023-12-20

## 2023-12-20 RX ORDER — ONDANSETRON HYDROCHLORIDE 2 MG/ML
INJECTION, SOLUTION INTRAVENOUS AS NEEDED
Status: DISCONTINUED | OUTPATIENT
Start: 2023-12-20 | End: 2023-12-20

## 2023-12-20 RX ORDER — PROMETHAZINE HYDROCHLORIDE 25 MG/1
25 SUPPOSITORY RECTAL EVERY 12 HOURS PRN
Status: DISCONTINUED | OUTPATIENT
Start: 2023-12-20 | End: 2023-12-23 | Stop reason: HOSPADM

## 2023-12-20 RX ORDER — BISACODYL 5 MG
10 TABLET, DELAYED RELEASE (ENTERIC COATED) ORAL DAILY PRN
Status: DISCONTINUED | OUTPATIENT
Start: 2023-12-20 | End: 2023-12-23 | Stop reason: HOSPADM

## 2023-12-20 RX ORDER — DIPHENHYDRAMINE HCL 12.5MG/5ML
12.5 LIQUID (ML) ORAL EVERY 6 HOURS PRN
Status: DISCONTINUED | OUTPATIENT
Start: 2023-12-20 | End: 2023-12-23 | Stop reason: HOSPADM

## 2023-12-20 RX ORDER — CYCLOBENZAPRINE HCL 10 MG
10 TABLET ORAL 3 TIMES DAILY PRN
Status: DISCONTINUED | OUTPATIENT
Start: 2023-12-20 | End: 2023-12-23 | Stop reason: HOSPADM

## 2023-12-20 RX ORDER — LIDOCAINE HYDROCHLORIDE 20 MG/ML
INJECTION, SOLUTION INFILTRATION; PERINEURAL AS NEEDED
Status: DISCONTINUED | OUTPATIENT
Start: 2023-12-20 | End: 2023-12-20

## 2023-12-20 RX ORDER — HYDROMORPHONE HYDROCHLORIDE 1 MG/ML
INJECTION, SOLUTION INTRAMUSCULAR; INTRAVENOUS; SUBCUTANEOUS AS NEEDED
Status: DISCONTINUED | OUTPATIENT
Start: 2023-12-20 | End: 2023-12-20

## 2023-12-20 RX ORDER — ROCURONIUM BROMIDE 10 MG/ML
INJECTION, SOLUTION INTRAVENOUS AS NEEDED
Status: DISCONTINUED | OUTPATIENT
Start: 2023-12-20 | End: 2023-12-20

## 2023-12-20 RX ORDER — POLYETHYLENE GLYCOL 3350 17 G/17G
17 POWDER, FOR SOLUTION ORAL DAILY
Status: DISCONTINUED | OUTPATIENT
Start: 2023-12-20 | End: 2023-12-23 | Stop reason: HOSPADM

## 2023-12-20 RX ORDER — AMLODIPINE BESYLATE 10 MG/1
10 TABLET ORAL DAILY
Status: DISCONTINUED | OUTPATIENT
Start: 2023-12-21 | End: 2023-12-23 | Stop reason: HOSPADM

## 2023-12-20 RX ORDER — ONDANSETRON HYDROCHLORIDE 2 MG/ML
4 INJECTION, SOLUTION INTRAVENOUS ONCE AS NEEDED
Status: COMPLETED | OUTPATIENT
Start: 2023-12-20 | End: 2023-12-20

## 2023-12-20 RX ORDER — OXYCODONE HYDROCHLORIDE 5 MG/1
10 TABLET ORAL EVERY 4 HOURS PRN
Status: DISCONTINUED | OUTPATIENT
Start: 2023-12-20 | End: 2023-12-23 | Stop reason: HOSPADM

## 2023-12-20 RX ORDER — SODIUM CHLORIDE, SODIUM LACTATE, POTASSIUM CHLORIDE, CALCIUM CHLORIDE 600; 310; 30; 20 MG/100ML; MG/100ML; MG/100ML; MG/100ML
100 INJECTION, SOLUTION INTRAVENOUS CONTINUOUS
Status: ACTIVE | OUTPATIENT
Start: 2023-12-20 | End: 2023-12-21

## 2023-12-20 RX ORDER — FLUOXETINE HYDROCHLORIDE 20 MG/1
60 CAPSULE ORAL DAILY
Status: DISCONTINUED | OUTPATIENT
Start: 2023-12-20 | End: 2023-12-23 | Stop reason: HOSPADM

## 2023-12-20 RX ORDER — PHENYLEPHRINE HCL IN 0.9% NACL 0.4MG/10ML
SYRINGE (ML) INTRAVENOUS AS NEEDED
Status: DISCONTINUED | OUTPATIENT
Start: 2023-12-20 | End: 2023-12-20

## 2023-12-20 RX ORDER — OXYCODONE HYDROCHLORIDE 5 MG/1
10 TABLET ORAL EVERY 4 HOURS PRN
Status: DISCONTINUED | OUTPATIENT
Start: 2023-12-20 | End: 2023-12-20 | Stop reason: HOSPADM

## 2023-12-20 RX ORDER — HYDROMORPHONE HYDROCHLORIDE 1 MG/ML
0.5 INJECTION, SOLUTION INTRAMUSCULAR; INTRAVENOUS; SUBCUTANEOUS EVERY 5 MIN PRN
Status: DISCONTINUED | OUTPATIENT
Start: 2023-12-20 | End: 2023-12-20 | Stop reason: HOSPADM

## 2023-12-20 RX ORDER — ASPIRIN 81 MG/1
81 TABLET ORAL 2 TIMES DAILY
Status: DISCONTINUED | OUTPATIENT
Start: 2023-12-20 | End: 2023-12-23 | Stop reason: HOSPADM

## 2023-12-20 RX ORDER — PROMETHAZINE HYDROCHLORIDE 25 MG/1
25 TABLET ORAL EVERY 6 HOURS PRN
Status: DISCONTINUED | OUTPATIENT
Start: 2023-12-20 | End: 2023-12-23 | Stop reason: HOSPADM

## 2023-12-20 RX ORDER — TOPIRAMATE SPINKLE 25 MG/1
50 CAPSULE ORAL 2 TIMES DAILY
Status: DISCONTINUED | OUTPATIENT
Start: 2023-12-20 | End: 2023-12-23 | Stop reason: HOSPADM

## 2023-12-20 RX ORDER — MIDAZOLAM HYDROCHLORIDE 1 MG/ML
INJECTION, SOLUTION INTRAMUSCULAR; INTRAVENOUS AS NEEDED
Status: DISCONTINUED | OUTPATIENT
Start: 2023-12-20 | End: 2023-12-20

## 2023-12-20 RX ORDER — LISINOPRIL 40 MG/1
40 TABLET ORAL DAILY
Status: DISCONTINUED | OUTPATIENT
Start: 2023-12-20 | End: 2023-12-23 | Stop reason: HOSPADM

## 2023-12-20 RX ORDER — HYDROMORPHONE HYDROCHLORIDE 1 MG/ML
0.5 INJECTION, SOLUTION INTRAMUSCULAR; INTRAVENOUS; SUBCUTANEOUS EVERY 2 HOUR PRN
Status: DISCONTINUED | OUTPATIENT
Start: 2023-12-20 | End: 2023-12-23 | Stop reason: HOSPADM

## 2023-12-20 RX ORDER — LAMOTRIGINE 150 MG/1
150 TABLET ORAL DAILY
Status: DISCONTINUED | OUTPATIENT
Start: 2023-12-20 | End: 2023-12-23 | Stop reason: HOSPADM

## 2023-12-20 RX ORDER — ACETAMINOPHEN 325 MG/1
650 TABLET ORAL EVERY 4 HOURS PRN
Status: DISCONTINUED | OUTPATIENT
Start: 2023-12-20 | End: 2023-12-20 | Stop reason: HOSPADM

## 2023-12-20 RX ORDER — ONDANSETRON HYDROCHLORIDE 2 MG/ML
4 INJECTION, SOLUTION INTRAVENOUS EVERY 8 HOURS PRN
Status: DISCONTINUED | OUTPATIENT
Start: 2023-12-20 | End: 2023-12-23 | Stop reason: HOSPADM

## 2023-12-20 RX ORDER — PROPOFOL 10 MG/ML
INJECTION, EMULSION INTRAVENOUS AS NEEDED
Status: DISCONTINUED | OUTPATIENT
Start: 2023-12-20 | End: 2023-12-20

## 2023-12-20 RX ORDER — METHOCARBAMOL 100 MG/ML
1000 INJECTION, SOLUTION INTRAMUSCULAR; INTRAVENOUS ONCE
Status: COMPLETED | OUTPATIENT
Start: 2023-12-20 | End: 2023-12-20

## 2023-12-20 RX ORDER — AMLODIPINE BESYLATE 10 MG/1
10 TABLET ORAL
Status: DISCONTINUED | OUTPATIENT
Start: 2023-12-20 | End: 2023-12-20

## 2023-12-20 RX ORDER — HYDROMORPHONE HYDROCHLORIDE 1 MG/ML
0.2 INJECTION, SOLUTION INTRAMUSCULAR; INTRAVENOUS; SUBCUTANEOUS EVERY 5 MIN PRN
Status: DISCONTINUED | OUTPATIENT
Start: 2023-12-20 | End: 2023-12-20 | Stop reason: HOSPADM

## 2023-12-20 RX ORDER — ACETAMINOPHEN 325 MG/1
650 TABLET ORAL EVERY 6 HOURS SCHEDULED
Status: DISCONTINUED | OUTPATIENT
Start: 2023-12-20 | End: 2023-12-23 | Stop reason: HOSPADM

## 2023-12-20 RX ORDER — SODIUM CHLORIDE, SODIUM LACTATE, POTASSIUM CHLORIDE, CALCIUM CHLORIDE 600; 310; 30; 20 MG/100ML; MG/100ML; MG/100ML; MG/100ML
INJECTION, SOLUTION INTRAVENOUS CONTINUOUS PRN
Status: DISCONTINUED | OUTPATIENT
Start: 2023-12-20 | End: 2023-12-20

## 2023-12-20 RX ORDER — GABAPENTIN 100 MG/1
100 CAPSULE ORAL NIGHTLY
Status: DISCONTINUED | OUTPATIENT
Start: 2023-12-20 | End: 2023-12-23 | Stop reason: HOSPADM

## 2023-12-20 RX ORDER — FERROUS SULFATE, DRIED 160(50) MG
1 TABLET, EXTENDED RELEASE ORAL 2 TIMES DAILY
Status: DISCONTINUED | OUTPATIENT
Start: 2023-12-20 | End: 2023-12-23 | Stop reason: HOSPADM

## 2023-12-20 RX ORDER — SODIUM CHLORIDE 0.9 G/100ML
IRRIGANT IRRIGATION AS NEEDED
Status: DISCONTINUED | OUTPATIENT
Start: 2023-12-20 | End: 2023-12-20 | Stop reason: HOSPADM

## 2023-12-20 RX ADMIN — HYDROMORPHONE HYDROCHLORIDE 0.2 MG: 1 INJECTION, SOLUTION INTRAMUSCULAR; INTRAVENOUS; SUBCUTANEOUS at 12:03

## 2023-12-20 RX ADMIN — HYDROMORPHONE HYDROCHLORIDE 0.2 MG: 1 INJECTION, SOLUTION INTRAMUSCULAR; INTRAVENOUS; SUBCUTANEOUS at 11:03

## 2023-12-20 RX ADMIN — METHOCARBAMOL 1000 MG: 1000 INJECTION, SOLUTION INTRAMUSCULAR; INTRAVENOUS at 11:50

## 2023-12-20 RX ADMIN — SUGAMMADEX 200 MG: 100 INJECTION, SOLUTION INTRAVENOUS at 11:28

## 2023-12-20 RX ADMIN — LISINOPRIL 40 MG: 40 TABLET ORAL at 15:03

## 2023-12-20 RX ADMIN — ROCURONIUM BROMIDE 10 MG: 10 INJECTION, SOLUTION INTRAVENOUS at 10:31

## 2023-12-20 RX ADMIN — Medication 1 TABLET: at 20:13

## 2023-12-20 RX ADMIN — ASPIRIN 81 MG: 81 TABLET, COATED ORAL at 20:13

## 2023-12-20 RX ADMIN — Medication 120 MCG: at 09:35

## 2023-12-20 RX ADMIN — MIDAZOLAM 2 MG: 1 INJECTION INTRAMUSCULAR; INTRAVENOUS at 08:30

## 2023-12-20 RX ADMIN — HYDROMORPHONE HYDROCHLORIDE 0.2 MG: 1 INJECTION, SOLUTION INTRAMUSCULAR; INTRAVENOUS; SUBCUTANEOUS at 12:07

## 2023-12-20 RX ADMIN — Medication 80 MCG: at 09:29

## 2023-12-20 RX ADMIN — PROPOFOL 100 MG: 10 INJECTION, EMULSION INTRAVENOUS at 08:48

## 2023-12-20 RX ADMIN — VANCOMYCIN HYDROCHLORIDE 1.5 G: 5 INJECTION, POWDER, LYOPHILIZED, FOR SOLUTION INTRAVENOUS at 21:15

## 2023-12-20 RX ADMIN — SODIUM CHLORIDE, POTASSIUM CHLORIDE, SODIUM LACTATE AND CALCIUM CHLORIDE 100 ML/HR: 600; 310; 30; 20 INJECTION, SOLUTION INTRAVENOUS at 11:51

## 2023-12-20 RX ADMIN — HYDROMORPHONE HYDROCHLORIDE 0.2 MG: 1 INJECTION, SOLUTION INTRAMUSCULAR; INTRAVENOUS; SUBCUTANEOUS at 12:38

## 2023-12-20 RX ADMIN — TRANEXAMIC ACID 1000 MG: 100 INJECTION, SOLUTION INTRAVENOUS at 08:55

## 2023-12-20 RX ADMIN — ACETAMINOPHEN 650 MG: 325 TABLET ORAL at 14:53

## 2023-12-20 RX ADMIN — ONDANSETRON 4 MG: 2 INJECTION, SOLUTION INTRAMUSCULAR; INTRAVENOUS at 11:03

## 2023-12-20 RX ADMIN — OXYCODONE HYDROCHLORIDE 10 MG: 5 TABLET ORAL at 20:12

## 2023-12-20 RX ADMIN — FENTANYL CITRATE 25 MCG: 50 INJECTION, SOLUTION INTRAMUSCULAR; INTRAVENOUS at 09:15

## 2023-12-20 RX ADMIN — ROCURONIUM BROMIDE 50 MG: 10 INJECTION, SOLUTION INTRAVENOUS at 08:49

## 2023-12-20 RX ADMIN — FLUOXETINE 60 MG: 20 CAPSULE ORAL at 14:53

## 2023-12-20 RX ADMIN — Medication 2 L/MIN: at 13:45

## 2023-12-20 RX ADMIN — DEXAMETHASONE SODIUM PHOSPHATE 4 MG: 4 INJECTION, SOLUTION INTRAMUSCULAR; INTRAVENOUS at 09:10

## 2023-12-20 RX ADMIN — TOPIRAMATE 50 MG: 25 CAPSULE, COATED PELLETS ORAL at 17:51

## 2023-12-20 RX ADMIN — LIDOCAINE HYDROCHLORIDE 100 MG: 20 INJECTION, SOLUTION INFILTRATION; PERINEURAL at 08:48

## 2023-12-20 RX ADMIN — ONDANSETRON 4 MG: 2 INJECTION INTRAMUSCULAR; INTRAVENOUS at 11:44

## 2023-12-20 RX ADMIN — Medication 120 MCG: at 09:32

## 2023-12-20 RX ADMIN — SENNOSIDES 17.2 MG: 8.6 TABLET, FILM COATED ORAL at 20:12

## 2023-12-20 RX ADMIN — FENTANYL CITRATE 50 MCG: 50 INJECTION, SOLUTION INTRAMUSCULAR; INTRAVENOUS at 08:48

## 2023-12-20 RX ADMIN — ROCURONIUM BROMIDE 20 MG: 10 INJECTION, SOLUTION INTRAVENOUS at 09:52

## 2023-12-20 RX ADMIN — FENTANYL CITRATE 25 MCG: 50 INJECTION, SOLUTION INTRAMUSCULAR; INTRAVENOUS at 11:02

## 2023-12-20 RX ADMIN — PANTOPRAZOLE SODIUM 40 MG: 40 TABLET, DELAYED RELEASE ORAL at 17:51

## 2023-12-20 RX ADMIN — SODIUM CHLORIDE, POTASSIUM CHLORIDE, SODIUM LACTATE AND CALCIUM CHLORIDE: 600; 310; 30; 20 INJECTION, SOLUTION INTRAVENOUS at 08:35

## 2023-12-20 RX ADMIN — ACETAMINOPHEN 650 MG: 325 TABLET ORAL at 20:11

## 2023-12-20 RX ADMIN — FENOFIBRATE 160 MG: 160 TABLET ORAL at 17:51

## 2023-12-20 RX ADMIN — VANCOMYCIN HYDROCHLORIDE 1 G: 1 INJECTION, SOLUTION INTRAVENOUS at 07:45

## 2023-12-20 RX ADMIN — TOPIRAMATE 50 MG: 25 CAPSULE, COATED PELLETS ORAL at 20:11

## 2023-12-20 RX ADMIN — ACETAMINOPHEN 650 MG: 325 TABLET ORAL at 23:16

## 2023-12-20 RX ADMIN — LAMOTRIGINE 150 MG: 150 TABLET ORAL at 17:52

## 2023-12-20 RX ADMIN — OXYCODONE HYDROCHLORIDE 10 MG: 5 TABLET ORAL at 14:53

## 2023-12-20 RX ADMIN — HYDROMORPHONE HYDROCHLORIDE 0.2 MG: 1 INJECTION, SOLUTION INTRAMUSCULAR; INTRAVENOUS; SUBCUTANEOUS at 12:32

## 2023-12-20 RX ADMIN — ASPIRIN 81 MG: 81 TABLET, COATED ORAL at 14:53

## 2023-12-20 RX ADMIN — Medication 1 TABLET: at 14:53

## 2023-12-20 RX ADMIN — Medication 2 L/MIN: at 12:00

## 2023-12-20 RX ADMIN — VANCOMYCIN HYDROCHLORIDE 0.5 G: 1 INJECTION, SOLUTION INTRAVENOUS at 09:00

## 2023-12-20 RX ADMIN — SODIUM CHLORIDE, POTASSIUM CHLORIDE, SODIUM LACTATE AND CALCIUM CHLORIDE 100 ML/HR: 600; 310; 30; 20 INJECTION, SOLUTION INTRAVENOUS at 14:54

## 2023-12-20 SDOH — SOCIAL STABILITY: SOCIAL INSECURITY: HAS ANYONE EVER THREATENED TO HURT YOUR FAMILY OR YOUR PETS?: NO

## 2023-12-20 SDOH — SOCIAL STABILITY: SOCIAL INSECURITY: ABUSE: ADULT

## 2023-12-20 SDOH — HEALTH STABILITY: MENTAL HEALTH: CURRENT SMOKER: 0

## 2023-12-20 SDOH — SOCIAL STABILITY: SOCIAL INSECURITY: ARE YOU OR HAVE YOU BEEN THREATENED OR ABUSED PHYSICALLY, EMOTIONALLY, OR SEXUALLY BY ANYONE?: NO

## 2023-12-20 SDOH — SOCIAL STABILITY: SOCIAL INSECURITY: DOES ANYONE TRY TO KEEP YOU FROM HAVING/CONTACTING OTHER FRIENDS OR DOING THINGS OUTSIDE YOUR HOME?: NO

## 2023-12-20 SDOH — SOCIAL STABILITY: SOCIAL INSECURITY: WERE YOU ABLE TO COMPLETE ALL THE BEHAVIORAL HEALTH SCREENINGS?: YES

## 2023-12-20 SDOH — SOCIAL STABILITY: SOCIAL INSECURITY: DO YOU FEEL ANYONE HAS EXPLOITED OR TAKEN ADVANTAGE OF YOU FINANCIALLY OR OF YOUR PERSONAL PROPERTY?: NO

## 2023-12-20 SDOH — SOCIAL STABILITY: SOCIAL INSECURITY: DO YOU FEEL UNSAFE GOING BACK TO THE PLACE WHERE YOU ARE LIVING?: NO

## 2023-12-20 SDOH — SOCIAL STABILITY: SOCIAL INSECURITY: ARE THERE ANY APPARENT SIGNS OF INJURIES/BEHAVIORS THAT COULD BE RELATED TO ABUSE/NEGLECT?: NO

## 2023-12-20 ASSESSMENT — COGNITIVE AND FUNCTIONAL STATUS - GENERAL
CLIMB 3 TO 5 STEPS WITH RAILING: TOTAL
MOVING TO AND FROM BED TO CHAIR: A LOT
EATING MEALS: A LITTLE
DRESSING REGULAR LOWER BODY CLOTHING: A LOT
DRESSING REGULAR UPPER BODY CLOTHING: A LITTLE
MOVING FROM LYING ON BACK TO SITTING ON SIDE OF FLAT BED WITH BEDRAILS: A LITTLE
HELP NEEDED FOR BATHING: A LOT
MOBILITY SCORE: 13
DAILY ACTIVITIY SCORE: 15
WALKING IN HOSPITAL ROOM: A LOT
TURNING FROM BACK TO SIDE WHILE IN FLAT BAD: A LITTLE
TOILETING: A LOT
PERSONAL GROOMING: A LITTLE
PATIENT BASELINE BEDBOUND: NO
STANDING UP FROM CHAIR USING ARMS: A LOT

## 2023-12-20 ASSESSMENT — PATIENT HEALTH QUESTIONNAIRE - PHQ9
SUM OF ALL RESPONSES TO PHQ9 QUESTIONS 1 & 2: 0
2. FEELING DOWN, DEPRESSED OR HOPELESS: NOT AT ALL
1. LITTLE INTEREST OR PLEASURE IN DOING THINGS: NOT AT ALL

## 2023-12-20 ASSESSMENT — LIFESTYLE VARIABLES
SKIP TO QUESTIONS 9-10: 1
AUDIT-C TOTAL SCORE: 0
SUBSTANCE_ABUSE_PAST_12_MONTHS: NO
HOW OFTEN DO YOU HAVE A DRINK CONTAINING ALCOHOL: NEVER
HOW MANY STANDARD DRINKS CONTAINING ALCOHOL DO YOU HAVE ON A TYPICAL DAY: PATIENT DOES NOT DRINK
HOW OFTEN DO YOU HAVE 6 OR MORE DRINKS ON ONE OCCASION: NEVER
AUDIT-C TOTAL SCORE: 0
PRESCIPTION_ABUSE_PAST_12_MONTHS: NO

## 2023-12-20 ASSESSMENT — PAIN - FUNCTIONAL ASSESSMENT
PAIN_FUNCTIONAL_ASSESSMENT: UNABLE TO SELF-REPORT
PAIN_FUNCTIONAL_ASSESSMENT: 0-10
PAIN_FUNCTIONAL_ASSESSMENT: UNABLE TO SELF-REPORT
PAIN_FUNCTIONAL_ASSESSMENT: 0-10
PAIN_FUNCTIONAL_ASSESSMENT: UNABLE TO SELF-REPORT
PAIN_FUNCTIONAL_ASSESSMENT: 0-10
PAIN_FUNCTIONAL_ASSESSMENT: UNABLE TO SELF-REPORT
PAIN_FUNCTIONAL_ASSESSMENT: 0-10

## 2023-12-20 ASSESSMENT — PAIN SCALES - GENERAL
PAINLEVEL_OUTOF10: 8
PAINLEVEL_OUTOF10: 9
PAIN_LEVEL: 2
PAINLEVEL_OUTOF10: 5 - MODERATE PAIN
PAINLEVEL_OUTOF10: 8
PAINLEVEL_OUTOF10: 10 - WORST POSSIBLE PAIN
PAINLEVEL_OUTOF10: 6
PAINLEVEL_OUTOF10: 6

## 2023-12-20 ASSESSMENT — ACTIVITIES OF DAILY LIVING (ADL)
GROOMING: NEEDS ASSISTANCE
HEARING - RIGHT EAR: FUNCTIONAL
BATHING: NEEDS ASSISTANCE
LACK_OF_TRANSPORTATION: NO
HEARING - LEFT EAR: FUNCTIONAL
FEEDING YOURSELF: INDEPENDENT
ASSISTIVE_DEVICE: WHEELCHAIR;WALKER
WALKS IN HOME: DEPENDENT
ADEQUATE_TO_COMPLETE_ADL: YES
DRESSING YOURSELF: NEEDS ASSISTANCE
TOILETING: NEEDS ASSISTANCE
JUDGMENT_ADEQUATE_SAFELY_COMPLETE_DAILY_ACTIVITIES: YES
PATIENT'S MEMORY ADEQUATE TO SAFELY COMPLETE DAILY ACTIVITIES?: YES

## 2023-12-20 ASSESSMENT — COLUMBIA-SUICIDE SEVERITY RATING SCALE - C-SSRS
6. HAVE YOU EVER DONE ANYTHING, STARTED TO DO ANYTHING, OR PREPARED TO DO ANYTHING TO END YOUR LIFE?: NO
1. IN THE PAST MONTH, HAVE YOU WISHED YOU WERE DEAD OR WISHED YOU COULD GO TO SLEEP AND NOT WAKE UP?: NO
2. HAVE YOU ACTUALLY HAD ANY THOUGHTS OF KILLING YOURSELF?: NO

## 2023-12-20 NOTE — ANESTHESIA POSTPROCEDURE EVALUATION
Patient: Mary Marsh    Procedure Summary       Date: 12/20/23 Room / Location: [facility] OR 09 / [facility]    Anesthesia Start: 0835 Anesthesia Stop: 1139    Procedures:       Removal of hardware right ankle (Right: Ankle)      Osteotomy of right tibia and fibula (Right: Leg Lower)      Intramedullary nailing right tibia (Right: Leg Lower) Diagnosis:       Traumatic closed displaced fracture of shaft of tibia, unspecified laterality, initial encounter      Closed traumatic displaced fracture of shaft of right tibia, initial encounter      (Traumatic closed displaced fracture of shaft of tibia, unspecified laterality, initial encounter [S82.209A])      (Closed traumatic displaced fracture of shaft of right tibia, initial encounter [S82.201A])    Surgeons: Rahul Linda MD Responsible Provider: Rocky Gerardo DO    Anesthesia Type: general ASA Status: 3            Anesthesia Type: general    Vitals Value Taken Time   /51 12/20/23 1315   Temp 36.1 °C (97 °F) 12/20/23 1245   Pulse 77 12/20/23 1318   Resp 14 12/20/23 1317   SpO2 96 % 12/20/23 1318   Vitals shown include unvalidated device data.    Anesthesia Post Evaluation    Patient location during evaluation: PACU  Patient participation: complete - patient cannot participate  Level of consciousness: sleepy but conscious  Pain score: 2  Pain management: adequate  Multimodal analgesia pain management approach  Airway patency: patent  Two or more strategies used to mitigate risk of obstructive sleep apnea  Cardiovascular status: acceptable  Respiratory status: acceptable  Hydration status: acceptable  Postoperative Nausea and Vomiting: mild        No notable events documented.

## 2023-12-20 NOTE — ANESTHESIA PROCEDURE NOTES
Peripheral IV  Date/Time: 12/20/2023 9:00 AM      Placement  Needle size: 18 G  Laterality: right  Location: wrist  Local anesthetic: none  Site prep: alcohol  Technique: anatomical landmarks  Attempts: 1

## 2023-12-20 NOTE — ANESTHESIA PREPROCEDURE EVALUATION
Patient: Mary Marsh    Procedure Information       Date/Time: 12/20/23 0815    Procedures:       Removal of hardware right ankle (Right: Ankle) - Removal of hardware right ankle  intramedullary nailing right tibia,      Osteotomy of right tibia and fibula (Right: Leg Lower)      Intramedullary nailing right tibia (Right: Leg Lower)    Location: [facility] OR 09 / [facility] OR    Surgeons: Rahul Linda MD            [unfilled]    Clinical information reviewed:   Tobacco  Allergies  Meds   Med Hx  Surg Hx  OB Status  Fam Hx  Soc   Hx        NPO Detail:  NPO/Void Status  Carbonhydrate Drink Given Prior to Surgery? : N  Date of Last Liquid: 12/19/23  Time of Last Liquid: 0000  Date of Last Solid: 12/19/23  Time of Last Solid: 0000  Last Intake Type: Clear fluids  Time of Last Void: 0625         Physical Exam    Airway  Mallampati: II  TM distance: <3 FB  Neck ROM: full     Cardiovascular - normal exam  Rhythm: regular  Rate: normal     Dental     Comments: Partial dentures    Pulmonary - normal exam  Breath sounds clear to auscultation     Abdominal            Anesthesia Plan    ASA 3     general     The patient is not a current smoker.    intravenous induction   Postoperative administration of opioids is intended.  Trial extubation is planned.  Anesthetic plan and risks discussed with patient (son at bed side).  Use of blood products discussed with patient who.    Plan discussed with attending.

## 2023-12-20 NOTE — ANESTHESIA PROCEDURE NOTES
Airway  Date/Time: 12/20/2023 8:51 AM  Urgency: elective    Airway not difficult    Staffing  Performed: resident   Authorized by: Rocky Gerardo DO    Performed by: Julio Nava DO  Patient location during procedure: OR    Indications and Patient Condition  Indications for airway management: anesthesia  Spontaneous Ventilation: absent  Sedation level: deep  Preoxygenated: yes  Patient position: sniffing  Mask difficulty assessment: 1 - vent by mask  Planned trial extubation    Final Airway Details  Final airway type: endotracheal airway      Successful airway: ETT  Cuffed: yes   Successful intubation technique: direct laryngoscopy  Facilitating devices/methods: intubating stylet and cricoid pressure  Endotracheal tube insertion site: oral  Blade: Lauro  Blade size: #3  ETT size (mm): 7.0  Cormack-Lehane Classification: grade IIa - partial view of glottis  Placement verified by: chest auscultation and capnometry   Measured from: lips  ETT to lips (cm): 22  Number of attempts at approach: 1

## 2023-12-20 NOTE — HOSPITAL COURSE
68 year-old woman who presented with malunion of the right midshaft tibia and fibula, genu valgum right knee with osteoarthritis and leg length discrepancy. Patient is now s/p intramedullary nail right tibia with bone grafting and tibia and fibula osteotomies on 12/20/2023 by Dr. Linda. On the day of surgery, patient was identified in the pre-operative holding area and agreeable to proceed with surgery. Written consent was obtained.  Please see operative note for further details of this procedure. Patient received 24 hours of francesca-operative antibiotics. Patient recovered in the PACU before transfer to a regular nursing floor. Patient was started on oxycodone, tylenol, and dilaudid for pain control and ASA 81 mg bid for DVT prophylaxis. Physical therapy recommended continued recovery at SNF with continued physical therapy and wound care. On the day of discharge, patient was afebrile with stable vital signs. Patient was neurovascularly intact at time of discharge. Patient was discharged with prescription of ASA 81 mg bid for DVT prophylaxis for 4 weeks. Patient will follow-up with Dr. Linda in 2 weeks for post-operative visit.

## 2023-12-20 NOTE — DISCHARGE INSTRUCTIONS
Follow-Up Instructions  You will need to be seen in clinic by Dr. Linda in 3 weeks for a post-operative evaluation.    You will need to call and schedule an appointment, unless there is a previous appointment that appears on your discharge instructions.  The direct orthopaedic clinic appointment line phone number is [phone].  Please do not delay in calling to make this appointment.    You should also follow up with your primary care provider in 1-2 weeks.    Activity Restrictions  1) No driving until further instructed by your orthopaedic physician, which will be addressed at your outpatient appointments.    2) No driving or operating heavy machinery while taking narcotic pain medication.    3) Weight bearing status --> Weightbearing as tolerated right leg. No restrictions.     Discharge Medications  You have been sent home with the following home medications: Oxycodone, Colace, and Aspirin.  Please wean yourself off the oxycodone, as tolerated. A good time to take the medication is before physical therapy sessions and bedtime. Colace is a stool softener to reduce the narcotic pain medications cause. Take it twice a day while taking narcotic pain medication to ensure you maintain your regular bowel movement frequency. Baby aspirin is taken twice daily to help reduce your risk of blood clots.    You should also take tylenol 650mg every 6 hours as needed to reduce the amount of oxycodone you need for pain.    Wound care instructions:   1) Leave operative dressing in place until postoperative day 7 (12/20/2023). Then remove and leave incision open to air. Let water run freely over incision when showering, do not scrub. Do not soak in pool or tub.    2) Call if any drainage after 7 days, increased redness/warmth/swelling at incision site, abnormal pain/tenderness of the extremity, abnormal swelling of the extremity that does not respond to elevation, SOB/chest pain.

## 2023-12-20 NOTE — H&P
[facility] Department of Orthopaedic Surgery   Surgical History & Physical <30 Days  12/20/2023    Reason for Surgery: Right Ankle Symptomatic Hardware, Stress Fracture Right Tibia  Planned Procedure: Right Ankle Removal of Hardware, Osteotomy Right Tibia & Fibula, Right Tibia IMN    History & Physical Reviewed:  I have reviewed the Pre-Admission History and Physical dated 12/15/2023. Relevant findings and updates are noted below:  No significant changes.    Home medications were reviewed with significant updates noted below:  No significant changes.    ERAS patient?: No    COVID-19 Risk Consent:   Surgeon has reviewed the key risks related to liliam COVID-19 and subsequent sequelae.     12/20/23 at 5:55 AM - Britton Hernandez MD  Orthopaedic Surgery, PGY1

## 2023-12-20 NOTE — CONSULTS
Consults  Acute Pain Service  Mary Marsh is a 68 y.o. year old female patient who presents for R tibia and fibula IM nail with Dr. Lnida on . Acute Pain consulted for block for postoperative pain control.     Anticipated Postop Pain Issues -   Palliative: typically relieved with IV analgesics and regional local anesthetics  Provocative: typically with movement  Quality: typically burning and aching  Radiation: typically none  Severity: typically severe 8-10/10  Timing: typically constant    Past Medical History:   Diagnosis Date    Adrenal nodule (CMS/[facility])     Anemia     Arthritis     Body mass index (BMI) 27.0-27.9, adult     BMI 27.0-27.9,adult    Cataract     Cerebral vascular accident (CMS/[facility])     Closed left hip fracture, initial encounter (CMS/[facility])     s/p IMN    COPD (chronic obstructive pulmonary disease) (CMS/[facility])     Depression     Emphysema of lung (CMS/[facility])     Generalized pruritus     GERD (gastroesophageal reflux disease)     Hypertension     Irritable bowel syndrome     Migraines     Peripheral neuropathy     Right tibial fracture 2023    Urinary incontinence     Vision loss         Past Surgical History:   Procedure Laterality Date    ANKLE SURGERY      APPENDECTOMY      BREAST BIOPSY       SECTION, LOW TRANSVERSE      CHOLECYSTECTOMY      COLONOSCOPY      KNEE SURGERY      ORIF TIBIA FRACTURE          Family History   Problem Relation Name Age of Onset    Diabetes Mother      Hypertension Mother      Lung cancer Father          tobacco use    Cancer Sister      Lung cancer Brother          tobacco use        Social History     Socioeconomic History    Marital status:      Spouse name: Not on file    Number of children: Not on file    Years of education: Not on file    Highest education level: Not on file   Occupational History    Not on file   Tobacco Use    Smoking status: Former     Types: Cigarettes    Smokeless tobacco: Former   Substance and Sexual  Activity    Alcohol use: Never    Drug use: Never    Sexual activity: Defer   Other Topics Concern    Not on file   Social History Narrative    Not on file     Social Determinants of Health     Financial Resource Strain: Not on file   Food Insecurity: Not on file   Transportation Needs: Not on file   Physical Activity: Not on file   Stress: Not on file   Social Connections: Not on file   Intimate Partner Violence: Not on file   Housing Stability: Not on file        Allergies   Allergen Reactions    Amoxicillin Swelling and Other     Amoxicillin TABS Comments: Amoxicillin TABS    Egg Unknown    Nitrofurantoin Monohyd/M-Cryst Other     Macrobid CAPS Comments: Macrobid CAPS         Review of Systems  Gen: No fatigue, anorexia, insomnia, fever.   Eyes: No vision loss, double vision, drainage, eye pain.   ENT: No pharyngitis, dry mouth, no hearing changes or ear discharge  Cardiac: No chest pain, palpitations, syncope, near syncope.   Pulmonary: No shortness of breath, cough, hemoptysis.   Heme/lymph: No swollen glands, fever, bleeding.   GI: No abdominal pain, change in bowel habits, melena, hematemesis, hematochezia, nausea, vomiting, diarrhea.   : No discharge, dysuria, frequency, urgency, hematuria.  Endo: No polyuria or weight loss.   Musculoskeletal: Negative for any pain or loss of ROM/weakness  Skin: No rashes or lesions  Neuro: Normal speech, no numbness or weakness. No gait difficulties  Review of systems is otherwise negative unless stated above or in history of present illness.    Physical Exam:  Constitutional:  no distress, alert and cooperative  Eyes: clear sclera  Head/Neck: No apparent injury, trachea midline  Respiratory/Thorax: Patent airways, thorax symmetric, breathing comfortably  Cardiovascular: no pitting edema  Gastrointestinal: Nondistended  Musculoskeletal: ROM intact  Extremities: no clubbing  Neurological: alert, armstrong x4  Psychological: Appropriate affect    No results found for this or any  previous visit (from the past 24 hour(s)).     Plan:  Mary Marsh is a 68 y.o. year old female patient who presents for R tibia and fibula IM nail with Dr. Linda on 12/20. Acute Pain consulted for block for postoperative pain control.     - R popliteal and saphenous blocks performed preoperatively on 12/20  - Pain medications per primary team  - Will see on POD1 if inpatient    Acute Pain Team  pg [phone] ph [phone].

## 2023-12-20 NOTE — OP NOTE
Removal of hardware right ankle (R), Osteotomy of right tibia and fibula (R), Intramedullary nailing right tibia (R) Operative Note     Date: 2023  OR Location: [facility] OR    Name: Mary Marsh, : 1955, Age: 68 y.o., MRN: [id number], Sex: female    Diagnosis  Pre-op Diagnosis     * Traumatic closed displaced fracture of shaft of tibia, unspecified laterality, initial encounter [S82.209A]     * Closed traumatic displaced fracture of shaft of right tibia, initial encounter [S82.201A] Post-op Diagnosis     * Traumatic closed displaced fracture of shaft of tibia, unspecified laterality, initial encounter [S82.209A]     * Closed traumatic displaced fracture of shaft of right tibia, initial encounter [S82.A]     Procedures  Removal of hardware right ankle  57199 - OR REMOVAL IMPLANT DEEP    Osteotomy of right tibia and fibula  79107 - OR OSTEOTOMY TIBIA & FIBULA    Intramedullary nailing right tibia  71897 - OR TX TIBL SHFT FX IMED IMPLT W/WO SCREWS&/CERCLA    OR REMOVAL IMPLANT DEEP [57575]  OR TX TIBL SHFT FX IMED IMPLT W/WO SCREWS&/CERCLA [88332]  OR OSTEOTOMY TIBIA & FIBULA [57660]  Surgeons      * Rahul Linda - Primary    Resident/Fellow/Other Assistant:  Surgeon(s) and Role:     * Rolo Colon MD - Fellow    Procedure Summary  Anesthesia: General  ASA: III  Anesthesia Staff: Anesthesiologist: Rocky Gerardo DO  Anesthesia Resident: Julio Nava DO  Estimated Blood Loss: 120mL  Intra-op Medications:   Medication Name Total Dose   sodium chloride 0.9 % irrigation solution 1,000 mL              Anesthesia Record               Intraprocedure I/O Totals       None           Specimen: No specimens collected     Staff:   Circulator: Mela Seth RN  Scrub Person: Patricia Guzman; Nasseem Awadallah         Drains and/or Catheters: * None in log *    Tourniquet Times:     Total Tourniquet Time Documented:  Leg (Right) - 21 minutes  Total: Leg (Right) - 21 minutes      Implants:  Implants        Type Name Action Serial No.      Joint GUIDE WIRE, GONZÁLEZ, 3.0MM X 1000MM - [id number] Used, Not Implanted      Joint GUIDE WIRE, GONZÁLEZ, 3.0MM X 1000MM - [id number] Used, Not Implanted      Joint NAIL, TIBIAL, T2 ALPHA, 11 X 360MM - [id number] Implanted      Screw SCREW, ADVANCED LOCKING, 5 X 40MM - [id number] Implanted      Screw SCREW, ADVANCED LOCKING, 5 X 47.5MM - [id number] Implanted      Screw SCREW, ADVANCED LOCKING, 5 X 40MM - [id number] Implanted      Screw SCREW, ADVANCED LOCKING, 5 X 47.5MM - [id number] Implanted      Screw SCREW, ADVANCED LOCKING, 5 X 47.5MM - [id number] Implanted      Screw SCREW, ADVANCED LOCKING, 5 X 42.5MM - [id number] Implanted               Findings: malunion tibia and fibula    Indications: Mary Marsh is an 68 y.o. female who is having surgery for Traumatic closed displaced fracture of shaft of tibia, unspecified laterality, initial encounter [S82.209A]  Closed traumatic displaced fracture of shaft of right tibia, initial encounter [S82.201A].     The patient was seen in the preoperative area. The risks, benefits, complications, treatment options, non-operative alternatives, expected recovery and outcomes were discussed with the patient. The possibilities of reaction to medication, pulmonary aspiration, injury to surrounding structures, bleeding, recurrent infection, the need for additional procedures, failure to diagnose a condition, and creating a complication requiring transfusion or operation were discussed with the patient. The patient concurred with the proposed plan, giving informed consent.  The site of surgery was properly noted/marked if necessary per policy. The patient has been actively warmed in preoperative area. Preoperative antibiotics have been ordered and given within 1 hours of incision. Venous thrombosis prophylaxis have been ordered including chemical prophylaxis    Procedure Details    operative procedure    Preop diagnosis malunion of right midshaft tibia and  fibula, genu valgum right knee with osteoarthritis, leg length inequality,    Postop diagnosis same    Procedure treatment of right tibial malunion using intramedullary nail fixation and bone grafting by technique from medullary canal with correction of angulation and rotation.  2 is removal of the distal tibial plate outside of area of malunion 3 is fibular osteotomy midshaft second incision.    Surgeon eamon      First assistant was Nita bradford physician assistant please note that Nita was required for the entire case and was present the entire case as my first assistant.    Second assistant Rolo Colon fellow    Anesthesia was general    Estimated blood loss was 120 cc    Operative indications this is a 68-year-old female who has had quite a difficult go about the last several years.  She had a left hip fracture which left her with severe arthritis of the left hip.  Her right lower extremity has severe genu valgum with osteoarthritis and she developed a malunion/stress fracture in the midportion of her tibia with valgus and external rotation.  The fibula was healed but short.  The ankle also has some arthritis.  Looking at her mechanical axis she has a 7-1/2 cm lateral axis deviation half of it is from the tibial valgus and half of it is from the knee valgus particularly distal femoral valgus.  At this time I talked her about treating her tibial malunion with intramedullary nailing correction of valgus and external rotation we would need to do a separate fibular osteotomy.  She also has a plate in her distal tibia which is unrelated to the malunion but we have to remove it to put the janna in.  The risks and benefits of all this were discussed with her.  Also discussed that she is going need total knee replacement eventually on the right side but we had to correct this first.    Operative procedure after full huddle was performed the operating on patient adequate general anesthesia.  Patient was given  1.5 g of vancomycin IV because of a positive MRSA screening test.  She was also given TXA.  A tourniquet was placed on proximal right thigh but we only used it infrequently.  We prepped and draped the entire right lower extremity.  We put the patella prep anteriorly.    A surgical pause was performed and we commenced on the surgery.  Incision was made in line with the previous scar over the distal medial malleolus and medial tibia we exposed the plate and removed all the screws.  Please note this was outside of the original malunion which was in the midshaft.  We then moved over to the fibula we did a separate incision just distal to the tibial malunion over the fibula.  We elevated the lateral compartment and anterior compartment and expose the fibula.  We then used a TPS saw protecting the soft tissue and cut obliquely across the fibula.  This was verified on fluoroscopic image.    We then went to the mid portion tibial malunion.  We opened the area anterior laterally and expose the valgus malunion.  Some of it was a nonunion.  We did is initially as we used an osteotome to go through the area of old malunion and then used deep osteotomies technique of multiple drill holes through the posterior cortex with 1 drill hole anteriorly we and into the distal tibia.  We removed the guidewire we put 2 proximal complete the osteotomy for correction of the malunion at this time yet.    We then went proximally and we initially tried to go with the supracondylar nail insertion site above the patella but once we opened this and went through the quadricep the patella was very arthritic and the knee could only maximally extend about 15 to 20 degrees so it was difficult to get the instruments under the patella.  We abandon that we went in for patella then we flexed the knee on a triangle and made an incision over the patella tendon under fluoroscopic image we placed a pin into the starting point the proximal tibia and drove it  into the proximal third of the tibia lined up on the AP and lateral we then reamed with a starter reamer.  We then put a ball-tipped guidewire down and when we are trying to insert this and flex the knee the tibia actually broke at the malunion site completing the deep osteotomy osteotomy for the malunion.  We then drove the guidewire across the malunion into the distal metaphysis.  The janna measured just over 360 so would use a 360 nail.  We then corrected the varus by direct manual palpation and corrected the external rotation of about 20 degrees to neutral we then reamed starting with a 9 reamer and proceeding all the way up to a 12.5.  This created bone graft into the malunion nonunion site which we could visualize we kept it into that area.  We then placed a a 360 x 11 mm [place] T2 nail over the guidewire into the proximal tibia across the malunion osteotomy site and down to the distal metaphysis bearing the nail proximally.  We removed the ball-tipped guidewire and placed 2 interlocking Van screws to the proximal nail with the realignment of the tibia in position.  We then went distally and using perfect [place] technique we put 2 advance screws from medial to lateral through the distal end of the nail locking the alignment.  Overall we had corrected about 10 degrees of valgus to neutral of the tibial shaft and we corrected the rotation as well.  We copiously irrigated all incisions closed with 0 Vicryl suture 2-0 Vicryl and 3-0 nylon suture.  Sterile dressings were applied patient was not placed in a splint.  Patient would be weightbearing as tolerated.  Complications:  None; patient tolerated the procedure well.    Disposition: PACU - hemodynamically stable.  Condition: stable         Additional Details: see note    Attending Attestation: I was present and scrubbed for the entire procedure.    Rahul Linda  Phone Number: [phone]

## 2023-12-20 NOTE — PROCEDURES
Peripheral Block    Patient location during procedure: pre-op  Start time: 12/20/2023 7:44 AM  End time: 12/20/2023 7:57 AM  Reason for block: post-op pain management  Staffing  Performed: resident   Authorized by: Reilly Saucedo DO    Performed by: Reilly Saucedo DO  Preanesthetic Checklist  Completed: patient identified, IV checked, site marked, risks and benefits discussed, surgical consent, monitors and equipment checked, pre-op evaluation and timeout performed   Timeout performed at: 12/20/2023 7:44 AM  Peripheral Block  Patient position: laying flat  Prep: ChloraPrep  Patient monitoring: heart rate and continuous pulse ox  Block type: popliteal and adductor canal  Injection technique: single-shot  Guidance: ultrasound guided  Local infiltration: lidocaine  Needle  Needle type: Tuohy   Needle gauge: 26 G  Needle length: 8 cm  Needle localization: ultrasound guidance     image stored in chart  Assessment  Injection assessment: negative aspiration for heme, no paresthesia on injection, incremental injection and local visualized surrounding nerve on ultrasound  Additional Notes  Popliteal and saphenous nerve block: Informed consent obtained.  Risks, benefits, and alternatives discussed.  ASA monitors placed and timeout performed.  Patient positioned, prepped with chlorhexidine, and draped with sterile towels.  Ultrasound guidance was used to visualize the tibia and common peroneal nerves at the popliteal fossa and surrounding structures with visualization of the needle throughout duration of the procedure.  Aspiration was negative.  15 cc of 0.5% ropivacaine, dexamethasone 4 mg, and 1:200,000 epinephrine injected.      Ultrasound guidance was used to visualize the saphenous nerve at the adductor canal and surrounding structures with visualization of the needle throughout duration of the procedure.  Aspiration was negative.  15 cc of 0.5% ropivacaine, dexamethasone 4 mg, and 1:200,000 epinephrine injected.  Patient  tolerated the procedure well.    Timeout by CLEVELAND Fuentes

## 2023-12-20 NOTE — NURSING NOTE
Pt was unable to void bladder scan taken with 1168 urine recorded.  notified and ordered straight cath, 800 was taken out. Post straight cath done with 468 left.

## 2023-12-21 PROBLEM — I63.9 CVA (CEREBRAL VASCULAR ACCIDENT) (MULTI): Status: RESOLVED | Noted: 2023-12-20 | Resolved: 2023-12-21

## 2023-12-21 LAB
ERYTHROCYTE [DISTWIDTH] IN BLOOD BY AUTOMATED COUNT: 14.9 % (ref 11.5–14.5)
HCT VFR BLD AUTO: 30.9 % (ref 36–46)
HGB BLD-MCNC: 9.4 G/DL (ref 12–16)
MCH RBC QN AUTO: 28.1 PG (ref 26–34)
MCHC RBC AUTO-ENTMCNC: 30.4 G/DL (ref 32–36)
MCV RBC AUTO: 93 FL (ref 80–100)
NRBC BLD-RTO: 0 /100 WBCS (ref 0–0)
PLATELET # BLD AUTO: 286 X10*3/UL (ref 150–450)
RBC # BLD AUTO: 3.34 X10*6/UL (ref 4–5.2)
WBC # BLD AUTO: 8.1 X10*3/UL (ref 4.4–11.3)

## 2023-12-21 PROCEDURE — 97165 OT EVAL LOW COMPLEX 30 MIN: CPT | Mod: GO

## 2023-12-21 PROCEDURE — 97162 PT EVAL MOD COMPLEX 30 MIN: CPT | Mod: GP | Performed by: PHYSICAL THERAPIST

## 2023-12-21 PROCEDURE — 2500000004 HC RX 250 GENERAL PHARMACY W/ HCPCS (ALT 636 FOR OP/ED): Performed by: PHYSICIAN ASSISTANT

## 2023-12-21 PROCEDURE — 2500000001 HC RX 250 WO HCPCS SELF ADMINISTERED DRUGS (ALT 637 FOR MEDICARE OP)

## 2023-12-21 PROCEDURE — 97530 THERAPEUTIC ACTIVITIES: CPT | Mod: GO

## 2023-12-21 PROCEDURE — 99231 SBSQ HOSP IP/OBS SF/LOW 25: CPT | Performed by: STUDENT IN AN ORGANIZED HEALTH CARE EDUCATION/TRAINING PROGRAM

## 2023-12-21 PROCEDURE — 96376 TX/PRO/DX INJ SAME DRUG ADON: CPT

## 2023-12-21 PROCEDURE — 2500000001 HC RX 250 WO HCPCS SELF ADMINISTERED DRUGS (ALT 637 FOR MEDICARE OP): Performed by: ORTHOPAEDIC SURGERY

## 2023-12-21 PROCEDURE — 2500000001 HC RX 250 WO HCPCS SELF ADMINISTERED DRUGS (ALT 637 FOR MEDICARE OP): Performed by: PHYSICIAN ASSISTANT

## 2023-12-21 PROCEDURE — 85027 COMPLETE CBC AUTOMATED: CPT | Performed by: PHYSICIAN ASSISTANT

## 2023-12-21 PROCEDURE — 97110 THERAPEUTIC EXERCISES: CPT | Mod: GP | Performed by: PHYSICAL THERAPIST

## 2023-12-21 PROCEDURE — G0378 HOSPITAL OBSERVATION PER HR: HCPCS

## 2023-12-21 PROCEDURE — 36415 COLL VENOUS BLD VENIPUNCTURE: CPT | Performed by: PHYSICIAN ASSISTANT

## 2023-12-21 RX ORDER — LORAZEPAM 0.5 MG/1
1 TABLET ORAL 2 TIMES DAILY
Status: DISCONTINUED | OUTPATIENT
Start: 2023-12-21 | End: 2023-12-23 | Stop reason: HOSPADM

## 2023-12-21 RX ORDER — OXYCODONE HYDROCHLORIDE 5 MG/1
5 TABLET ORAL EVERY 6 HOURS PRN
Qty: 28 TABLET | Refills: 0 | Status: SHIPPED | OUTPATIENT
Start: 2023-12-21 | End: 2023-12-21 | Stop reason: SDUPTHER

## 2023-12-21 RX ORDER — ASPIRIN 81 MG/1
81 TABLET ORAL 2 TIMES DAILY
Qty: 84 TABLET | Refills: 0 | Status: SHIPPED | OUTPATIENT
Start: 2023-12-21 | End: 2024-02-01

## 2023-12-21 RX ORDER — OXYCODONE HYDROCHLORIDE 5 MG/1
5 TABLET ORAL EVERY 6 HOURS PRN
Qty: 28 TABLET | Refills: 0 | Status: SHIPPED | OUTPATIENT
Start: 2023-12-21 | End: 2024-03-06

## 2023-12-21 RX ORDER — NYSTATIN 100000 [USP'U]/G
1 POWDER TOPICAL 2 TIMES DAILY
Status: DISCONTINUED | OUTPATIENT
Start: 2023-12-21 | End: 2023-12-23 | Stop reason: HOSPADM

## 2023-12-21 RX ADMIN — ACETAMINOPHEN 650 MG: 325 TABLET ORAL at 05:48

## 2023-12-21 RX ADMIN — LISINOPRIL 40 MG: 40 TABLET ORAL at 08:41

## 2023-12-21 RX ADMIN — PANTOPRAZOLE SODIUM 40 MG: 40 TABLET, DELAYED RELEASE ORAL at 08:41

## 2023-12-21 RX ADMIN — OXYCODONE HYDROCHLORIDE 10 MG: 5 TABLET ORAL at 17:06

## 2023-12-21 RX ADMIN — AMLODIPINE BESYLATE 10 MG: 10 TABLET ORAL at 08:41

## 2023-12-21 RX ADMIN — ACETAMINOPHEN 650 MG: 325 TABLET ORAL at 17:05

## 2023-12-21 RX ADMIN — POLYETHYLENE GLYCOL 3350 17 G: 17 POWDER, FOR SOLUTION ORAL at 08:41

## 2023-12-21 RX ADMIN — FLUOXETINE 60 MG: 20 CAPSULE ORAL at 08:41

## 2023-12-21 RX ADMIN — CYCLOBENZAPRINE 10 MG: 10 TABLET, FILM COATED ORAL at 08:41

## 2023-12-21 RX ADMIN — MIRTAZAPINE 7.5 MG: 15 TABLET, FILM COATED ORAL at 21:09

## 2023-12-21 RX ADMIN — ACETAMINOPHEN 650 MG: 325 TABLET ORAL at 11:49

## 2023-12-21 RX ADMIN — LORAZEPAM 1 MG: 0.5 TABLET ORAL at 11:49

## 2023-12-21 RX ADMIN — Medication 1 TABLET: at 08:41

## 2023-12-21 RX ADMIN — OXYCODONE HYDROCHLORIDE 10 MG: 5 TABLET ORAL at 21:07

## 2023-12-21 RX ADMIN — SENNOSIDES 17.2 MG: 8.6 TABLET, FILM COATED ORAL at 08:41

## 2023-12-21 RX ADMIN — LAMOTRIGINE 150 MG: 150 TABLET ORAL at 08:42

## 2023-12-21 RX ADMIN — ASPIRIN 81 MG: 81 TABLET, COATED ORAL at 21:08

## 2023-12-21 RX ADMIN — FENOFIBRATE 160 MG: 160 TABLET ORAL at 08:42

## 2023-12-21 RX ADMIN — TOPIRAMATE 50 MG: 25 CAPSULE, COATED PELLETS ORAL at 08:42

## 2023-12-21 RX ADMIN — NYSTATIN 1 APPLICATION: 100000 POWDER TOPICAL at 17:05

## 2023-12-21 RX ADMIN — Medication 1 TABLET: at 21:08

## 2023-12-21 RX ADMIN — ASPIRIN 81 MG: 81 TABLET, COATED ORAL at 08:41

## 2023-12-21 RX ADMIN — TOPIRAMATE 50 MG: 25 CAPSULE, COATED PELLETS ORAL at 21:08

## 2023-12-21 RX ADMIN — HYDROMORPHONE HYDROCHLORIDE 0.5 MG: 1 INJECTION, SOLUTION INTRAMUSCULAR; INTRAVENOUS; SUBCUTANEOUS at 08:42

## 2023-12-21 RX ADMIN — NYSTATIN 1 APPLICATION: 100000 POWDER TOPICAL at 21:00

## 2023-12-21 RX ADMIN — LORAZEPAM 1 MG: 0.5 TABLET ORAL at 21:07

## 2023-12-21 RX ADMIN — OXYCODONE HYDROCHLORIDE 10 MG: 5 TABLET ORAL at 05:48

## 2023-12-21 RX ADMIN — OXYCODONE HYDROCHLORIDE 10 MG: 5 TABLET ORAL at 10:43

## 2023-12-21 ASSESSMENT — COGNITIVE AND FUNCTIONAL STATUS - GENERAL
WALKING IN HOSPITAL ROOM: A LOT
TURNING FROM BACK TO SIDE WHILE IN FLAT BAD: A LOT
HELP NEEDED FOR BATHING: A LOT
MOBILITY SCORE: 11
TOILETING: A LOT
TURNING FROM BACK TO SIDE WHILE IN FLAT BAD: A LOT
DRESSING REGULAR UPPER BODY CLOTHING: A LITTLE
MOBILITY SCORE: 11
MOVING FROM LYING ON BACK TO SITTING ON SIDE OF FLAT BED WITH BEDRAILS: A LOT
DRESSING REGULAR LOWER BODY CLOTHING: A LOT
DRESSING REGULAR LOWER BODY CLOTHING: A LOT
MOVING FROM LYING ON BACK TO SITTING ON SIDE OF FLAT BED WITH BEDRAILS: A LOT
CLIMB 3 TO 5 STEPS WITH RAILING: TOTAL
DRESSING REGULAR UPPER BODY CLOTHING: A LITTLE
DAILY ACTIVITIY SCORE: 16
PERSONAL GROOMING: A LITTLE
DAILY ACTIVITIY SCORE: 16
STANDING UP FROM CHAIR USING ARMS: A LOT
HELP NEEDED FOR BATHING: A LOT
PERSONAL GROOMING: A LITTLE
CLIMB 3 TO 5 STEPS WITH RAILING: TOTAL
WALKING IN HOSPITAL ROOM: A LOT
MOVING TO AND FROM BED TO CHAIR: A LOT
MOVING TO AND FROM BED TO CHAIR: A LOT
STANDING UP FROM CHAIR USING ARMS: A LOT
TOILETING: A LOT

## 2023-12-21 ASSESSMENT — ACTIVITIES OF DAILY LIVING (ADL): ADL_ASSISTANCE: INDEPENDENT

## 2023-12-21 ASSESSMENT — PAIN SCALES - GENERAL
PAINLEVEL_OUTOF10: 9
PAINLEVEL_OUTOF10: 6
PAINLEVEL_OUTOF10: 10 - WORST POSSIBLE PAIN
PAINLEVEL_OUTOF10: 7
PAINLEVEL_OUTOF10: 9
PAINLEVEL_OUTOF10: 5 - MODERATE PAIN
PAINLEVEL_OUTOF10: 8
PAINLEVEL_OUTOF10: 7
PAINLEVEL_OUTOF10: 8

## 2023-12-21 ASSESSMENT — PAIN - FUNCTIONAL ASSESSMENT
PAIN_FUNCTIONAL_ASSESSMENT: 0-10

## 2023-12-21 ASSESSMENT — PAIN DESCRIPTION - LOCATION: LOCATION: LEG

## 2023-12-21 ASSESSMENT — PAIN DESCRIPTION - ORIENTATION: ORIENTATION: RIGHT

## 2023-12-21 NOTE — CARE PLAN
The patient's goals for the shift include pain managment    The clinical goals for the shift include manage pain    Over the shift, the patient did not make progress toward the following goals. Barriers to progression include none. Recommendations to address these barriers include none  Problem: Pain - Adult  Goal: Verbalizes/displays adequate comfort level or baseline comfort level  Outcome: Progressing     Problem: Safety - Adult  Goal: Free from fall injury  Outcome: Progressing     Problem: Discharge Planning  Goal: Discharge to home or other facility with appropriate resources  Outcome: Progressing     Problem: Chronic Conditions and Co-morbidities  Goal: Patient's chronic conditions and co-morbidity symptoms are monitored and maintained or improved  Outcome: Progressing     Problem: Pain  Goal: Takes deep breaths with improved pain control throughout the shift  Outcome: Progressing  Goal: Turns in bed with improved pain control throughout the shift  Outcome: Progressing  Goal: Walks with improved pain control throughout the shift  Outcome: Progressing  Goal: Performs ADL's with improved pain control throughout shift  Outcome: Progressing  Goal: Participates in PT with improved pain control throughout the shift  Outcome: Progressing  Goal: Free from opioid side effects throughout the shift  Outcome: Progressing  Goal: Free from acute confusion related to pain meds throughout the shift  Outcome: Progressing     Problem: Skin  Goal: Decreased wound size/increased tissue granulation at next dressing change  Outcome: Progressing  Goal: Participates in plan/prevention/treatment measures  Outcome: Progressing  Goal: Prevent/manage excess moisture  Outcome: Progressing  Goal: Prevent/minimize sheer/friction injuries  Outcome: Progressing  Goal: Promote/optimize nutrition  Outcome: Progressing  Goal: Promote skin healing  Outcome: Progressing     Problem: Fall/Injury  Goal: Not fall by end of shift  Outcome:  Progressing  Goal: Be free from injury by end of the shift  Outcome: Progressing  Goal: Verbalize understanding of personal risk factors for fall in the hospital  Outcome: Progressing  Goal: Verbalize understanding of risk factor reduction measures to prevent injury from fall in the home  Outcome: Progressing  Goal: Use assistive devices by end of the shift  Outcome: Progressing  Goal: Pace activities to prevent fatigue by end of the shift  Outcome: Progressing   .

## 2023-12-21 NOTE — PROGRESS NOTES
Acute Pain Service  Postop Pain HPI -   Palliative: relieved with IV analgesics and regional local anesthetics  Provocative: movement  Quality:  burning and aching  Radiation:  none  Severity:  10/10  Timing: constant    24-HOUR OPIOID CONSUMPTION:  0.8 mg dilaudid, 30 mg oxycodone    Scheduled medications  acetaminophen, 650 mg, oral, q6h DOROTHY  amLODIPine, 10 mg, oral, Daily  aspirin, 81 mg, oral, BID  calcium carbonate-vitamin D3, 1 tablet, oral, BID  fenofibrate, 160 mg, oral, Daily  FLUoxetine, 60 mg, oral, Daily  gabapentin, 100 mg, oral, Nightly  lamoTRIgine, 150 mg, oral, Daily  lisinopril, 40 mg, oral, Daily  LORazepam, 1 mg, oral, BID  mirtazapine, 7.5 mg, oral, Nightly  pantoprazole, 40 mg, oral, Daily  polyethylene glycol, 17 g, oral, Daily  sennosides, 2 tablet, oral, BID  topiramate, 50 mg, oral, BID      Continuous medications  lactated Ringer's, 100 mL/hr, Last Rate: 100 mL/hr (12/20/23 1454)  oxygen, 2 L/min      PRN medications  PRN medications: bisacodyl, cyclobenzaprine, diphenhydrAMINE, glycerin, HYDROmorphone, naloxone, ondansetron ODT **OR** ondansetron, oxyCODONE, oxyCODONE, promethazine **OR** promethazine, traMADol     Physical Exam:  Constitutional:  no distress, alert and cooperative  Eyes: clear sclera  Head/Neck: No apparent injury, trachea midline  Respiratory/Thorax: Patent airways, thorax symmetric, breathing comfortably  Cardiovascular: no pitting edema  Gastrointestinal: Nondistended  Musculoskeletal: ROM intact  Extremities: no clubbing  Neurological: alert, armstrong x4  Psychological: Appropriate affect    Results for orders placed or performed during the hospital encounter of 12/20/23 (from the past 24 hour(s))   CBC   Result Value Ref Range    WBC 8.1 4.4 - 11.3 x10*3/uL    nRBC 0.0 0.0 - 0.0 /100 WBCs    RBC 3.34 (L) 4.00 - 5.20 x10*6/uL    Hemoglobin 9.4 (L) 12.0 - 16.0 g/dL    Hematocrit 30.9 (L) 36.0 - 46.0 %    MCV 93 80 - 100 fL    MCH 28.1 26.0 - 34.0 pg    MCHC 30.4 (L) 32.0 -  36.0 g/dL    RDW 14.9 (H) 11.5 - 14.5 %    Platelets 286 150 - 450 x10*3/uL          Plan:  Mary Marsh is a 68 y.o. year old female patient who presents for R tibia and fibula IM nail with Dr. Linda on 12/20. Acute Pain consulted for block for postoperative pain control.      - R popliteal and saphenous blocks performed preoperatively on 12/20  - Pain medications per primary team  - Will sign off    Acute Pain Service Resident  pg [phone] ph [phone]

## 2023-12-21 NOTE — PROGRESS NOTES
Physical Therapy    Physical Therapy Evaluation & Treatment    Patient Name: Mary Marsh  MRN: [id number]  Today's Date: 12/21/2023   Time Calculation  Start Time: 0943  Stop Time: 1010  Time Calculation (min): 27 min    Assessment/Plan   PT Assessment  PT Assessment Results: Decreased strength, Decreased range of motion, Decreased endurance, Decreased mobility, Impaired balance  Rehab Prognosis: Excellent  End of Session Communication: Bedside nurse  Assessment Comment: pt is s/p R tibia IMN and tib/fib osteotomy for LLD  End of Session Patient Position: Bed, 2 rail up   IP OR SWING BED PT PLAN  Inpatient or Swing Bed: Inpatient  PT Plan  PT Plan: Skilled PT  PT Frequency: 5 times per week  PT Discharge Recommendations: Moderate intensity level of continued care  PT Recommended Transfer Status: Assist x1  PT - OK to Discharge: Yes (eval received and recs made)      Subjective     General Visit Information:  General  Reason for Referral: pt s/p R tibia IMNand tib/fib osteotomy for LLD  Past Medical History Relevant to Rehab: pt with h/o adrenal nodule, anemia, arthritis, CVA, COPD, depression, IMN for L hip fx.vision loss, urinary incontinenc, GERD, HTN  Family/Caregiver Present: No  Prior to Session Communication: Bedside nurse  Patient Position Received: Bed, 2 rail up  General Comment: pt is cooperative and willing to participate but very anxious  Home Living:  Home Living  Type of Home: Mobile home  Lives With: Alone  Home Adaptive Equipment: Walker rolling or standard, Wheelchair-manual  Home Layout: One level  Home Access: Ramped entrance  Home Living Comments: pt fell transferring to  from couch  Prior Level of Function:  Prior Function Per Pt/Caregiver Report  Level of [place]: Independent with ADLs and functional transfers  Precautions:     Vital Signs:  Vital Signs  Heart Rate: 72  Heart Rate Source: Monitor  SpO2: 96 %  BP: 118/68  BP Location: Left arm  BP Method: Automatic  Patient Position:  Lying    Objective   Pain:  Pain Assessment  Pain Assessment: 0-10  Pain Score: 7  Pain Location: Foot  Pain Orientation: Right  Cognition:  Cognition  Orientation Level: Oriented X4    General Assessments:  Activity Tolerance  Endurance: Endurance does not limit participation in activity            Static Sitting Balance  Static Sitting-Balance Support: Feet supported  Static Sitting-Level of Assistance: Contact guard    Static Standing Balance  Static Standing-Balance Support: Bilateral upper extremity supported  Static Standing-Level of Assistance: Minimum assistance  Functional Assessments:  Bed Mobility  Bed Mobility: Yes  Bed Mobility 1  Bed Mobility 1: Supine to sitting, Sitting to supine  Level of Assistance 1: Moderate assistance    Transfers  Transfer: Yes  Transfer 1  Transfer From 1: Sit to, Stand to  Transfer to 1: Stand, Sit  Technique 1: Sit to stand, Stand to sit  Transfer Device 1: Walker  Transfer Level of Assistance 1: Maximum assistance, Moderate assistance  Trials/Comments 1: less assist for sitting down.    Ambulation/Gait Training  Ambulation/Gait Training Performed: Yes  Ambulation/Gait Training 1  Surface 1: Level tile  Device 1: Rolling walker  Assistance 1: Moderate assistance  Quality of Gait 1: Narrow base of support, Decreased step length, Inconsistent stride length (unable to clear foot, shuffling foot across floor for 2 ft)  Comments/Distance (ft) 1: 2 ft latearlly to the R    Stairs  Stairs: No  Extremity/Trunk Assessments:  RLE   RLE : Exceptions to WFL  PROM RLE (degrees)  R Knee Flexion 0-130: 80  Strength RLE  RLE Overall Strength: Greater than or equal to 3/5 as evidenced by functional mobility  LLE   LLE : Within Functional Limits  Treatments:  Therapeutic Exercise  Therapeutic Exercise Performed: Yes  Therapeutic Exercise Activity 1: supine: HS, Ap, GS, QS, hip abd/add 1 x 10 each    Therapeutic Activity  Therapeutic Activity Performed: Yes  Therapeutic Activity 1: pt stood  for 5 mins with mod assist    Bed Mobility  Bed Mobility: Yes  Bed Mobility 1  Bed Mobility 1: Supine to sitting, Sitting to supine  Level of Assistance 1: Moderate assistance    Ambulation/Gait Training  Ambulation/Gait Training Performed: Yes  Ambulation/Gait Training 1  Surface 1: Level tile  Device 1: Rolling walker  Assistance 1: Moderate assistance  Quality of Gait 1: Narrow base of support, Decreased step length, Inconsistent stride length (unable to clear foot, shuffling foot across floor for 2 ft)  Comments/Distance (ft) 1: 2 ft latearlly to the R  Transfers  Transfer: Yes  Transfer 1  Transfer From 1: Sit to, Stand to  Transfer to 1: Stand, Sit  Technique 1: Sit to stand, Stand to sit  Transfer Device 1: Walker  Transfer Level of Assistance 1: Maximum assistance, Moderate assistance  Trials/Comments 1: less assist for sitting down.    Stairs  Stairs: No  Outcome Measures:  [facility] Basic Mobility  Turning from your back to your side while in a flat bed without using bedrails: A lot  Moving from lying on your back to sitting on the side of a flat bed without using bedrails: A lot  Moving to and from bed to chair (including a wheelchair): A lot  Standing up from a chair using your arms (e.g. wheelchair or bedside chair): A lot  To walk in hospital room: A lot  Climbing 3-5 steps with railing: Total  Basic Mobility - Total Score: 11    Encounter Problems       Encounter Problems (Active)       Balance       STG - Maintains static standing balance with upper extremity support for 5 mins with CGA (Progressing)       Start:  12/21/23    Expected End:  01/04/24       INTERVENTIONS:  1. Practice standing with minimal support.  2. Educate patient about standing tolerance.  3. Educate patient about independence with gait, transfers, and ADL's.  4. Educate patient about use of assistive device.  5. Educate patient about self-directed care.            Mobility       STG - Patient will ambulate 25 ft with CGA and ww   (Progressing)       Start:  12/21/23    Expected End:  01/04/24               Pain - Adult          Transfers       STG - Transfer from bed to chair with CGA  (Progressing)       Start:  12/21/23    Expected End:  01/04/24            STG - Patient will perform bed mobility I  (Progressing)       Start:  12/21/23    Expected End:  01/04/24                   Education Documentation  Precautions, taught by Mary Cano, PT at 12/21/2023 11:35 AM.  Learner: Patient  Readiness: Eager  Method: Explanation  Response: Needs Reinforcement    Mobility Training, taught by Mary Cano, PT at 12/21/2023 11:35 AM.  Learner: Patient  Readiness: Eager  Method: Explanation  Response: Needs Reinforcement    Education Comments  No comments found.

## 2023-12-21 NOTE — PROGRESS NOTES
I met with patient at the bedside regarding discharge planning and home going needs. Patient lives in a trailer home alone and she is usually independent with ADL's she does have a wheelchair and walker in home. Patient is medically cleared for discharge recommended moderate intensity therapy, patient will be given a list of choices and referrals will be placed. I will continue to follow with a safe discharge plan.

## 2023-12-21 NOTE — CARE PLAN
The patient's goals for the shift include pain managment    The clinical goals for the shift include Manage pain      Problem: Pain - Adult  Goal: Verbalizes/displays adequate comfort level or baseline comfort level  Outcome: Progressing     Problem: Safety - Adult  Goal: Free from fall injury  Outcome: Progressing     Problem: Discharge Planning  Goal: Discharge to home or other facility with appropriate resources  Outcome: Progressing     Problem: Chronic Conditions and Co-morbidities  Goal: Patient's chronic conditions and co-morbidity symptoms are monitored and maintained or improved  Outcome: Progressing     Problem: Pain  Goal: Takes deep breaths with improved pain control throughout the shift  Outcome: Progressing  Goal: Turns in bed with improved pain control throughout the shift  Outcome: Progressing  Goal: Walks with improved pain control throughout the shift  Outcome: Progressing  Goal: Performs ADL's with improved pain control throughout shift  Outcome: Progressing  Goal: Participates in PT with improved pain control throughout the shift  Outcome: Progressing  Goal: Free from opioid side effects throughout the shift  Outcome: Progressing  Goal: Free from acute confusion related to pain meds throughout the shift  Outcome: Progressing     Problem: Skin  Goal: Decreased wound size/increased tissue granulation at next dressing change  Outcome: Progressing  Goal: Participates in plan/prevention/treatment measures  Outcome: Progressing  Goal: Prevent/manage excess moisture  Outcome: Progressing  Goal: Prevent/minimize sheer/friction injuries  Outcome: Progressing  Goal: Promote/optimize nutrition  Outcome: Progressing  Goal: Promote skin healing  Outcome: Progressing     Problem: Fall/Injury  Goal: Not fall by end of shift  Outcome: Progressing  Goal: Be free from injury by end of the shift  Outcome: Progressing  Goal: Verbalize understanding of personal risk factors for fall in the hospital  Outcome:  Progressing  Goal: Verbalize understanding of risk factor reduction measures to prevent injury from fall in the home  Outcome: Progressing  Goal: Use assistive devices by end of the shift  Outcome: Progressing  Goal: Pace activities to prevent fatigue by end of the shift  Outcome: Progressing

## 2023-12-21 NOTE — PROGRESS NOTES
"Orthopaedic Surgery Progress Note    Subjective:  No acute events overnight. Pain well controlled. Denies chest pain, shortness of breath, or fevers. Patient reports she lives at home alone and does not have support available to her at home.    Objective:  /60 (BP Location: Left arm)   Pulse 68   Temp 36.9 °C (98.4 °F) (Temporal)   Resp 16   Ht 1.829 m (6' 0.01\")   Wt 84.7 kg (186 lb 11.7 oz)   SpO2 96%   BMI 25.32 kg/m²     Gen: arousable, NAD, appropriately conversational  Cardiac: RRR to peripheral palpation  Resp: nonlabored on RA  GI: soft, nondistended  MSK:  RLE:   - OR dressings in place without strike through  - appropriately tender postoperative extremity   - Motor intact in DF/PF/EHL/FHL  - SILT in saph/sural/SPN/DPN distributions  - Foot wwp, 2+ DP/PT pulse, brisk cap refill  - Compartments soft and compressible, no pain with passive dorsiflexion      No results found for this or any previous visit (from the past 24 hour(s)).    FL less than 1 hour   Final Result          Assessment/Plan: 68 y.o. female s/p Right Tibia IMN & Tibia/Fibula Osteotomy for LLD on 12/20/2023 with Dr. Linda.      Plan:  - Weight bearing: WBAT RLE  - DVT ppx: SCDs, ASA 81 mg BID  - Diet: Regular as tolerated  - Pain: Tylenol, oxycodone 5/10, dilaudid breakthrough  - Antibiotics: 24H vanc complete  - FEN: mIVF LR @ 100cc/hr; HLIV with good PO intake; monitor BMP  - Lines: maintain PIVx2 while inpatient  - Bowel Regimen: Miralax, senna, dulcolax  - PT/OT: consulted  - Pulm: Encourage IS, maintain O2 sat >92%  - Cardiac: no issues  - Glycemic: no issues  - Continue home medications  - Fuller: none   - Drains: none     Dispo: Pending PT/OT today, Anticipate SNF    Britton Hernandez MD  Orthopedic Surgery PGY-1  [facility]  Available by Epic Chat    While inpatient, this patient will be followed by the Orthopaedic Trauma team. Please see contact information below:    Ortho Trauma  Britton Hernandez MD PGY1   Idy " MD Joseluis PGY1   Millicent Lang, DO PGY2  Anupam Rivera, DO PGY3    Please page [phone] (ortho on-call) after 6pm and on weekends.

## 2023-12-21 NOTE — PROGRESS NOTES
SW updated pt on moderate intensity PT rec. Patient is agreeable to SNF. [facility] is [facility], pt states it is close to her son. Pt asked SW to update pt son. Referral submitted for review, will follow up with son once response is received.    ARIEL Eaton

## 2023-12-22 PROCEDURE — 97110 THERAPEUTIC EXERCISES: CPT | Mod: GP | Performed by: PHYSICAL THERAPIST

## 2023-12-22 PROCEDURE — G0378 HOSPITAL OBSERVATION PER HR: HCPCS

## 2023-12-22 PROCEDURE — 97530 THERAPEUTIC ACTIVITIES: CPT | Mod: GP | Performed by: PHYSICAL THERAPIST

## 2023-12-22 PROCEDURE — 2500000001 HC RX 250 WO HCPCS SELF ADMINISTERED DRUGS (ALT 637 FOR MEDICARE OP)

## 2023-12-22 PROCEDURE — 2500000004 HC RX 250 GENERAL PHARMACY W/ HCPCS (ALT 636 FOR OP/ED): Performed by: PHYSICIAN ASSISTANT

## 2023-12-22 PROCEDURE — 2500000001 HC RX 250 WO HCPCS SELF ADMINISTERED DRUGS (ALT 637 FOR MEDICARE OP): Performed by: PHYSICIAN ASSISTANT

## 2023-12-22 PROCEDURE — 96376 TX/PRO/DX INJ SAME DRUG ADON: CPT

## 2023-12-22 PROCEDURE — 2500000001 HC RX 250 WO HCPCS SELF ADMINISTERED DRUGS (ALT 637 FOR MEDICARE OP): Performed by: ORTHOPAEDIC SURGERY

## 2023-12-22 RX ADMIN — Medication 1 TABLET: at 09:29

## 2023-12-22 RX ADMIN — OXYCODONE HYDROCHLORIDE 10 MG: 5 TABLET ORAL at 11:52

## 2023-12-22 RX ADMIN — PANTOPRAZOLE SODIUM 40 MG: 40 TABLET, DELAYED RELEASE ORAL at 09:29

## 2023-12-22 RX ADMIN — FLUOXETINE 60 MG: 20 CAPSULE ORAL at 09:29

## 2023-12-22 RX ADMIN — Medication 1 TABLET: at 20:04

## 2023-12-22 RX ADMIN — ACETAMINOPHEN 650 MG: 325 TABLET ORAL at 01:18

## 2023-12-22 RX ADMIN — OXYCODONE HYDROCHLORIDE 10 MG: 5 TABLET ORAL at 01:17

## 2023-12-22 RX ADMIN — LISINOPRIL 40 MG: 40 TABLET ORAL at 09:29

## 2023-12-22 RX ADMIN — LAMOTRIGINE 150 MG: 150 TABLET ORAL at 09:29

## 2023-12-22 RX ADMIN — LORAZEPAM 1 MG: 0.5 TABLET ORAL at 09:29

## 2023-12-22 RX ADMIN — MIRTAZAPINE 7.5 MG: 15 TABLET, FILM COATED ORAL at 20:04

## 2023-12-22 RX ADMIN — LORAZEPAM 1 MG: 0.5 TABLET ORAL at 20:04

## 2023-12-22 RX ADMIN — GABAPENTIN 100 MG: 100 CAPSULE ORAL at 20:04

## 2023-12-22 RX ADMIN — OXYCODONE HYDROCHLORIDE 10 MG: 5 TABLET ORAL at 17:52

## 2023-12-22 RX ADMIN — FENOFIBRATE 160 MG: 160 TABLET ORAL at 09:28

## 2023-12-22 RX ADMIN — HYDROMORPHONE HYDROCHLORIDE 0.5 MG: 1 INJECTION, SOLUTION INTRAMUSCULAR; INTRAVENOUS; SUBCUTANEOUS at 20:04

## 2023-12-22 RX ADMIN — ACETAMINOPHEN 650 MG: 325 TABLET ORAL at 06:41

## 2023-12-22 RX ADMIN — ACETAMINOPHEN 650 MG: 325 TABLET ORAL at 11:52

## 2023-12-22 RX ADMIN — OXYCODONE HYDROCHLORIDE 10 MG: 5 TABLET ORAL at 06:41

## 2023-12-22 RX ADMIN — ASPIRIN 81 MG: 81 TABLET, COATED ORAL at 20:04

## 2023-12-22 RX ADMIN — TOPIRAMATE 50 MG: 25 CAPSULE, COATED PELLETS ORAL at 09:28

## 2023-12-22 RX ADMIN — NYSTATIN 1 APPLICATION: 100000 POWDER TOPICAL at 20:05

## 2023-12-22 RX ADMIN — AMLODIPINE BESYLATE 10 MG: 10 TABLET ORAL at 09:29

## 2023-12-22 RX ADMIN — ASPIRIN 81 MG: 81 TABLET, COATED ORAL at 09:29

## 2023-12-22 RX ADMIN — TOPIRAMATE 50 MG: 25 CAPSULE, COATED PELLETS ORAL at 20:04

## 2023-12-22 SDOH — SOCIAL STABILITY: SOCIAL INSECURITY: HAVE YOU HAD THOUGHTS OF HARMING ANYONE ELSE?: NO

## 2023-12-22 ASSESSMENT — COGNITIVE AND FUNCTIONAL STATUS - GENERAL
MOVING FROM LYING ON BACK TO SITTING ON SIDE OF FLAT BED WITH BEDRAILS: A LITTLE
MOBILITY SCORE: 12
TURNING FROM BACK TO SIDE WHILE IN FLAT BAD: A LITTLE
WALKING IN HOSPITAL ROOM: TOTAL
STANDING UP FROM CHAIR USING ARMS: A LOT
MOVING TO AND FROM BED TO CHAIR: A LOT
CLIMB 3 TO 5 STEPS WITH RAILING: TOTAL

## 2023-12-22 ASSESSMENT — PAIN DESCRIPTION - ORIENTATION: ORIENTATION: RIGHT

## 2023-12-22 ASSESSMENT — PAIN - FUNCTIONAL ASSESSMENT
PAIN_FUNCTIONAL_ASSESSMENT: 0-10

## 2023-12-22 ASSESSMENT — PAIN SCALES - GENERAL
PAINLEVEL_OUTOF10: 6
PAINLEVEL_OUTOF10: 9
PAINLEVEL_OUTOF10: 6
PAINLEVEL_OUTOF10: 9
PAINLEVEL_OUTOF10: 6
PAINLEVEL_OUTOF10: 8
PAINLEVEL_OUTOF10: 6
PAINLEVEL_OUTOF10: 9
PAINLEVEL_OUTOF10: 9
PAINLEVEL_OUTOF10: 8

## 2023-12-22 ASSESSMENT — PAIN DESCRIPTION - LOCATION: LOCATION: LEG

## 2023-12-22 NOTE — CARE PLAN
Problem: Pain - Adult  Goal: Verbalizes/displays adequate comfort level or baseline comfort level  Outcome: Progressing     Problem: Safety - Adult  Goal: Free from fall injury  Outcome: Progressing     Problem: Discharge Planning  Goal: Discharge to home or other facility with appropriate resources  Outcome: Progressing     Problem: Chronic Conditions and Co-morbidities  Goal: Patient's chronic conditions and co-morbidity symptoms are monitored and maintained or improved  Outcome: Progressing     Problem: Pain  Goal: Takes deep breaths with improved pain control throughout the shift  Outcome: Progressing  Goal: Turns in bed with improved pain control throughout the shift  Outcome: Progressing  Goal: Walks with improved pain control throughout the shift  Outcome: Progressing  Goal: Performs ADL's with improved pain control throughout shift  Outcome: Progressing  Goal: Participates in PT with improved pain control throughout the shift  Outcome: Progressing  Goal: Free from opioid side effects throughout the shift  Outcome: Progressing  Goal: Free from acute confusion related to pain meds throughout the shift  Outcome: Progressing     Problem: Skin  Goal: Decreased wound size/increased tissue granulation at next dressing change  Outcome: Progressing  Goal: Participates in plan/prevention/treatment measures  Outcome: Progressing  Goal: Prevent/manage excess moisture  Outcome: Progressing  Goal: Prevent/minimize sheer/friction injuries  Outcome: Progressing  Goal: Promote/optimize nutrition  Outcome: Progressing  Goal: Promote skin healing  Outcome: Progressing     Problem: Fall/Injury  Goal: Not fall by end of shift  Outcome: Progressing  Goal: Be free from injury by end of the shift  Outcome: Progressing  Goal: Verbalize understanding of personal risk factors for fall in the hospital  Outcome: Progressing  Goal: Verbalize understanding of risk factor reduction measures to prevent injury from fall in the  home  Outcome: Progressing  Goal: Use assistive devices by end of the shift  Outcome: Progressing  Goal: Pace activities to prevent fatigue by end of the shift  Outcome: Progressing   The patient's goals for the shift include pain managment    The clinical goals for the shift include patient will remain free of new injury throughout the shift

## 2023-12-22 NOTE — CARE PLAN
The patient's goals for the shift include pain managment    The clinical goals for the shift include PATIENT WILL NOT HAVE SKING BREAKDOWN DURING SHIFT

## 2023-12-22 NOTE — PROGRESS NOTES
Patient going to [facility]  being transferred by [facility] @1030 12/23/2023 patient medical team  facility and nurse notified

## 2023-12-22 NOTE — PROGRESS NOTES
[facility] accepted. SCOOTER updated pt and son Gilberto  per pts request. Patient can admit 12/23 after 3 midnight stay. SCOOTER updated [facility]. Pending ADOD.     ARIEL Eaton

## 2023-12-22 NOTE — PROGRESS NOTES
"Orthopaedic Surgery Progress Note    Subjective:  No acute events overnight. Pain well controlled. Denies chest pain, shortness of breath, or fevers.     Objective:  BP 96/56 (BP Location: Left arm, Patient Position: Lying) Comment: nurse bola notified  Pulse 70   Temp 37.1 °C (98.8 °F) (Temporal)   Resp 18   Ht 1.829 m (6' 0.01\")   Wt 84.7 kg (186 lb 11.7 oz)   SpO2 95%   BMI 25.32 kg/m²     Gen: arousable, NAD, appropriately conversational  Cardiac: RRR to peripheral palpation  Resp: nonlabored on RA  GI: soft, nondistended  MSK:  RLE:   - OR dressings in place without strike through  - appropriately tender postoperative extremity   - Motor intact in DF/PF/EHL/FHL  - SILT in saph/sural/SPN/DPN distributions  - Foot wwp, 2+ DP/PT pulse, brisk cap refill  - Compartments soft and compressible, no pain with passive dorsiflexion      Results for orders placed or performed during the hospital encounter of 12/20/23 (from the past 24 hour(s))   CBC   Result Value Ref Range    WBC 8.1 4.4 - 11.3 x10*3/uL    nRBC 0.0 0.0 - 0.0 /100 WBCs    RBC 3.34 (L) 4.00 - 5.20 x10*6/uL    Hemoglobin 9.4 (L) 12.0 - 16.0 g/dL    Hematocrit 30.9 (L) 36.0 - 46.0 %    MCV 93 80 - 100 fL    MCH 28.1 26.0 - 34.0 pg    MCHC 30.4 (L) 32.0 - 36.0 g/dL    RDW 14.9 (H) 11.5 - 14.5 %    Platelets 286 150 - 450 x10*3/uL       FL less than 1 hour   Final Result          Assessment/Plan: 68 y.o. female s/p Right Tibia IMN & Tibia/Fibula Osteotomy for LLD on 12/20/2023 with Dr. Linda.      Plan:  - Weight bearing: WBAT RLE  - DVT ppx: SCDs, ASA 81 mg BID  - Diet: Regular as tolerated  - Pain: Tylenol, oxycodone 5/10, dilaudid breakthrough  - Antibiotics: 24H vanc complete  - FEN: mIVF LR @ 100cc/hr; HLIV with good PO intake; monitor BMP  - Lines: maintain PIVx2 while inpatient  - Bowel Regimen: Miralax, senna, dulcolax  - PT/OT: consulted  - Pulm: Encourage IS, maintain O2 sat >92%  - Cardiac: no issues  - Glycemic: no issues  - Continue home " medications  - Fuller: none   - Drains: none     Dispo: SNF,  since 12/21    Millicent Lang DO     While inpatient, this patient will be followed by the Orthopaedic Trauma team. Please see contact information below:    Ortho Trauma  Britton Hernandez MD PGY1   Villa Huggins MD PGY1   Millicent Lang DO PGY2  Anupam Rivera DO PGY3    Please page [phone] (ortho on-call) after 6pm and on weekends.

## 2023-12-23 VITALS
HEART RATE: 61 BPM | OXYGEN SATURATION: 94 % | DIASTOLIC BLOOD PRESSURE: 65 MMHG | BODY MASS INDEX: 25.29 KG/M2 | TEMPERATURE: 98.4 F | HEIGHT: 72 IN | RESPIRATION RATE: 16 BRPM | WEIGHT: 186.73 LBS | SYSTOLIC BLOOD PRESSURE: 107 MMHG

## 2023-12-23 PROCEDURE — G0378 HOSPITAL OBSERVATION PER HR: HCPCS

## 2023-12-23 PROCEDURE — 2500000001 HC RX 250 WO HCPCS SELF ADMINISTERED DRUGS (ALT 637 FOR MEDICARE OP): Performed by: PHYSICIAN ASSISTANT

## 2023-12-23 PROCEDURE — 2500000001 HC RX 250 WO HCPCS SELF ADMINISTERED DRUGS (ALT 637 FOR MEDICARE OP): Performed by: ORTHOPAEDIC SURGERY

## 2023-12-23 PROCEDURE — 2500000001 HC RX 250 WO HCPCS SELF ADMINISTERED DRUGS (ALT 637 FOR MEDICARE OP)

## 2023-12-23 PROCEDURE — 2500000004 HC RX 250 GENERAL PHARMACY W/ HCPCS (ALT 636 FOR OP/ED): Performed by: PHYSICIAN ASSISTANT

## 2023-12-23 RX ADMIN — LORAZEPAM 1 MG: 0.5 TABLET ORAL at 10:26

## 2023-12-23 RX ADMIN — AMLODIPINE BESYLATE 10 MG: 10 TABLET ORAL at 12:26

## 2023-12-23 RX ADMIN — FENOFIBRATE 160 MG: 160 TABLET ORAL at 10:24

## 2023-12-23 RX ADMIN — ACETAMINOPHEN 650 MG: 325 TABLET ORAL at 00:23

## 2023-12-23 RX ADMIN — ACETAMINOPHEN 650 MG: 325 TABLET ORAL at 06:04

## 2023-12-23 RX ADMIN — TOPIRAMATE 50 MG: 25 CAPSULE, COATED PELLETS ORAL at 10:25

## 2023-12-23 RX ADMIN — SENNOSIDES 17.2 MG: 8.6 TABLET, FILM COATED ORAL at 10:25

## 2023-12-23 RX ADMIN — LAMOTRIGINE 150 MG: 150 TABLET ORAL at 10:24

## 2023-12-23 RX ADMIN — PANTOPRAZOLE SODIUM 40 MG: 40 TABLET, DELAYED RELEASE ORAL at 10:24

## 2023-12-23 RX ADMIN — OXYCODONE HYDROCHLORIDE 10 MG: 5 TABLET ORAL at 00:23

## 2023-12-23 RX ADMIN — FLUOXETINE 60 MG: 20 CAPSULE ORAL at 10:25

## 2023-12-23 RX ADMIN — ASPIRIN 81 MG: 81 TABLET, COATED ORAL at 10:25

## 2023-12-23 RX ADMIN — Medication 1 TABLET: at 10:24

## 2023-12-23 RX ADMIN — ACETAMINOPHEN 650 MG: 325 TABLET ORAL at 12:27

## 2023-12-23 ASSESSMENT — PAIN - FUNCTIONAL ASSESSMENT: PAIN_FUNCTIONAL_ASSESSMENT: 0-10

## 2023-12-23 ASSESSMENT — PAIN SCALES - GENERAL
PAINLEVEL_OUTOF10: 9
PAINLEVEL_OUTOF10: 0 - NO PAIN
PAINLEVEL_OUTOF10: 5 - MODERATE PAIN

## 2023-12-23 NOTE — PROGRESS NOTES
"Orthopaedic Surgery Progress Note    Subjective:  No acute events overnight. Pain well controlled. Denies chest pain, shortness of breath, or fevers.     Objective:  /65 (BP Location: Left arm, Patient Position: Lying)   Pulse 61   Temp 36.9 °C (98.4 °F) (Temporal)   Resp 16   Ht 1.829 m (6' 0.01\")   Wt 84.7 kg (186 lb 11.7 oz)   SpO2 94%   BMI 25.32 kg/m²     Gen: arousable, NAD, appropriately conversational  Cardiac: RRR to peripheral palpation  Resp: nonlabored on RA  GI: soft, nondistended  MSK:  RLE:   - OR dressings in place without strike through  - appropriately tender postoperative extremity   - Motor intact in DF/PF/EHL/FHL  - SILT in saph/sural/SPN/DPN distributions  - Foot wwp, 2+ DP/PT pulse, brisk cap refill  - Compartments soft and compressible, no pain with passive dorsiflexion      No results found for this or any previous visit (from the past 24 hour(s)).      FL less than 1 hour   Final Result          Assessment/Plan: 68 y.o. female s/p Right Tibia IMN & Tibia/Fibula Osteotomy for LLD on 12/20/2023 with Dr. Linda.      Plan:  - Weight bearing: WBAT RLE  - DVT ppx: SCDs, ASA 81 mg BID  - Diet: Regular as tolerated  - Pain: Tylenol, oxycodone 5/10, dilaudid breakthrough  - Antibiotics: 24H vanc complete  - FEN: mIVF LR @ 100cc/hr; HLIV with good PO intake; monitor BMP  - Lines: maintain PIVx2 while inpatient  - Bowel Regimen: Miralax, senna, dulcolax  - PT/OT: consulted  - Pulm: Encourage IS, maintain O2 sat >92%  - Cardiac: no issues  - Glycemic: no issues  - Continue home medications  - Fuller: none   - Drains: none     Dispo: [facility] transport 12/23 at 1:30pm,  since 12/21    Villa Huggins MD     While inpatient, this patient will be followed by the Orthopaedic Trauma team. Please see contact information below:    Ortho Trauma  Britton Hernandez MD PGY1   Villa Huggins MD PGY1   Millicent Lang,  PGY2  Anupam Imbrogno, DO PGY3    Please page [phone] (ortho on-call) after 6pm and on " weekends.

## 2023-12-23 NOTE — PROGRESS NOTES
"Mary Marsh is a 68 y.o. female on day 1 of admission presenting with Post-traumatic arthritis of right ankle.  Patient is medically ready for discharge.  Transportation is set for 1:30 [facility] [phone].  Nursing Report [phone] for [facility].  PASSRR is done         Last Recorded Vitals  Blood pressure 107/65, pulse 61, temperature 36.9 °C (98.4 °F), temperature source Temporal, resp. rate 16, height 1.829 m (6' 0.01\"), weight 84.7 kg (186 lb 11.7 oz), SpO2 94 %.  Intake/Output last 3 Shifts:  I/O last 3 completed shifts:  In: - (0 mL/kg)   Out: 300 (3.5 mL/kg) [Urine:300 (0.1 mL/kg/hr)]  Weight: 84.7 kg         Assessment/Plan   Principal Problem:    Post-traumatic arthritis of right ankle  Active Problems:    Gastroesophageal reflux disease    Closed displaced oblique fracture of shaft of right tibia with malunion, subsequent encounter    Traumatic closed displaced fracture of shaft of tibia    Traumatic closed displaced fracture of shaft of right tibia      George Lei RN      "

## 2023-12-23 NOTE — DISCHARGE SUMMARY
Discharge Diagnosis  Post-traumatic arthritis of right ankle    Issues Requiring Follow-Up  Routine postop follow up    Test Results Pending At Discharge  Pending Labs       No current pending labs.            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 Patient is now s/p intramedullary nail right tibia with bone grafting and tibia and fibula osteotomies on 12/20/2023 by Dr. Linda. On the day of surgery, patient was identified in the pre-operative holding area and agreeable to proceed with surgery. Written consent was obtained.  Please see operative note for further details of this procedure. Patient received 24 hours of francseca-operative antibiotics. Patient recovered in the PACU before transfer to a regular nursing floor. Patient was started on oxycodone, tylenol, and dilaudid for pain control and ASA 81 mg bid for DVT prophylaxis. Physical therapy recommended continued recovery at SNF with continued physical therapy and wound care. On the day of discharge, patient was afebrile with stable vital signs. Patient was neurovascularly intact at time of discharge. Patient was discharged with prescription of ASA 81 mg bid for DVT prophylaxis for 4 weeks. Patient will follow-up with Dr. Linda in 2 weeks for post-operative visit.      Pertinent Physical Exam At Time of Discharge  Gen: arousable, NAD, appropriately conversational  Cardiac: RRR to peripheral palpation  Resp: nonlabored on RA  GI: soft, nondistended  MSK:  RLE:   - OR dressings in place without strike through  - appropriately tender postoperative extremity   - Motor intact in DF/PF/EHL/FHL  - SILT in saph/sural/SPN/DPN distributions  - Foot wwp, 2+ DP/PT pulse, brisk cap refill  - Compartments soft and compressible, no pain with passive dorsiflexion       Home Medications     Medication List      START taking these medications      oxyCODONE 5 mg immediate release tablet; Commonly known as: Roxicodone;   Take 1 tablet (5 mg) by mouth every 6 hours if needed for severe pain (7 -   10).     CHANGE how you take these medications     aspirin 81 mg EC tablet; Take 1 tablet (81 mg) by mouth 2 times a day.;   What changed: when to take this     CONTINUE taking these medications     acetaminophen 325 mg tablet; Commonly known as: Tylenol   * amLODIPine 10 mg tablet; Commonly known as: Norvasc   * amLODIPine 5 mg tablet; Commonly known as: Norvasc; Take 1 tablet (5   mg) by mouth once daily.   benazepril 40 mg tablet; Commonly known as: Lotensin; Take 1 tablet (40   mg) by mouth once daily.   calcium carbonate-vitamin D3 600 mg-5 mcg (200 unit) tablet   dicyclomine 10 mg capsule; Commonly known as: Bentyl; TAKE 1 CAPSULE (10   MG) BY MOUTH EVERY 6 HOURS IF NEEDED   doxycycline 100 mg tablet; Commonly known as: Vibra-Tabs   ergocalciferol 1.25 MG (68685 UT) capsule; Commonly known as: Vitamin   D-2; TAKE 1 CAPSULE BY MOUTH ONE TIME PER WEEK   fenofibrate 160 mg tablet; Commonly known as: Triglide; Take 1 tablet   (160 mg) by mouth once daily.   ferrous sulfate (325 mg ferrous sulfate) tablet; TAKE 1 TABLET BY MOUTH   EVERY DAY WITH FOOD   FLUoxetine 20 mg capsule; Commonly known as: PROzac   gabapentin 100 mg capsule; Commonly known as: Neurontin; Take 1 capsule   (100 mg) by mouth once daily at bedtime.   LACTAID ORAL   lamoTRIgine 150 mg tablet; Commonly known as: LaMICtal   LORazepam 1 mg tablet; Commonly known as: Ativan   mirtazapine 7.5 mg tablet; Commonly known as: Remeron   multivitamin with minerals tablet   pantoprazole 40 mg EC tablet; Commonly known as: ProtoNix; Take 1 tablet   (40 mg) by mouth once daily.   potassium chloride CR 20 mEq ER tablet; Commonly known as: Klor-Con M20   topiramate 25 mg capsule; Commonly known as: Topamax Sprinkle; TAKE 2   CAPSULES (50 MG) BY MOUTH 2 TIMES A DAY.  * This list has 2 medication(s) that are the  same as other medications   prescribed for you. Read the directions carefully, and ask your doctor or   other care provider to review them with you.     STOP taking these medications     ibuprofen 200 mg tablet       Outpatient Follow-Up  Future Appointments   Date Time Provider Department Center   1/15/2024 11:40 AM Nita Plummer PA-C [id number]       Villa Huggins MD

## 2023-12-28 ENCOUNTER — LAB REQUISITION (OUTPATIENT)
Dept: LAB | Facility: HOSPITAL | Age: 68
End: 2023-12-28
Payer: MEDICARE

## 2023-12-28 ENCOUNTER — TELEPHONE (OUTPATIENT)
Dept: ORTHOPEDIC SURGERY | Facility: HOSPITAL | Age: 68
End: 2023-12-28
Payer: MEDICARE

## 2023-12-28 DIAGNOSIS — R52 PAIN, UNSPECIFIED: ICD-10-CM

## 2023-12-28 DIAGNOSIS — R53.1 WEAKNESS: ICD-10-CM

## 2023-12-28 LAB
ANION GAP SERPL CALC-SCNC: 12 MMOL/L (ref 10–20)
BUN SERPL-MCNC: 7 MG/DL (ref 6–23)
CALCIUM SERPL-MCNC: 9.6 MG/DL (ref 8.6–10.3)
CHLORIDE SERPL-SCNC: 106 MMOL/L (ref 98–107)
CO2 SERPL-SCNC: 23 MMOL/L (ref 21–32)
CREAT SERPL-MCNC: 0.95 MG/DL (ref 0.5–1.05)
ERYTHROCYTE [DISTWIDTH] IN BLOOD BY AUTOMATED COUNT: 14.7 % (ref 11.5–14.5)
GFR SERPL CREATININE-BSD FRML MDRD: 66 ML/MIN/1.73M*2
GLUCOSE SERPL-MCNC: 86 MG/DL (ref 74–99)
HCT VFR BLD AUTO: 31 % (ref 36–46)
HGB BLD-MCNC: 10 G/DL (ref 12–16)
MCH RBC QN AUTO: 28.5 PG (ref 26–34)
MCHC RBC AUTO-ENTMCNC: 32.3 G/DL (ref 32–36)
MCV RBC AUTO: 88 FL (ref 80–100)
NRBC BLD-RTO: 0 /100 WBCS (ref 0–0)
PLATELET # BLD AUTO: 466 X10*3/UL (ref 150–450)
POTASSIUM SERPL-SCNC: 4.2 MMOL/L (ref 3.5–5.3)
RBC # BLD AUTO: 3.51 X10*6/UL (ref 4–5.2)
SODIUM SERPL-SCNC: 137 MMOL/L (ref 136–145)
WBC # BLD AUTO: 6.2 X10*3/UL (ref 4.4–11.3)

## 2023-12-28 PROCEDURE — 85027 COMPLETE CBC AUTOMATED: CPT

## 2023-12-28 PROCEDURE — 80048 BASIC METABOLIC PNL TOTAL CA: CPT

## 2023-12-28 NOTE — TELEPHONE ENCOUNTER
Spoke to Zuri paz, nurse at facility regarding call from nightshift nurse c/o redness and warm incision site. Per Zuri paz's assessment, there is a little redness but denies and other symptoms. Patient is still on Doxycycline. Advised nurse to call back immediately  if redness worsens or if new onset of edema, draining from site, and if patient develops fever/chills. Zuri Paz repeated symptoms and verbalized understanding.

## 2024-01-13 DIAGNOSIS — I10 HYPERTENSION, UNSPECIFIED TYPE: Primary | ICD-10-CM

## 2024-01-13 DIAGNOSIS — Z00.00 ENCOUNTER FOR GENERAL ADULT MEDICAL EXAMINATION WITHOUT ABNORMAL FINDINGS: ICD-10-CM

## 2024-01-15 ENCOUNTER — HOME HEALTH ADMISSION (OUTPATIENT)
Dept: HOME HEALTH SERVICES | Facility: HOME HEALTH | Age: 69
End: 2024-01-15
Payer: MEDICARE

## 2024-01-15 ENCOUNTER — DOCUMENTATION (OUTPATIENT)
Dept: HOME HEALTH SERVICES | Facility: HOME HEALTH | Age: 69
End: 2024-01-15

## 2024-01-15 ENCOUNTER — HOSPITAL ENCOUNTER (OUTPATIENT)
Dept: RADIOLOGY | Facility: HOSPITAL | Age: 69
Discharge: HOME | End: 2024-01-15
Payer: MEDICARE

## 2024-01-15 ENCOUNTER — OFFICE VISIT (OUTPATIENT)
Dept: ORTHOPEDIC SURGERY | Facility: HOSPITAL | Age: 69
End: 2024-01-15
Payer: MEDICARE

## 2024-01-15 DIAGNOSIS — S82.401K TIBIA/FIBULA FRACTURE, SHAFT, RIGHT, CLOSED, WITH NONUNION, SUBSEQUENT ENCOUNTER: Primary | ICD-10-CM

## 2024-01-15 DIAGNOSIS — S82.101D CLOSED FRACTURE OF PROXIMAL END OF RIGHT TIBIA WITH ROUTINE HEALING, UNSPECIFIED FRACTURE MORPHOLOGY, SUBSEQUENT ENCOUNTER: ICD-10-CM

## 2024-01-15 DIAGNOSIS — S82.201K TIBIA/FIBULA FRACTURE, SHAFT, RIGHT, CLOSED, WITH NONUNION, SUBSEQUENT ENCOUNTER: Primary | ICD-10-CM

## 2024-01-15 PROCEDURE — 73590 X-RAY EXAM OF LOWER LEG: CPT | Mod: RIGHT SIDE | Performed by: RADIOLOGY

## 2024-01-15 PROCEDURE — 73590 X-RAY EXAM OF LOWER LEG: CPT | Mod: RT

## 2024-01-15 PROCEDURE — 1036F TOBACCO NON-USER: CPT | Performed by: PHYSICIAN ASSISTANT

## 2024-01-15 PROCEDURE — 99024 POSTOP FOLLOW-UP VISIT: CPT | Performed by: PHYSICIAN ASSISTANT

## 2024-01-15 PROCEDURE — 1126F AMNT PAIN NOTED NONE PRSNT: CPT | Performed by: PHYSICIAN ASSISTANT

## 2024-01-15 PROCEDURE — 1111F DSCHRG MED/CURRENT MED MERGE: CPT | Performed by: PHYSICIAN ASSISTANT

## 2024-01-15 NOTE — PROGRESS NOTES
Patient is a 68 y.o. female who is 3.5 weeks s/p treatment of R midshaft tibia nonunion with IMN and bone graft by technique.  Date of surgery was 12/20/2023.  Patient continues RLE at this time and denies issues with incision. Patient continues on ASA for DVT ppx. Patient has just returned home from SNF, states that [facility] PT has not been set up yet.  She is primarily in the wheelchair, using her walker to ambulate to bathroom in the home.  Patient denies fever or chills, N/T or calf pain.     General: Alert and oriented x 3, NAD, respirations easy and unlabored with no audible wheezes, skin warm and dry, speech and dress appropriate for noted age, affect euthymic.     Musculoskeletal: RLE  incisions c/d/i  mild swelling to lower leg  compartments soft  no calf tenderness  sensation intact to light touch  motor intact including TA/GS/EHL  palpable DP/PT pulses 2+     X-ray: Images of R tibia reviewed personally by me today and reveal maintenance of alignment of R tibial nonunion with improved alignment,  hardware in position and no interval change.      IMP:  Problem List Items Addressed This Visit       Closed fracture of proximal end of right tibia with routine healing    Relevant Orders    XR tibia fibula right 2 views       PLAN:  Her sutures were removed today and steri strips applied. I have placed HC referral for PT sessions, she is WBAT RLE. I will see patient back in 4 weeks and I need x-rays R tibia next visit. All questions were answered today.

## 2024-01-15 NOTE — HH CARE COORDINATION
Home Care received a Referral for Physical Therapy. We have processed the referral for a Start of Care on 1/16/24 1-2 days.     If you have any questions or concerns, please feel free to contact us at [phone]. Follow the prompts, enter your five digit zip code, and you will be directed to your care team on WE[facility] 3.

## 2024-01-18 ENCOUNTER — APPOINTMENT (OUTPATIENT)
Dept: PRIMARY CARE | Facility: CLINIC | Age: 69
End: 2024-01-18
Payer: MEDICARE

## 2024-01-18 ENCOUNTER — HOME CARE VISIT (OUTPATIENT)
Dept: HOME HEALTH SERVICES | Facility: HOME HEALTH | Age: 69
End: 2024-01-18
Payer: MEDICARE

## 2024-01-18 VITALS — SYSTOLIC BLOOD PRESSURE: 116 MMHG | HEART RATE: 66 BPM | DIASTOLIC BLOOD PRESSURE: 69 MMHG

## 2024-01-18 PROCEDURE — 1090000001 HH PPS REVENUE CREDIT

## 2024-01-18 PROCEDURE — 169592 NO-PAY CLAIM PROCEDURE

## 2024-01-18 PROCEDURE — 0023 HH SOC

## 2024-01-18 PROCEDURE — 1090000002 HH PPS REVENUE DEBIT

## 2024-01-18 PROCEDURE — G0151 HHCP-SERV OF PT,EA 15 MIN: HCPCS | Mod: HHH

## 2024-01-18 SDOH — ECONOMIC STABILITY: HOUSING INSECURITY: HOME SAFETY: SMALL MOBILE HOME, NO HOT WATER IN KITCHEN

## 2024-01-18 ASSESSMENT — ENCOUNTER SYMPTOMS
HYPERTENSION: 1
OCCASIONAL FEELINGS OF UNSTEADINESS: 0
DENIES PAIN: 1
PERSON REPORTING PAIN: PATIENT

## 2024-01-18 ASSESSMENT — ACTIVITIES OF DAILY LIVING (ADL)
ENTERING_EXITING_HOME: MODERATE ASSIST
OASIS_M1830: 05
AMBULATION ASSISTANCE ON FLAT SURFACES: 1

## 2024-01-18 NOTE — HOME HEALTH
patient seen for pt admission into c services today.  patient went to the hospital after falling in her mobilie home fracturing her r tibia requiring surgery back in april of last year .   ordered hhc due to decline in function and independence .  she lives alone in a mobilie home with a ramp to exit .  she does have good family support .prior to this she was indep with dressing and bathing .  she has a wc and a rollator that she uses around the home

## 2024-01-19 ENCOUNTER — HOME CARE VISIT (OUTPATIENT)
Dept: HOME HEALTH SERVICES | Facility: HOME HEALTH | Age: 69
End: 2024-01-19
Payer: MEDICARE

## 2024-01-19 PROCEDURE — 1090000001 HH PPS REVENUE CREDIT

## 2024-01-19 PROCEDURE — 1090000002 HH PPS REVENUE DEBIT

## 2024-01-19 RX ORDER — POTASSIUM CHLORIDE 1500 MG/1
20 TABLET, EXTENDED RELEASE ORAL DAILY
Qty: 90 TABLET | Refills: 1 | OUTPATIENT
Start: 2024-01-19

## 2024-01-20 PROCEDURE — 1090000001 HH PPS REVENUE CREDIT

## 2024-01-20 PROCEDURE — 1090000002 HH PPS REVENUE DEBIT

## 2024-01-21 PROCEDURE — 1090000001 HH PPS REVENUE CREDIT

## 2024-01-21 PROCEDURE — 1090000002 HH PPS REVENUE DEBIT

## 2024-01-22 PROCEDURE — 1090000001 HH PPS REVENUE CREDIT

## 2024-01-22 PROCEDURE — 1090000002 HH PPS REVENUE DEBIT

## 2024-01-23 ENCOUNTER — HOME CARE VISIT (OUTPATIENT)
Dept: HOME HEALTH SERVICES | Facility: HOME HEALTH | Age: 69
End: 2024-01-23
Payer: MEDICARE

## 2024-01-23 PROCEDURE — G0157 HHC PT ASSISTANT EA 15: HCPCS | Mod: HHH

## 2024-01-23 PROCEDURE — 1090000002 HH PPS REVENUE DEBIT

## 2024-01-23 PROCEDURE — 1090000001 HH PPS REVENUE CREDIT

## 2024-01-23 RX ORDER — AMLODIPINE BESYLATE 10 MG/1
10 TABLET ORAL
Qty: 45 TABLET | Refills: 0 | Status: SHIPPED | OUTPATIENT
Start: 2024-01-23 | End: 2024-03-05 | Stop reason: SDUPTHER

## 2024-01-23 ASSESSMENT — ENCOUNTER SYMPTOMS: DENIES PAIN: 1

## 2024-01-23 NOTE — TELEPHONE ENCOUNTER
Spoke with patient and scheduled appt for 3/5 and told her will give enough medication  until her appt.

## 2024-01-24 PROCEDURE — 1090000002 HH PPS REVENUE DEBIT

## 2024-01-24 PROCEDURE — 1090000001 HH PPS REVENUE CREDIT

## 2024-01-25 ENCOUNTER — APPOINTMENT (OUTPATIENT)
Dept: HOME HEALTH SERVICES | Facility: HOME HEALTH | Age: 69
End: 2024-01-25
Payer: MEDICARE

## 2024-01-25 PROCEDURE — 1090000002 HH PPS REVENUE DEBIT

## 2024-01-25 PROCEDURE — 1090000001 HH PPS REVENUE CREDIT

## 2024-01-26 PROCEDURE — 1090000002 HH PPS REVENUE DEBIT

## 2024-01-26 PROCEDURE — 1090000001 HH PPS REVENUE CREDIT

## 2024-01-27 PROCEDURE — 1090000002 HH PPS REVENUE DEBIT

## 2024-01-27 PROCEDURE — 1090000001 HH PPS REVENUE CREDIT

## 2024-01-28 PROCEDURE — 1090000001 HH PPS REVENUE CREDIT

## 2024-01-28 PROCEDURE — 1090000002 HH PPS REVENUE DEBIT

## 2024-01-29 PROCEDURE — 1090000001 HH PPS REVENUE CREDIT

## 2024-01-29 PROCEDURE — 1090000002 HH PPS REVENUE DEBIT

## 2024-01-30 ENCOUNTER — APPOINTMENT (OUTPATIENT)
Dept: HOME HEALTH SERVICES | Facility: HOME HEALTH | Age: 69
End: 2024-01-30
Payer: MEDICARE

## 2024-01-30 PROCEDURE — 1090000002 HH PPS REVENUE DEBIT

## 2024-01-30 PROCEDURE — 1090000001 HH PPS REVENUE CREDIT

## 2024-01-31 PROCEDURE — 1090000001 HH PPS REVENUE CREDIT

## 2024-01-31 PROCEDURE — 1090000002 HH PPS REVENUE DEBIT

## 2024-02-01 ENCOUNTER — HOME CARE VISIT (OUTPATIENT)
Dept: HOME HEALTH SERVICES | Facility: HOME HEALTH | Age: 69
End: 2024-02-01
Payer: MEDICARE

## 2024-02-01 PROCEDURE — 1090000002 HH PPS REVENUE DEBIT

## 2024-02-01 PROCEDURE — 1090000001 HH PPS REVENUE CREDIT

## 2024-02-01 PROCEDURE — G0180 MD CERTIFICATION HHA PATIENT: HCPCS | Performed by: PHYSICIAN ASSISTANT

## 2024-02-02 PROCEDURE — 1090000002 HH PPS REVENUE DEBIT

## 2024-02-02 PROCEDURE — 1090000001 HH PPS REVENUE CREDIT

## 2024-02-03 PROCEDURE — 1090000001 HH PPS REVENUE CREDIT

## 2024-02-03 PROCEDURE — 1090000002 HH PPS REVENUE DEBIT

## 2024-02-04 PROCEDURE — 1090000002 HH PPS REVENUE DEBIT

## 2024-02-04 PROCEDURE — 1090000001 HH PPS REVENUE CREDIT

## 2024-02-05 DIAGNOSIS — K21.9 GASTROESOPHAGEAL REFLUX DISEASE, UNSPECIFIED WHETHER ESOPHAGITIS PRESENT: ICD-10-CM

## 2024-02-05 PROCEDURE — 1090000001 HH PPS REVENUE CREDIT

## 2024-02-05 PROCEDURE — 1090000002 HH PPS REVENUE DEBIT

## 2024-02-06 PROCEDURE — 1090000002 HH PPS REVENUE DEBIT

## 2024-02-06 PROCEDURE — 1090000001 HH PPS REVENUE CREDIT

## 2024-02-06 RX ORDER — PANTOPRAZOLE SODIUM 40 MG/1
40 TABLET, DELAYED RELEASE ORAL DAILY
Qty: 30 TABLET | Refills: 0 | Status: SHIPPED | OUTPATIENT
Start: 2024-02-06 | End: 2024-03-05

## 2024-02-07 PROCEDURE — 1090000002 HH PPS REVENUE DEBIT

## 2024-02-07 PROCEDURE — 1090000001 HH PPS REVENUE CREDIT

## 2024-02-08 PROCEDURE — 1090000001 HH PPS REVENUE CREDIT

## 2024-02-08 PROCEDURE — 1090000002 HH PPS REVENUE DEBIT

## 2024-02-09 ENCOUNTER — HOME CARE VISIT (OUTPATIENT)
Dept: HOME HEALTH SERVICES | Facility: HOME HEALTH | Age: 69
End: 2024-02-09
Payer: MEDICARE

## 2024-02-09 PROCEDURE — 1090000002 HH PPS REVENUE DEBIT

## 2024-02-09 PROCEDURE — 1090000001 HH PPS REVENUE CREDIT

## 2024-02-09 PROCEDURE — G0157 HHC PT ASSISTANT EA 15: HCPCS | Mod: HHH

## 2024-02-09 ASSESSMENT — ENCOUNTER SYMPTOMS
PAIN LOCATION: RIGHT KNEE
HIGHEST PAIN SEVERITY IN PAST 24 HOURS: 7/10
LOWEST PAIN SEVERITY IN PAST 24 HOURS: 3/10
PAIN SEVERITY GOAL: 0/10
PAIN: 1
PERSON REPORTING PAIN: PATIENT
PAIN LOCATION: ABDOMEN

## 2024-02-10 PROCEDURE — 1090000002 HH PPS REVENUE DEBIT

## 2024-02-10 PROCEDURE — 1090000001 HH PPS REVENUE CREDIT

## 2024-02-11 PROCEDURE — 1090000001 HH PPS REVENUE CREDIT

## 2024-02-11 PROCEDURE — 1090000002 HH PPS REVENUE DEBIT

## 2024-02-12 ENCOUNTER — APPOINTMENT (OUTPATIENT)
Dept: ORTHOPEDIC SURGERY | Facility: HOSPITAL | Age: 69
End: 2024-02-12
Payer: MEDICARE

## 2024-02-12 PROCEDURE — 1090000002 HH PPS REVENUE DEBIT

## 2024-02-12 PROCEDURE — 1090000001 HH PPS REVENUE CREDIT

## 2024-02-13 PROCEDURE — 1090000001 HH PPS REVENUE CREDIT

## 2024-02-13 PROCEDURE — 1090000002 HH PPS REVENUE DEBIT

## 2024-02-14 ENCOUNTER — HOME CARE VISIT (OUTPATIENT)
Dept: HOME HEALTH SERVICES | Facility: HOME HEALTH | Age: 69
End: 2024-02-14
Payer: MEDICARE

## 2024-02-14 PROCEDURE — 1090000001 HH PPS REVENUE CREDIT

## 2024-02-14 PROCEDURE — 1090000002 HH PPS REVENUE DEBIT

## 2024-02-14 PROCEDURE — G0157 HHC PT ASSISTANT EA 15: HCPCS | Mod: HHH

## 2024-02-14 ASSESSMENT — ENCOUNTER SYMPTOMS
SUBJECTIVE PAIN PROGRESSION: UNCHANGED
PERSON REPORTING PAIN: PATIENT
LOWEST PAIN SEVERITY IN PAST 24 HOURS: 3/10
HIGHEST PAIN SEVERITY IN PAST 24 HOURS: 4/10
PAIN LOCATION: RIGHT KNEE
PAIN: 1
PAIN SEVERITY GOAL: 0/10
PAIN LOCATION - EXACERBATING FACTORS: WEIGHT BEARING

## 2024-02-15 PROCEDURE — 1090000001 HH PPS REVENUE CREDIT

## 2024-02-15 PROCEDURE — 1090000002 HH PPS REVENUE DEBIT

## 2024-02-16 PROCEDURE — 1090000002 HH PPS REVENUE DEBIT

## 2024-02-16 PROCEDURE — 1090000001 HH PPS REVENUE CREDIT

## 2024-02-17 PROCEDURE — 1090000002 HH PPS REVENUE DEBIT

## 2024-02-17 PROCEDURE — 1090000001 HH PPS REVENUE CREDIT

## 2024-02-18 PROCEDURE — 1090000001 HH PPS REVENUE CREDIT

## 2024-02-18 PROCEDURE — 1090000002 HH PPS REVENUE DEBIT

## 2024-02-19 ENCOUNTER — HOME CARE VISIT (OUTPATIENT)
Dept: HOME HEALTH SERVICES | Facility: HOME HEALTH | Age: 69
End: 2024-02-19
Payer: MEDICARE

## 2024-02-19 PROCEDURE — 1090000003 HH PPS REVENUE ADJ

## 2024-02-19 PROCEDURE — 0023 HH SOC

## 2024-02-19 PROCEDURE — 1090000002 HH PPS REVENUE DEBIT

## 2024-02-19 PROCEDURE — 1090000001 HH PPS REVENUE CREDIT

## 2024-02-19 PROCEDURE — G0151 HHCP-SERV OF PT,EA 15 MIN: HCPCS | Mod: HHH

## 2024-02-19 SDOH — HEALTH STABILITY: PHYSICAL HEALTH: EXERCISE TYPE: HEP LIMITED DUE TO R KNEE PAIN

## 2024-02-19 ASSESSMENT — ACTIVITIES OF DAILY LIVING (ADL)
AMBULATION ASSISTANCE ON FLAT SURFACES: 1
OASIS_M1830: 01
HOME_HEALTH_OASIS: 00

## 2024-02-19 ASSESSMENT — ENCOUNTER SYMPTOMS
HIGHEST PAIN SEVERITY IN PAST 24 HOURS: 5/10
PAIN: 1
LOWEST PAIN SEVERITY IN PAST 24 HOURS: 0/10
PAIN LOCATION: RIGHT KNEE
PERSON REPORTING PAIN: PATIENT

## 2024-02-20 DIAGNOSIS — R10.84 GENERALIZED ABDOMINAL PAIN: ICD-10-CM

## 2024-02-20 RX ORDER — DICYCLOMINE HYDROCHLORIDE 10 MG/1
CAPSULE ORAL
Qty: 360 CAPSULE | Refills: 0 | Status: SHIPPED | OUTPATIENT
Start: 2024-02-20 | End: 2024-05-28

## 2024-02-29 ENCOUNTER — TELEPHONE (OUTPATIENT)
Dept: ORTHOPEDIC SURGERY | Facility: CLINIC | Age: 69
End: 2024-02-29
Payer: MEDICARE

## 2024-02-29 NOTE — TELEPHONE ENCOUNTER
RCopied from CRM #[id number]. Topic: Transfer to Department for Scheduling  >> Feb 29, 2024  9:48 AM Deejay FERNANDEZ wrote:  CRM created and been sent to appropriate department.    APPOINTMENT NEEDS RESCHEDULED FROM PO THAT WAS ON MONDAY. HAS OPEN WOUND. PATIENT STATED SHE CALLED OFFICE ON MONDAY AND LEFT MESSAGES WITH NO RETURN CALL BACK. GOOD PHONE NUMBER [phone]

## 2024-02-29 NOTE — TELEPHONE ENCOUNTER
Pt said she called and LM at [phone], the office phone number, and hadn't received a call back. Pt c/o redness, swelling, yellow drainage since Monday. C/O pain.     Spoke with Nita Ardon PA-C, to place on PA schedule tomorrow at 1030.    Beverly Nichole LPN

## 2024-03-01 ENCOUNTER — HOSPITAL ENCOUNTER (OUTPATIENT)
Dept: RADIOLOGY | Facility: HOSPITAL | Age: 69
Discharge: HOME | End: 2024-03-01
Payer: MEDICARE

## 2024-03-01 ENCOUNTER — OFFICE VISIT (OUTPATIENT)
Dept: ORTHOPEDIC SURGERY | Facility: HOSPITAL | Age: 69
End: 2024-03-01
Payer: MEDICARE

## 2024-03-01 DIAGNOSIS — S82.201K TIBIA/FIBULA FRACTURE, SHAFT, RIGHT, CLOSED, WITH NONUNION, SUBSEQUENT ENCOUNTER: ICD-10-CM

## 2024-03-01 DIAGNOSIS — S82.401K TIBIA/FIBULA FRACTURE, SHAFT, RIGHT, CLOSED, WITH NONUNION, SUBSEQUENT ENCOUNTER: ICD-10-CM

## 2024-03-01 DIAGNOSIS — S82.101D CLOSED FRACTURE OF PROXIMAL END OF RIGHT TIBIA WITH ROUTINE HEALING, UNSPECIFIED FRACTURE MORPHOLOGY, SUBSEQUENT ENCOUNTER: Primary | ICD-10-CM

## 2024-03-01 PROCEDURE — 73590 X-RAY EXAM OF LOWER LEG: CPT | Mod: RT

## 2024-03-01 PROCEDURE — 73590 X-RAY EXAM OF LOWER LEG: CPT | Mod: RIGHT SIDE | Performed by: RADIOLOGY

## 2024-03-01 PROCEDURE — 99024 POSTOP FOLLOW-UP VISIT: CPT | Performed by: PHYSICIAN ASSISTANT

## 2024-03-01 PROCEDURE — 1036F TOBACCO NON-USER: CPT | Performed by: PHYSICIAN ASSISTANT

## 2024-03-01 PROCEDURE — 1125F AMNT PAIN NOTED PAIN PRSNT: CPT | Performed by: PHYSICIAN ASSISTANT

## 2024-03-01 PROCEDURE — 1159F MED LIST DOCD IN RCRD: CPT | Performed by: PHYSICIAN ASSISTANT

## 2024-03-01 PROCEDURE — 1160F RVW MEDS BY RX/DR IN RCRD: CPT | Performed by: PHYSICIAN ASSISTANT

## 2024-03-01 RX ORDER — SULFAMETHOXAZOLE AND TRIMETHOPRIM 800; 160 MG/1; MG/1
1 TABLET ORAL 2 TIMES DAILY
Qty: 28 TABLET | Refills: 0 | Status: SHIPPED | OUTPATIENT
Start: 2024-03-01 | End: 2024-03-15

## 2024-03-01 ASSESSMENT — PAIN SCALES - GENERAL: PAINLEVEL_OUTOF10: 5 - MODERATE PAIN

## 2024-03-01 ASSESSMENT — PAIN - FUNCTIONAL ASSESSMENT: PAIN_FUNCTIONAL_ASSESSMENT: 0-10

## 2024-03-01 ASSESSMENT — PAIN DESCRIPTION - DESCRIPTORS: DESCRIPTORS: ACHING

## 2024-03-02 DIAGNOSIS — I10 HYPERTENSION, UNSPECIFIED TYPE: ICD-10-CM

## 2024-03-04 PROBLEM — G62.9 NEUROPATHY: Status: ACTIVE | Noted: 2023-09-07

## 2024-03-04 PROBLEM — M84.369A STRESS FRACTURE OF TIBIA: Status: ACTIVE | Noted: 2023-03-30

## 2024-03-04 PROBLEM — F41.9 ANXIETY DISORDER, UNSPECIFIED: Status: ACTIVE | Noted: 2023-04-01

## 2024-03-04 PROBLEM — K21.9 GASTRO-ESOPHAGEAL REFLUX DISEASE WITHOUT ESOPHAGITIS: Status: ACTIVE | Noted: 2023-04-01

## 2024-03-04 PROBLEM — M21.069 ACQUIRED GENU VALGUM: Status: ACTIVE | Noted: 2023-11-27

## 2024-03-04 PROBLEM — R32 URINARY INCONTINENCE: Status: ACTIVE | Noted: 2023-09-07

## 2024-03-04 PROBLEM — M20.40 HAMMER TOE: Status: ACTIVE | Noted: 2017-09-28

## 2024-03-04 PROBLEM — J44.9 CHRONIC OBSTRUCTIVE PULMONARY DISEASE (MULTI): Status: ACTIVE | Noted: 2023-04-01

## 2024-03-04 PROBLEM — R26.2 DISABILITY OF WALKING: Status: ACTIVE | Noted: 2023-07-20

## 2024-03-04 PROBLEM — J44.9 CHRONIC OBSTRUCTIVE PULMONARY DISEASE, UNSPECIFIED (MULTI): Status: ACTIVE | Noted: 2023-04-01

## 2024-03-04 PROBLEM — Z86.73 HISTORY OF CEREBROVASCULAR ACCIDENT: Status: ACTIVE | Noted: 2024-03-04

## 2024-03-04 PROBLEM — R22.30 AXILLARY MASS: Status: ACTIVE | Noted: 2023-07-20

## 2024-03-04 PROBLEM — S82.124D: Status: ACTIVE | Noted: 2023-04-01

## 2024-03-04 PROBLEM — M25.562 PAIN IN BOTH KNEES: Status: ACTIVE | Noted: 2023-03-19

## 2024-03-04 PROBLEM — U07.1 COVID-19: Status: ACTIVE | Noted: 2023-05-05

## 2024-03-04 PROBLEM — E78.5 HYPERLIPIDEMIA, UNSPECIFIED: Status: ACTIVE | Noted: 2023-04-01

## 2024-03-04 PROBLEM — M25.561 PAIN IN BOTH KNEES: Status: ACTIVE | Noted: 2023-03-19

## 2024-03-04 PROBLEM — Z28.310 UNVACCINATED FOR COVID-19: Status: ACTIVE | Noted: 2023-04-01

## 2024-03-04 PROBLEM — F43.0 ACUTE STRESS DISORDER: Status: ACTIVE | Noted: 2023-04-05

## 2024-03-04 PROBLEM — M25.551 PAIN OF RIGHT HIP JOINT: Status: ACTIVE | Noted: 2023-07-20

## 2024-03-04 PROBLEM — L29.9 PRURITUS: Status: ACTIVE | Noted: 2023-09-07

## 2024-03-04 PROBLEM — K13.70 ORAL LESION: Status: ACTIVE | Noted: 2023-07-20

## 2024-03-04 PROBLEM — M19.90 UNSPECIFIED OSTEOARTHRITIS, UNSPECIFIED SITE: Status: ACTIVE | Noted: 2023-04-01

## 2024-03-04 PROBLEM — R07.89 ATYPICAL CHEST PAIN: Status: ACTIVE | Noted: 2023-07-20

## 2024-03-04 PROBLEM — Z20.822 CONTACT WITH AND (SUSPECTED) EXPOSURE TO COVID-19: Status: ACTIVE | Noted: 2023-04-01

## 2024-03-04 PROBLEM — E27.9 DISORDER OF ADRENAL GLAND, UNSPECIFIED (MULTI): Status: ACTIVE | Noted: 2023-04-01

## 2024-03-04 PROBLEM — I10 ESSENTIAL (PRIMARY) HYPERTENSION: Status: ACTIVE | Noted: 2023-04-01

## 2024-03-04 PROBLEM — D64.9 ANEMIA, UNSPECIFIED: Status: ACTIVE | Noted: 2023-04-01

## 2024-03-04 PROBLEM — E66.3 OVERWEIGHT WITH BODY MASS INDEX (BMI) 25.0-29.9: Status: ACTIVE | Noted: 2024-03-04

## 2024-03-04 PROBLEM — M17.9 OSTEOARTHRITIS OF KNEE: Status: ACTIVE | Noted: 2023-07-20

## 2024-03-04 PROBLEM — D12.6 ADENOMA OF LARGE INTESTINE: Status: ACTIVE | Noted: 2023-07-20

## 2024-03-04 PROBLEM — L84 CALLUS: Status: ACTIVE | Noted: 2017-09-28

## 2024-03-04 PROBLEM — Z86.59 HISTORY OF DEPRESSION: Status: ACTIVE | Noted: 2023-04-01

## 2024-03-04 PROBLEM — R26.2 DIFFICULTY IN WALKING, NOT ELSEWHERE CLASSIFIED: Status: ACTIVE | Noted: 2023-04-01

## 2024-03-04 PROBLEM — E55.9 VITAMIN D DEFICIENCY, UNSPECIFIED: Status: ACTIVE | Noted: 2023-04-01

## 2024-03-04 PROBLEM — M79.606 PAIN OF LOWER EXTREMITY: Status: ACTIVE | Noted: 2023-07-20

## 2024-03-04 PROBLEM — F32.A DEPRESSION, UNSPECIFIED: Status: ACTIVE | Noted: 2023-04-01

## 2024-03-04 PROBLEM — R06.02 SHORTNESS OF BREATH: Status: ACTIVE | Noted: 2023-07-20

## 2024-03-04 PROBLEM — M62.81 MUSCLE WEAKNESS (GENERALIZED): Status: ACTIVE | Noted: 2023-04-01

## 2024-03-04 NOTE — PROGRESS NOTES
History of Present Illness   Mary Marsh is a 68 y.o. female presenting today for wound check. She is post-op from R tib/fib nonunion surgery with Dr. Linda on 23. Was seen in the office on 1/15 for first post-op check and sutures/staples removed at that time. Notes that about a week later she noticed an opening to the medial aspect of her ankle incision. Has been washing the wound and putting antibiotic ointment on it. Feels that overall the wound has decreased in size, but was concerned as it was still present. Recently started wearing socks and has also developed some redness from this. Has mild pain that is not needing narcotics for control. No drainage. No f/c. No other complaints/concerns.      Past Medical History:   Diagnosis Date    Adrenal nodule (CMS/[facility])     Anemia     Arthritis     Body mass index (BMI) 27.0-27.9, adult     BMI 27.0-27.9,adult    Cataract     Cerebral vascular accident (CMS/[facility])     Closed left hip fracture, initial encounter (CMS/[facility])     s/p IMN    COPD (chronic obstructive pulmonary disease) (CMS/[facility])     Depression     Emphysema of lung (CMS/[facility])     Generalized pruritus     GERD (gastroesophageal reflux disease)     Hypertension     Irritable bowel syndrome     Migraines     Peripheral neuropathy     Right tibial fracture 2023    Urinary incontinence     Vision loss        Medication Documentation Review Audit       Reviewed by Latonya Dunn MA (Medical Assistant) on 24 at 1050      Medication Order Taking? Sig Documenting Provider Last Dose Status   acetaminophen (Tylenol) 325 mg tablet [id number] Yes Take by mouth every 6 hours. Historical Provider, MD Taking Active   amLODIPine (Norvasc) 10 mg tablet [id number] Yes Take 1 tablet (10 mg) by mouth once daily. Aly Qiu MD Taking Active   amLODIPine (Norvasc) 5 mg tablet [id number]  Take 1 tablet (5 mg) by mouth once daily. Anupam Vo DO   10/18/23 6103   benazepril (Lotensin) 40 mg  tablet [id number] Yes Take 1 tablet (40 mg) by mouth once daily. Aly Qiu MD Taking Active   calcium carbonate-vitamin D3 600 mg-5 mcg (200 unit) tablet [id number] Yes Take 1 tablet by mouth once daily. Historical Provider, MD Taking Active   dicyclomine (Bentyl) 10 mg capsule [id number] Yes TAKE 1 CAPSULE (10 MG) BY MOUTH EVERY 6 HOURS IF NEEDED Aly Qiu MD Taking Active   doxycycline (Vibra-Tabs) 100 mg tablet [id number] Yes Take by mouth 2 times a day. Historical Provider, MD Taking Active   ergocalciferol (Vitamin D-2) 1.25 MG (58063 UT) capsule [id number] Yes Take 1 capsule (1,250 mcg) by mouth 1 (one) time per week. Anupam Vo DO Taking Active   fenofibrate (Triglide) 160 mg tablet [id number]  Take 1 tablet (160 mg) by mouth once daily. Aly Qiu MD   24 2359   ferrous sulfate 325 (65 Fe) MG tablet [id number] Yes TAKE 1 TABLET BY MOUTH EVERY DAY WITH FOOD Anupam Vo DO Taking Active   FLUoxetine (PROzac) 20 mg capsule [id number] Yes TAKE 3 CAPSULES BY MOUTH EVERY MORNING AS DIRECTED Historical Provider, MD Taking Active   gabapentin (Neurontin) 100 mg capsule [id number]  Take 1 capsule (100 mg) by mouth once daily at bedtime. Anupam Vo DO   10/18/23 2359   lactase (LACTAID ORAL) [id number] Yes Take by mouth. Historical Provider, MD Taking Active   lamoTRIgine (LaMICtal) 150 mg tablet [id number] Yes Take 1 tablet (150 mg) by mouth once daily. Historical Provider, MD Taking Active   LORazepam (Ativan) 1 mg tablet [id number] Yes Take 1 tablet (1 mg) by mouth 2 times a day. AS DIRECTED Historical Provider, MD Taking Active   mirtazapine (Remeron) 7.5 mg tablet [id number] Yes TAKE 1 TABLET BY MOUTH EVERY DAY AT BEDTIME AS DIRECTED Historical Provider, MD Taking Active   multivitamin with minerals (multivitamin) tablet [id number] Yes Take 1 tablet by mouth once daily. Historical Provider, MD Taking Active   oxyCODONE (Roxicodone) 5 mg immediate release tablet [id number]  Yes Take 1 tablet (5 mg) by mouth every 6 hours if needed for severe pain (7 - 10). Millicent Lang, DO Taking Active   pantoprazole (ProtoNix) 40 mg EC tablet [id number] Yes TAKE 1 TABLET BY MOUTH EVERY DAY Aly Qiu MD Taking Active   potassium chloride CR (K-Tab) 20 mEq ER tablet [id number] Yes Take by mouth. Historical Provider, MD Taking Active   topiramate (Topamax Sprinkle) 25 mg capsule [id number] Yes TAKE 2 CAPSULES (50 MG) BY MOUTH 2 TIMES A DAY. Anupam Vo,  Taking Active                    Allergies   Allergen Reactions    Amoxicillin Swelling and Other     Amoxicillin TABS Comments: Amoxicillin TABS    Egg Unknown    Nitrofurantoin Monohyd/M-Cryst Other     Macrobid CAPS Comments: Macrobid CAPS       Social History     Socioeconomic History    Marital status:      Spouse name: Not on file    Number of children: Not on file    Years of education: Not on file    Highest education level: Not on file   Occupational History    Not on file   Tobacco Use    Smoking status: Former     Types: Cigarettes    Smokeless tobacco: Former   Substance and Sexual Activity    Alcohol use: Never    Drug use: Never    Sexual activity: Defer   Other Topics Concern    Not on file   Social History Narrative    Not on file     Social Determinants of Health     Financial Resource Strain: Low Risk  (12/20/2023)    Overall Financial Resource Strain (CARDIA)     Difficulty of Paying Living Expenses: Not hard at all   Food Insecurity: Not on file   Transportation Needs: No Transportation Needs (2/19/2024)    OASIS : Transportation     Lack of Transportation (Medical): No     Lack of Transportation (Non-Medical): No     Patient Unable or Declines to Respond: No   Physical Activity: Not on file   Stress: Not on file   Social Connections: Feeling Socially Integrated (2/19/2024)    OASIS : Social Isolation     Frequency of experiencing loneliness or isolation: Never   Intimate Partner Violence: Not on file    Housing Stability: Low Risk  (2023)    Housing Stability Vital Sign     Unable to Pay for Housing in the Last Year: No     Number of Places Lived in the Last Year: 1     Unstable Housing in the Last Year: No       Past Surgical History:   Procedure Laterality Date    ANKLE SURGERY      APPENDECTOMY      BREAST BIOPSY       SECTION, LOW TRANSVERSE      CHOLECYSTECTOMY      COLONOSCOPY      KNEE SURGERY      ORIF TIBIA FRACTURE            Review of Systems:  30 point ROS reviewed and negative other than as listed in the HPI     Physical Exam:  Gen: The pt is A&Ox3, NAD, and appear state age and weight  Psychiatric: mood and affect are appropriate   Eyes: sclera are white, EOM grossly intact  ENT: MMM  Neck: supple, thyroid is midline  Respiratory: respirations are nonlabored, chest rise symmetric  CV: rate is regular by palpation of distal pulses  Abdomen: nondistended   Integument: no obvious cutaneous lesions noted. No signs of lymphangitis. No signs of systemic edema.   MSK:  R medial ankle with a dime size superficial wound with pink granulation tissue in the wound bed. There is a small ring of surrounding erythema. No drainage. No increased warmth.   SILT throughout the leg intact. Intact plantarflexion and dorsiflexion. Foot warm and well perfused.      Imaging:  I personally reviewed multiple views of the  R tib/fib and ankle  were obtained in the office today demonstrate maintenance of reduction, interval healing, and a stable position of the hardware.      Assessment   68 y.o. female post-op from R tib/fib nonunion surgery on 23 with delayed healing of medial ankle wound.     Plan:  Continue WBAT on  RLE. We discussed keeping the wound covered with a dry bandage to prevent rubbing over the wound as that can cause delayed healing. She will continue to wash with soap and dry; instructed her to stop putting ointment on the wound. Will give prophylactic abx.  She will follow up in 2  weeks for wound check.     All of the patient's questions/concerns address and they are in agreement with the plan.

## 2024-03-05 ENCOUNTER — OFFICE VISIT (OUTPATIENT)
Dept: PRIMARY CARE | Facility: CLINIC | Age: 69
End: 2024-03-05
Payer: MEDICARE

## 2024-03-05 VITALS — SYSTOLIC BLOOD PRESSURE: 110 MMHG | DIASTOLIC BLOOD PRESSURE: 76 MMHG | BODY MASS INDEX: 24.14 KG/M2 | WEIGHT: 178 LBS

## 2024-03-05 DIAGNOSIS — Z00.00 ROUTINE GENERAL MEDICAL EXAMINATION AT HEALTH CARE FACILITY: Primary | ICD-10-CM

## 2024-03-05 DIAGNOSIS — Z00.00 MEDICARE ANNUAL WELLNESS VISIT, SUBSEQUENT: ICD-10-CM

## 2024-03-05 DIAGNOSIS — I10 HYPERTENSION, UNSPECIFIED TYPE: ICD-10-CM

## 2024-03-05 DIAGNOSIS — Z12.31 ENCOUNTER FOR SCREENING MAMMOGRAM FOR MALIGNANT NEOPLASM OF BREAST: ICD-10-CM

## 2024-03-05 DIAGNOSIS — E27.9 DISORDER OF ADRENAL GLAND, UNSPECIFIED (MULTI): ICD-10-CM

## 2024-03-05 DIAGNOSIS — E87.6 HYPOKALEMIA: ICD-10-CM

## 2024-03-05 DIAGNOSIS — K21.9 GASTROESOPHAGEAL REFLUX DISEASE, UNSPECIFIED WHETHER ESOPHAGITIS PRESENT: ICD-10-CM

## 2024-03-05 DIAGNOSIS — F31.5 BIPOLAR I DISORDER, MOST RECENT EPISODE (OR CURRENT) DEPRESSED, SEVERE, SPECIFIED AS WITH PSYCHOTIC BEHAVIOR (MULTI): ICD-10-CM

## 2024-03-05 PROBLEM — J44.9 CHRONIC OBSTRUCTIVE PULMONARY DISEASE, UNSPECIFIED (MULTI): Status: RESOLVED | Noted: 2023-04-01 | Resolved: 2024-03-05

## 2024-03-05 PROBLEM — J41.0 SIMPLE CHRONIC BRONCHITIS (MULTI): Status: RESOLVED | Noted: 2023-04-05 | Resolved: 2024-03-05

## 2024-03-05 PROBLEM — J44.9 CHRONIC OBSTRUCTIVE PULMONARY DISEASE (MULTI): Status: RESOLVED | Noted: 2023-04-01 | Resolved: 2024-03-05

## 2024-03-05 PROBLEM — J44.9 COPD (CHRONIC OBSTRUCTIVE PULMONARY DISEASE) (MULTI): Status: RESOLVED | Noted: 2023-07-20 | Resolved: 2024-03-05

## 2024-03-05 PROCEDURE — 1125F AMNT PAIN NOTED PAIN PRSNT: CPT | Performed by: STUDENT IN AN ORGANIZED HEALTH CARE EDUCATION/TRAINING PROGRAM

## 2024-03-05 PROCEDURE — 1170F FXNL STATUS ASSESSED: CPT | Performed by: STUDENT IN AN ORGANIZED HEALTH CARE EDUCATION/TRAINING PROGRAM

## 2024-03-05 PROCEDURE — 1159F MED LIST DOCD IN RCRD: CPT | Performed by: STUDENT IN AN ORGANIZED HEALTH CARE EDUCATION/TRAINING PROGRAM

## 2024-03-05 PROCEDURE — 1036F TOBACCO NON-USER: CPT | Performed by: STUDENT IN AN ORGANIZED HEALTH CARE EDUCATION/TRAINING PROGRAM

## 2024-03-05 PROCEDURE — G0439 PPPS, SUBSEQ VISIT: HCPCS | Performed by: STUDENT IN AN ORGANIZED HEALTH CARE EDUCATION/TRAINING PROGRAM

## 2024-03-05 PROCEDURE — 3078F DIAST BP <80 MM HG: CPT | Performed by: STUDENT IN AN ORGANIZED HEALTH CARE EDUCATION/TRAINING PROGRAM

## 2024-03-05 PROCEDURE — 3074F SYST BP LT 130 MM HG: CPT | Performed by: STUDENT IN AN ORGANIZED HEALTH CARE EDUCATION/TRAINING PROGRAM

## 2024-03-05 PROCEDURE — 1160F RVW MEDS BY RX/DR IN RCRD: CPT | Performed by: STUDENT IN AN ORGANIZED HEALTH CARE EDUCATION/TRAINING PROGRAM

## 2024-03-05 PROCEDURE — 99213 OFFICE O/P EST LOW 20 MIN: CPT | Performed by: STUDENT IN AN ORGANIZED HEALTH CARE EDUCATION/TRAINING PROGRAM

## 2024-03-05 RX ORDER — AMLODIPINE BESYLATE 10 MG/1
10 TABLET ORAL DAILY
Qty: 90 TABLET | Refills: 1 | Status: SHIPPED | OUTPATIENT
Start: 2024-03-05 | End: 2024-05-24 | Stop reason: WASHOUT

## 2024-03-05 RX ORDER — AMLODIPINE BESYLATE 10 MG/1
10 TABLET ORAL
Qty: 90 TABLET | Refills: 1 | Status: SHIPPED | OUTPATIENT
Start: 2024-03-05

## 2024-03-05 RX ORDER — PANTOPRAZOLE SODIUM 40 MG/1
40 TABLET, DELAYED RELEASE ORAL DAILY
Qty: 90 TABLET | Refills: 1 | Status: SHIPPED | OUTPATIENT
Start: 2024-03-05

## 2024-03-05 RX ORDER — POTASSIUM CHLORIDE 20 MEQ/1
20 TABLET, EXTENDED RELEASE ORAL DAILY
Qty: 90 TABLET | Refills: 1 | Status: SHIPPED | OUTPATIENT
Start: 2024-03-05

## 2024-03-05 ASSESSMENT — ACTIVITIES OF DAILY LIVING (ADL)
TAKING_MEDICATION: INDEPENDENT
BATHING: INDEPENDENT
GROCERY_SHOPPING: NEEDS ASSISTANCE
DOING_HOUSEWORK: INDEPENDENT
DRESSING: INDEPENDENT
MANAGING_FINANCES: INDEPENDENT

## 2024-03-05 ASSESSMENT — PATIENT HEALTH QUESTIONNAIRE - PHQ9
1. LITTLE INTEREST OR PLEASURE IN DOING THINGS: NOT AT ALL
SUM OF ALL RESPONSES TO PHQ9 QUESTIONS 1 AND 2: 1
2. FEELING DOWN, DEPRESSED OR HOPELESS: SEVERAL DAYS
10. IF YOU CHECKED OFF ANY PROBLEMS, HOW DIFFICULT HAVE THESE PROBLEMS MADE IT FOR YOU TO DO YOUR WORK, TAKE CARE OF THINGS AT HOME, OR GET ALONG WITH OTHER PEOPLE: NOT DIFFICULT AT ALL

## 2024-03-05 NOTE — PROGRESS NOTES
Follow up and med refill and wellness visit and has a painful lump on the right armpit    Subjective   Reason for Visit: Mary Marsh is an 68 y.o. female here for a Medicare Wellness visit.          Reviewed all medications by prescribing practitioner or clinical pharmacist (such as prescriptions, OTCs, herbal therapies and supplements) and documented in the medical record.    HPI    Presents for follow-up, wellness visit. About 1 week ago in her right armpit had noticed an area that was swollen and slightly uncomfortable. Notes something similar happened previously and was due to an ingrown hair. No drainage or discharge.     Patient Care Team:  Anupam Vo DO as PCP - General (Hospitalist)     Review of Systems    8 point review of systems is otherwise negative unless mentioned on HPI      Objective   Vitals:  /76   Wt 80.7 kg (178 lb)   BMI 24.14 kg/m²       Physical Exam    General: No acute distress  HEENT: EOMI  CV: Regular rate and rhythm, normal S1 and S2, no murmurs  Pulm: Clear to auscultation bilaterally, no wheezings, rales or rhonchi  Abd: Nondistended  MSK: 5/5 strength in all extremities  Skin: Right armpit with around 0.8cm area that is raised and slightly erythematous, no drainage or discharge   Lymphatic: No lymphadenopathy      Assessment/Plan   Problem List Items Addressed This Visit       Hypertension     Irritation in right armpit  -Likely from ingrown hair. Considered started antibiotics for concern for early abscess, was recently started on Bactrim for 14 days through orthopaedics which would already be good coverage    R tib/fib nonunion surgery  -Following with ortho for wound checks     Low back pain  -Stable at this time looking into home health care aides would benefit for therapy at home if able to get approved     Weight loss - improved  -Is on mirtazapine, weight stable, reports good appetite  -Extensive workup completed. CT of the A/P did show an nodule and had  dedicated CT of the adrenal and followup with endocrine.      COPD  -Follows with Dr. Bonilla, encourage follow-up as she has not done so     Hypertension  -Continue amlodipine and benazepril     Mental health issues  -Follows with a psychiatrist who provides her fluoxetine, lamotrigine, lorazepam, mirtazapine stable at this time, depression screening done today which was negative     GERD  -Continue omeprazole     Dyspnea  -Recent cardiac workup including stress test  -Due to see pulmonary, started on inhalers although unclear which one, pulm is outside  network      DLD  -Continue fenofibrate     Migraines  -Uses topiramate for prophylaxis, has not had any major issues since being started on it 5 years ago      Health maintenance  -Colonoscopy in 2020, repeat in 2030  -Mammogram ordered  -Encouraged age-appropriate vaccinations     RTC 6 months or sooner as needed     This note was dictated by speech recognition. Minor errors in transcription may be present.

## 2024-03-14 DIAGNOSIS — E55.9 VITAMIN D DEFICIENCY: ICD-10-CM

## 2024-03-14 RX ORDER — ERGOCALCIFEROL 1.25 MG/1
1 CAPSULE ORAL
Qty: 4 CAPSULE | Refills: 0 | Status: SHIPPED | OUTPATIENT
Start: 2024-03-14 | End: 2024-04-03

## 2024-03-18 ENCOUNTER — OFFICE VISIT (OUTPATIENT)
Dept: ORTHOPEDIC SURGERY | Facility: HOSPITAL | Age: 69
End: 2024-03-18
Payer: MEDICARE

## 2024-03-18 ENCOUNTER — DOCUMENTATION (OUTPATIENT)
Dept: HOME HEALTH SERVICES | Facility: HOME HEALTH | Age: 69
End: 2024-03-18

## 2024-03-18 ENCOUNTER — HOSPITAL ENCOUNTER (OUTPATIENT)
Dept: RADIOLOGY | Facility: HOSPITAL | Age: 69
Discharge: HOME | End: 2024-03-18
Payer: MEDICARE

## 2024-03-18 ENCOUNTER — HOME HEALTH ADMISSION (OUTPATIENT)
Dept: HOME HEALTH SERVICES | Facility: HOME HEALTH | Age: 69
End: 2024-03-18
Payer: MEDICARE

## 2024-03-18 DIAGNOSIS — S82.101D CLOSED FRACTURE OF PROXIMAL END OF RIGHT TIBIA WITH ROUTINE HEALING, UNSPECIFIED FRACTURE MORPHOLOGY, SUBSEQUENT ENCOUNTER: ICD-10-CM

## 2024-03-18 DIAGNOSIS — S82.101D CLOSED FRACTURE OF PROXIMAL END OF RIGHT TIBIA WITH ROUTINE HEALING, UNSPECIFIED FRACTURE MORPHOLOGY, SUBSEQUENT ENCOUNTER: Primary | ICD-10-CM

## 2024-03-18 DIAGNOSIS — T81.31XA WOUND DEHISCENCE, SURGICAL, INITIAL ENCOUNTER: ICD-10-CM

## 2024-03-18 PROCEDURE — 1159F MED LIST DOCD IN RCRD: CPT | Performed by: PHYSICIAN ASSISTANT

## 2024-03-18 PROCEDURE — 1036F TOBACCO NON-USER: CPT | Performed by: PHYSICIAN ASSISTANT

## 2024-03-18 PROCEDURE — 73590 X-RAY EXAM OF LOWER LEG: CPT | Mod: RIGHT SIDE | Performed by: RADIOLOGY

## 2024-03-18 PROCEDURE — 73590 X-RAY EXAM OF LOWER LEG: CPT | Mod: RT

## 2024-03-18 PROCEDURE — 1160F RVW MEDS BY RX/DR IN RCRD: CPT | Performed by: PHYSICIAN ASSISTANT

## 2024-03-18 PROCEDURE — 99024 POSTOP FOLLOW-UP VISIT: CPT | Performed by: PHYSICIAN ASSISTANT

## 2024-03-18 NOTE — PROGRESS NOTES
History of Present Illness   Mary Marsh is a 68 y.o. female presenting today for wound check. She is 8 weeks post-op from R tib/fib nonunion surgery with Dr. Linda on 12/20/23. Was seen in the office on 3/1 for first wound check and has been washing the wound and putting dry dressing on it.  Feels that overall the wound has not changed much, she has mild erythema at the skin edges.  She has been taking the Bactrim DS as prescribed 2 weeks ago. No drainage. No f/c. No other complaints/concerns. He son is with her today.      Past Medical History:   Diagnosis Date    Adrenal nodule (CMS/[facility])     Anemia     Arthritis     Body mass index (BMI) 27.0-27.9, adult     BMI 27.0-27.9,adult    Cataract     Cerebral vascular accident (CMS/[facility])     Closed left hip fracture, initial encounter (CMS/[facility])     s/p IMN    COPD (chronic obstructive pulmonary disease) (CMS/[facility])     Depression     Emphysema of lung (CMS/[facility])     Generalized pruritus     GERD (gastroesophageal reflux disease)     Hypertension     Irritable bowel syndrome     Migraines     Peripheral neuropathy     Right tibial fracture 04/2023    Urinary incontinence     Vision loss        Medication Documentation Review Audit       Reviewed by Elva Whittington MA (Medical Assistant) on 03/05/24 at 1557      Medication Order Taking? Sig Documenting Provider Last Dose Status   acetaminophen (Tylenol) 325 mg tablet [id number] Yes Take by mouth every 6 hours. Historical Provider, MD Taking Active   amLODIPine (Norvasc) 10 mg tablet [id number] Yes Take 1 tablet (10 mg) by mouth once daily. Aly Qiu MD Taking Active   amLODIPine (Norvasc) 5 mg tablet [id number] No Take 1 tablet (5 mg) by mouth once daily.   Patient not taking: Reported on 3/5/2024    Anupam Vo, DO Not Taking Active   benazepril (Lotensin) 40 mg tablet [id number] Yes Take 1 tablet (40 mg) by mouth once daily. Aly Qiu MD Taking Active   calcium carbonate-vitamin D3 600 mg-5 mcg (200 unit)  tablet [id number] Yes Take 1 tablet by mouth once daily. Historical Provider, MD Taking Active   dicyclomine (Bentyl) 10 mg capsule [id number] Yes TAKE 1 CAPSULE (10 MG) BY MOUTH EVERY 6 HOURS IF NEEDED Aly Qiu MD Taking Active   doxycycline (Vibra-Tabs) 100 mg tablet [id number] Yes Take by mouth 2 times a day. Historical Provider, MD Taking Active   ergocalciferol (Vitamin D-2) 1.25 MG (83472 UT) capsule [id number] Yes Take 1 capsule (1,250 mcg) by mouth 1 (one) time per week. Anupam Vo DO Taking Active   fenofibrate (Triglide) 160 mg tablet [id number]  Take 1 tablet (160 mg) by mouth once daily. Aly Qiu MD   24 2359   ferrous sulfate 325 (65 Fe) MG tablet [id number] Yes TAKE 1 TABLET BY MOUTH EVERY DAY WITH FOOD Anupam Vo DO Taking Active   FLUoxetine (PROzac) 20 mg capsule [id number] Yes TAKE 3 CAPSULES BY MOUTH EVERY MORNING AS DIRECTED Historical Provider, MD Taking Active   gabapentin (Neurontin) 100 mg capsule [id number]  Take 1 capsule (100 mg) by mouth once daily at bedtime. Anupam Vo DO   10/18/23 2359   lactase (LACTAID ORAL) [id number] Yes Take by mouth. Historical Provider, MD Taking Active   lamoTRIgine (LaMICtal) 150 mg tablet [id number] Yes Take 1 tablet (150 mg) by mouth once daily. Historical Provider, MD Taking Active   LORazepam (Ativan) 1 mg tablet [id number] Yes Take 1 tablet (1 mg) by mouth 2 times a day. AS DIRECTED Historical Provider, MD Taking Active   mirtazapine (Remeron) 7.5 mg tablet [id number] Yes TAKE 1 TABLET BY MOUTH EVERY DAY AT BEDTIME AS DIRECTED Historical Provider, MD Taking Active   multivitamin with minerals (multivitamin) tablet [id number] Yes Take 1 tablet by mouth once daily. Historical Provider, MD Taking Active   oxyCODONE (Roxicodone) 5 mg immediate release tablet [id number] No Take 1 tablet (5 mg) by mouth every 6 hours if needed for severe pain (7 - 10).   Patient not taking: Reported on 3/5/2024    Millicent Lang DO Not  Taking Flag for Review   pantoprazole (ProtoNix) 40 mg EC tablet [id number] Yes TAKE 1 TABLET BY MOUTH EVERY DAY Aly Qiu MD Taking Active   potassium chloride CR (K-Tab) 20 mEq ER tablet [id number] Yes Take by mouth. Historical Provider, MD Taking Active   sulfamethoxazole-trimethoprim (Bactrim DS) 800-160 mg tablet [id number] Yes Take 1 tablet by mouth 2 times a day for 14 days. Emmanuelle Giraldo PA-C Taking Active   topiramate (Topamax Sprinkle) 25 mg capsule [id number] Yes TAKE 2 CAPSULES (50 MG) BY MOUTH 2 TIMES A DAY. Anupam Vo,  Taking Active                       Review of Systems:  30 point ROS reviewed and negative other than as listed in the HPI     Physical Exam:  Gen: The pt is A&Ox3, NAD, and appear state age and weight  Psychiatric: mood and affect are appropriate   Eyes: sclera are white, EOM grossly intact  ENT: MMM  Neck: supple, thyroid is midline  Respiratory: respirations are nonlabored, chest rise symmetric  CV: rate is regular by palpation of distal pulses  Abdomen: nondistended   Integument: no obvious cutaneous lesions noted. No signs of lymphangitis. No signs of systemic edema.   MSK:  R medial ankle with a dime size superficial wound with pink granulation tissue in the wound bed. There is a small ring of surrounding erythema. No drainage. No increased warmth.   SILT throughout the leg intact. Intact plantarflexion and dorsiflexion. Foot warm and well perfused.      Imaging:  I personally reviewed multiple views of the  R tib/fib and ankle  were obtained in the office today demonstrate maintenance of reduction, interval healing, and a stable position of the hardware. There is slight callus formation noted at nonunion.      Assessment   68 y.o. female who is 8/ weeks post-op from R tib/fib nonunion surgery on 12/20/23 with delayed healing of medial ankle wound.     Plan:  Continue WBAT on  RLE. We discussed keeping the wound covered with a dry bandage to prevent rubbing over the  wound as that can cause delayed healing. She will continue to wash with soap and paint with betadine solution, cover with dry dressing.  She may leave it open to air during the day if she is sitting still. I have placed [facility] referral for wound care nurse and PT, hopefully they can come back out for her care as she remains homebound. She will complete her abx as ordered previous visit.  If [facility] denies her, then we will have to enter a referral for her to go to wound care center at [facility]. She will follow up in 2 weeks for another wound check, no x-rays.  All of the patient's questions/concerns address and they are in agreement with the plan.

## 2024-03-18 NOTE — HH CARE COORDINATION
Home Care received a Referral for Nursing and Physical Therapy. We have processed the referral for a Start of Care on 3/19-3/20/24.     If you have any questions or concerns, please feel free to contact us at [phone]. Follow the prompts, enter your five digit zip code, and you will be directed to your care team on [facility].

## 2024-03-19 ENCOUNTER — HOME CARE VISIT (OUTPATIENT)
Dept: HOME HEALTH SERVICES | Facility: HOME HEALTH | Age: 69
End: 2024-03-19
Payer: MEDICARE

## 2024-03-19 VITALS
DIASTOLIC BLOOD PRESSURE: 78 MMHG | SYSTOLIC BLOOD PRESSURE: 122 MMHG | HEART RATE: 61 BPM | TEMPERATURE: 98 F | OXYGEN SATURATION: 99 %

## 2024-03-19 PROCEDURE — 1090000001 HH PPS REVENUE CREDIT

## 2024-03-19 PROCEDURE — 1090000002 HH PPS REVENUE DEBIT

## 2024-03-19 PROCEDURE — 169592 NO-PAY CLAIM PROCEDURE

## 2024-03-19 PROCEDURE — 0023 HH SOC

## 2024-03-19 PROCEDURE — G0299 HHS/HOSPICE OF RN EA 15 MIN: HCPCS | Mod: HHH

## 2024-03-19 ASSESSMENT — ENCOUNTER SYMPTOMS
DENIES PAIN: 1
APPETITE LEVEL: GOOD
PERSON REPORTING PAIN: PATIENT

## 2024-03-19 ASSESSMENT — LIFESTYLE VARIABLES: SMOKING_STATUS: 0

## 2024-03-19 ASSESSMENT — ACTIVITIES OF DAILY LIVING (ADL): ENTERING_EXITING_HOME: DEPENDENT

## 2024-03-19 NOTE — HOME HEALTH
PT HAS NON HEALING WOUND ON RIGHT ANKLE. WILL MEASURE AND TAKE PICTURES ONCE A WEEK. NO PAIN. VSS. MED REC COMPLETE, NO ISSUES. SON IS AROUND OFTEN TO HELP OUT, TAKE HER TO APPTS AND GRAB SCRIPTS. WILL ALSO BE SEEN BY PT.

## 2024-03-20 ENCOUNTER — HOME CARE VISIT (OUTPATIENT)
Dept: HOME HEALTH SERVICES | Facility: HOME HEALTH | Age: 69
End: 2024-03-20
Payer: MEDICARE

## 2024-03-20 PROCEDURE — G0151 HHCP-SERV OF PT,EA 15 MIN: HCPCS | Mod: HHH

## 2024-03-20 PROCEDURE — 1090000002 HH PPS REVENUE DEBIT

## 2024-03-20 PROCEDURE — 1090000001 HH PPS REVENUE CREDIT

## 2024-03-20 ASSESSMENT — ENCOUNTER SYMPTOMS
PAIN LOCATION: RIGHT KNEE
PAIN LOCATION - EXACERBATING FACTORS: WALKING
PAIN LOCATION - PAIN SEVERITY: 4/10
PAIN LOCATION - RELIEVING FACTORS: REST
PERSON REPORTING PAIN: PATIENT
PAIN: 1
HIGHEST PAIN SEVERITY IN PAST 24 HOURS: 9/10
OCCASIONAL FEELINGS OF UNSTEADINESS: 1

## 2024-03-20 NOTE — HOME HEALTH
R tib/fib nonunion surgery with Dr. Linda on 12/20/23. WBAT on RLE per note 3/18. At baseline patient with R knee valgus, R leg length significantly longer than L, and has been using the walker for several years. Plans to have R knee replacement later in 2024. Lives in mobile home with ramp and receives occasional assist from her son. Has not driven in several years.     Patient demonstrated gait with R rearfoot valgus, R knee valgus, short step length bilaterally, incomplete R knee extension, Left hip significantly higher than R which places L hip into adducted position and stresses her L hip abductors, R leg weakness. Did not report any pain in R lower leg from tib/fib surgery. States she does not feel her lower leg is any different than before surgery. Performed supine, seated, and 1 standing exercise as tolerated with R knee pain. Focused on R quad strength and hip abduction strength due to R knee valgus. Patient has guarded potential for significant improvement in mobility due to morphology of RLE. Agreeable to PT 1w1 2w3.

## 2024-03-21 DIAGNOSIS — S82.101D CLOSED FRACTURE OF PROXIMAL END OF RIGHT TIBIA WITH ROUTINE HEALING, UNSPECIFIED FRACTURE MORPHOLOGY, SUBSEQUENT ENCOUNTER: ICD-10-CM

## 2024-03-21 DIAGNOSIS — T81.31XA WOUND DEHISCENCE, SURGICAL, INITIAL ENCOUNTER: ICD-10-CM

## 2024-03-21 PROCEDURE — 1090000002 HH PPS REVENUE DEBIT

## 2024-03-21 PROCEDURE — 1090000001 HH PPS REVENUE CREDIT

## 2024-03-22 ENCOUNTER — APPOINTMENT (OUTPATIENT)
Dept: HOME HEALTH SERVICES | Facility: HOME HEALTH | Age: 69
End: 2024-03-22
Payer: MEDICARE

## 2024-03-22 PROCEDURE — 1090000001 HH PPS REVENUE CREDIT

## 2024-03-22 PROCEDURE — 1090000002 HH PPS REVENUE DEBIT

## 2024-03-22 ASSESSMENT — ACTIVITIES OF DAILY LIVING (ADL): OASIS_M1830: 06

## 2024-03-23 PROCEDURE — 1090000002 HH PPS REVENUE DEBIT

## 2024-03-23 PROCEDURE — 1090000001 HH PPS REVENUE CREDIT

## 2024-03-24 PROCEDURE — 1090000002 HH PPS REVENUE DEBIT

## 2024-03-24 PROCEDURE — 1090000001 HH PPS REVENUE CREDIT

## 2024-03-25 PROCEDURE — 1090000001 HH PPS REVENUE CREDIT

## 2024-03-25 PROCEDURE — 1090000002 HH PPS REVENUE DEBIT

## 2024-03-26 ENCOUNTER — HOME CARE VISIT (OUTPATIENT)
Dept: HOME HEALTH SERVICES | Facility: HOME HEALTH | Age: 69
End: 2024-03-26
Payer: MEDICARE

## 2024-03-26 VITALS
HEART RATE: 62 BPM | DIASTOLIC BLOOD PRESSURE: 70 MMHG | SYSTOLIC BLOOD PRESSURE: 118 MMHG | RESPIRATION RATE: 16 BRPM | TEMPERATURE: 97.3 F | OXYGEN SATURATION: 99 %

## 2024-03-26 PROCEDURE — G0157 HHC PT ASSISTANT EA 15: HCPCS | Mod: HHH

## 2024-03-26 PROCEDURE — 1090000001 HH PPS REVENUE CREDIT

## 2024-03-26 PROCEDURE — G0299 HHS/HOSPICE OF RN EA 15 MIN: HCPCS | Mod: HHH

## 2024-03-26 PROCEDURE — 1090000002 HH PPS REVENUE DEBIT

## 2024-03-26 ASSESSMENT — ENCOUNTER SYMPTOMS
HEADACHES: 1
PERSON REPORTING PAIN: PATIENT
HIGHEST PAIN SEVERITY IN PAST 24 HOURS: 8/10
PAIN: 1
PAIN LOCATION: RIGHT KNEE
LOWER EXTREMITY EDEMA: 1
APPETITE LEVEL: GOOD
LOWEST PAIN SEVERITY IN PAST 24 HOURS: 0/10

## 2024-03-26 NOTE — HOME HEALTH
NO S/S OF INFECTION AT WOUND SITE. COVERED WITH WET TO DRY DRESSING PER NEW ORDER FROM [facility]. CHANGE 3-4X A WEEK, VSS. PAIN REPORTED FROM [facility].

## 2024-03-27 PROCEDURE — 1090000002 HH PPS REVENUE DEBIT

## 2024-03-27 PROCEDURE — 1090000001 HH PPS REVENUE CREDIT

## 2024-03-27 ASSESSMENT — ENCOUNTER SYMPTOMS
PAIN LOCATION: RIGHT KNEE
PAIN SEVERITY GOAL: 0/10
HIGHEST PAIN SEVERITY IN PAST 24 HOURS: 5/10
SUBJECTIVE PAIN PROGRESSION: WAXING AND WANING
PAIN: 1
PERSON REPORTING PAIN: PATIENT
LOWEST PAIN SEVERITY IN PAST 24 HOURS: 0/10

## 2024-03-28 PROCEDURE — 1090000001 HH PPS REVENUE CREDIT

## 2024-03-28 PROCEDURE — 1090000002 HH PPS REVENUE DEBIT

## 2024-03-29 ENCOUNTER — HOME CARE VISIT (OUTPATIENT)
Dept: HOME HEALTH SERVICES | Facility: HOME HEALTH | Age: 69
End: 2024-03-29
Payer: MEDICARE

## 2024-03-29 PROCEDURE — 1090000002 HH PPS REVENUE DEBIT

## 2024-03-29 PROCEDURE — G0299 HHS/HOSPICE OF RN EA 15 MIN: HCPCS | Mod: HHH

## 2024-03-29 PROCEDURE — 1090000001 HH PPS REVENUE CREDIT

## 2024-03-30 PROCEDURE — 1090000002 HH PPS REVENUE DEBIT

## 2024-03-30 PROCEDURE — 1090000001 HH PPS REVENUE CREDIT

## 2024-03-31 VITALS
RESPIRATION RATE: 16 BRPM | DIASTOLIC BLOOD PRESSURE: 70 MMHG | HEART RATE: 64 BPM | SYSTOLIC BLOOD PRESSURE: 116 MMHG | TEMPERATURE: 97.6 F

## 2024-03-31 PROCEDURE — 1090000001 HH PPS REVENUE CREDIT

## 2024-03-31 PROCEDURE — 1090000002 HH PPS REVENUE DEBIT

## 2024-03-31 ASSESSMENT — ENCOUNTER SYMPTOMS
PAIN LOCATION: RIGHT KNEE
HIGHEST PAIN SEVERITY IN PAST 24 HOURS: 8/10
HEADACHES: 1
APPETITE LEVEL: GOOD
LOWEST PAIN SEVERITY IN PAST 24 HOURS: 5/10
PAIN: 1
PERSON REPORTING PAIN: PATIENT

## 2024-03-31 NOTE — HOME HEALTH
CHANGED WET TO DRY DRESSING. WILL REPEAT ON MONDAY. PAIN ONLY IN RIGHT KNEE. VSS. NO OTHER CONCERNS.

## 2024-04-01 ENCOUNTER — HOME CARE VISIT (OUTPATIENT)
Dept: HOME HEALTH SERVICES | Facility: HOME HEALTH | Age: 69
End: 2024-04-01
Payer: MEDICARE

## 2024-04-01 VITALS
RESPIRATION RATE: 18 BRPM | SYSTOLIC BLOOD PRESSURE: 118 MMHG | HEART RATE: 64 BPM | OXYGEN SATURATION: 100 % | TEMPERATURE: 97.4 F | DIASTOLIC BLOOD PRESSURE: 82 MMHG

## 2024-04-01 PROCEDURE — 1090000001 HH PPS REVENUE CREDIT

## 2024-04-01 PROCEDURE — 1090000002 HH PPS REVENUE DEBIT

## 2024-04-01 PROCEDURE — G0299 HHS/HOSPICE OF RN EA 15 MIN: HCPCS | Mod: HHH

## 2024-04-01 ASSESSMENT — ENCOUNTER SYMPTOMS
APPETITE LEVEL: GOOD
PERSON REPORTING PAIN: PATIENT
DENIES PAIN: 1

## 2024-04-01 NOTE — HOME HEALTH
WET TO DRY DRESSING CHANGED TO ANKLE WOUND. WILL REPEAT WEDNESDAY. NO S/S OF INFECTION. VSS. NO PAIN.

## 2024-04-02 ENCOUNTER — HOME CARE VISIT (OUTPATIENT)
Dept: HOME HEALTH SERVICES | Facility: HOME HEALTH | Age: 69
End: 2024-04-02
Payer: MEDICARE

## 2024-04-02 PROCEDURE — 1090000001 HH PPS REVENUE CREDIT

## 2024-04-02 PROCEDURE — 1090000002 HH PPS REVENUE DEBIT

## 2024-04-02 PROCEDURE — G0157 HHC PT ASSISTANT EA 15: HCPCS | Mod: HHH

## 2024-04-02 ASSESSMENT — ENCOUNTER SYMPTOMS
PAIN LOCATION: RIGHT KNEE
HIGHEST PAIN SEVERITY IN PAST 24 HOURS: 4/10
SUBJECTIVE PAIN PROGRESSION: WAXING AND WANING
PERSON REPORTING PAIN: PATIENT
LOWEST PAIN SEVERITY IN PAST 24 HOURS: 0/10
PAIN SEVERITY GOAL: 0/10
PAIN: 1

## 2024-04-03 ENCOUNTER — HOME CARE VISIT (OUTPATIENT)
Dept: HOME HEALTH SERVICES | Facility: HOME HEALTH | Age: 69
End: 2024-04-03
Payer: MEDICARE

## 2024-04-03 VITALS
DIASTOLIC BLOOD PRESSURE: 80 MMHG | RESPIRATION RATE: 16 BRPM | HEART RATE: 61 BPM | OXYGEN SATURATION: 98 % | SYSTOLIC BLOOD PRESSURE: 120 MMHG | TEMPERATURE: 98.6 F

## 2024-04-03 DIAGNOSIS — E55.9 VITAMIN D DEFICIENCY: ICD-10-CM

## 2024-04-03 PROCEDURE — 1090000002 HH PPS REVENUE DEBIT

## 2024-04-03 PROCEDURE — G0299 HHS/HOSPICE OF RN EA 15 MIN: HCPCS | Mod: HHH

## 2024-04-03 PROCEDURE — 1090000001 HH PPS REVENUE CREDIT

## 2024-04-03 RX ORDER — ERGOCALCIFEROL 1.25 MG/1
1 CAPSULE ORAL
Qty: 12 CAPSULE | Refills: 1 | Status: SHIPPED | OUTPATIENT
Start: 2024-04-03

## 2024-04-03 ASSESSMENT — ENCOUNTER SYMPTOMS
PERSON REPORTING PAIN: PATIENT
DENIES PAIN: 1
APPETITE LEVEL: GOOD
CHANGE IN APPETITE: UNCHANGED

## 2024-04-04 ENCOUNTER — HOME CARE VISIT (OUTPATIENT)
Dept: HOME HEALTH SERVICES | Facility: HOME HEALTH | Age: 69
End: 2024-04-04
Payer: MEDICARE

## 2024-04-04 PROCEDURE — 1090000001 HH PPS REVENUE CREDIT

## 2024-04-04 PROCEDURE — 1090000002 HH PPS REVENUE DEBIT

## 2024-04-04 PROCEDURE — G0157 HHC PT ASSISTANT EA 15: HCPCS | Mod: HHH

## 2024-04-04 ASSESSMENT — ENCOUNTER SYMPTOMS
SUBJECTIVE PAIN PROGRESSION: WAXING AND WANING
PERSON REPORTING PAIN: PATIENT
PAIN SEVERITY GOAL: 0/10
HIGHEST PAIN SEVERITY IN PAST 24 HOURS: 3/10
PAIN: 1
LOWEST PAIN SEVERITY IN PAST 24 HOURS: 0/10
PAIN LOCATION: RIGHT KNEE

## 2024-04-05 ENCOUNTER — HOME CARE VISIT (OUTPATIENT)
Dept: HOME HEALTH SERVICES | Facility: HOME HEALTH | Age: 69
End: 2024-04-05
Payer: MEDICARE

## 2024-04-05 VITALS
TEMPERATURE: 98.1 F | SYSTOLIC BLOOD PRESSURE: 118 MMHG | DIASTOLIC BLOOD PRESSURE: 82 MMHG | HEART RATE: 65 BPM | OXYGEN SATURATION: 100 %

## 2024-04-05 PROCEDURE — 1090000002 HH PPS REVENUE DEBIT

## 2024-04-05 PROCEDURE — G0299 HHS/HOSPICE OF RN EA 15 MIN: HCPCS | Mod: HHH

## 2024-04-05 PROCEDURE — 1090000001 HH PPS REVENUE CREDIT

## 2024-04-05 ASSESSMENT — ENCOUNTER SYMPTOMS
PERSON REPORTING PAIN: PATIENT
APPETITE LEVEL: GOOD
LOWER EXTREMITY EDEMA: 1
DENIES PAIN: 1

## 2024-04-05 NOTE — HOME HEALTH
WET TO DRY DRESSING APPLIED. PT TOLERATED WELL. NO PAIN- JUST SORENESS FROM PT. +1 EDEMA IN RT ANKLE- PT AGREED TO ELEVATE AFTER I LEFT. VSS. NO OTHER CONCERNS,

## 2024-04-06 PROCEDURE — 1090000001 HH PPS REVENUE CREDIT

## 2024-04-06 PROCEDURE — 1090000002 HH PPS REVENUE DEBIT

## 2024-04-07 PROCEDURE — 1090000001 HH PPS REVENUE CREDIT

## 2024-04-07 PROCEDURE — 1090000002 HH PPS REVENUE DEBIT

## 2024-04-08 ENCOUNTER — HOME CARE VISIT (OUTPATIENT)
Dept: HOME HEALTH SERVICES | Facility: HOME HEALTH | Age: 69
End: 2024-04-08
Payer: MEDICARE

## 2024-04-08 PROCEDURE — 1090000001 HH PPS REVENUE CREDIT

## 2024-04-08 PROCEDURE — 1090000002 HH PPS REVENUE DEBIT

## 2024-04-08 PROCEDURE — G0151 HHCP-SERV OF PT,EA 15 MIN: HCPCS | Mod: HHH

## 2024-04-08 ASSESSMENT — ENCOUNTER SYMPTOMS
HIGHEST PAIN SEVERITY IN PAST 24 HOURS: 4/10
OCCASIONAL FEELINGS OF UNSTEADINESS: 0
PAIN: 1
PAIN LOCATION: RIGHT KNEE
PERSON REPORTING PAIN: PATIENT
PAIN LOCATION: LEFT HIP
LOWEST PAIN SEVERITY IN PAST 24 HOURS: 0/10

## 2024-04-08 NOTE — HOME HEALTH
DISCHARGE SUMMARY:    DISCIPLINE: Physical Therapy  DATE OF DISCIPLINE DISCHARGE: 4/8/2024  REASON FOR DISCHARGE: CLIENT NO LONGER REQUIRES SKILLED CARE  COORDINATION NOTE:   EVALUATION OF GOALS: Adequate for discharge  SUMMARY OF CARE PROVIDED: HEP, home exit, gait training, exercise modification  DISCHARGE INSTRUCTIONS GIVEN: continue open chain exercises, walking as comfortable  SERVICES REMAINING: SN JCNC OBTAINED:

## 2024-04-09 ENCOUNTER — APPOINTMENT (OUTPATIENT)
Dept: ORTHOPEDIC SURGERY | Facility: HOSPITAL | Age: 69
End: 2024-04-09
Payer: MEDICARE

## 2024-04-09 PROCEDURE — 1090000001 HH PPS REVENUE CREDIT

## 2024-04-09 PROCEDURE — A6252 ABSORPT DRG >16 <=48 W/O BDR: HCPCS | Mod: HHH

## 2024-04-09 PROCEDURE — 1090000002 HH PPS REVENUE DEBIT

## 2024-04-10 ENCOUNTER — HOME CARE VISIT (OUTPATIENT)
Dept: HOME HEALTH SERVICES | Facility: HOME HEALTH | Age: 69
End: 2024-04-10
Payer: MEDICARE

## 2024-04-10 VITALS
SYSTOLIC BLOOD PRESSURE: 122 MMHG | TEMPERATURE: 98.7 F | OXYGEN SATURATION: 95 % | HEART RATE: 65 BPM | DIASTOLIC BLOOD PRESSURE: 78 MMHG

## 2024-04-10 PROCEDURE — 1090000001 HH PPS REVENUE CREDIT

## 2024-04-10 PROCEDURE — 1090000002 HH PPS REVENUE DEBIT

## 2024-04-10 PROCEDURE — G0299 HHS/HOSPICE OF RN EA 15 MIN: HCPCS | Mod: HHH

## 2024-04-10 ASSESSMENT — ENCOUNTER SYMPTOMS
LOWER EXTREMITY EDEMA: 1
DENIES PAIN: 1
PERSON REPORTING PAIN: PATIENT
HEADACHES: 1
APPETITE LEVEL: GOOD

## 2024-04-11 PROCEDURE — 1090000002 HH PPS REVENUE DEBIT

## 2024-04-11 PROCEDURE — 1090000001 HH PPS REVENUE CREDIT

## 2024-04-12 PROCEDURE — 1090000001 HH PPS REVENUE CREDIT

## 2024-04-12 PROCEDURE — 1090000002 HH PPS REVENUE DEBIT

## 2024-04-13 ENCOUNTER — HOME CARE VISIT (OUTPATIENT)
Dept: HOME HEALTH SERVICES | Facility: HOME HEALTH | Age: 69
End: 2024-04-13
Payer: MEDICARE

## 2024-04-13 VITALS
SYSTOLIC BLOOD PRESSURE: 120 MMHG | HEART RATE: 62 BPM | RESPIRATION RATE: 16 BRPM | OXYGEN SATURATION: 99 % | DIASTOLIC BLOOD PRESSURE: 78 MMHG | TEMPERATURE: 97.7 F

## 2024-04-13 PROCEDURE — 1090000002 HH PPS REVENUE DEBIT

## 2024-04-13 PROCEDURE — G0299 HHS/HOSPICE OF RN EA 15 MIN: HCPCS | Mod: HHH

## 2024-04-13 PROCEDURE — 1090000001 HH PPS REVENUE CREDIT

## 2024-04-13 ASSESSMENT — ENCOUNTER SYMPTOMS
APPETITE LEVEL: GOOD
HEADACHES: 1
PERSON REPORTING PAIN: PATIENT
DENIES PAIN: 1

## 2024-04-13 NOTE — HOME HEALTH
VSS. NO PAIN EPORTED. WOUND CARE DONE TO LEFT ANKLE. WOUND IMPROVING GREATLY. WILL CHANGE DRESSING AGAIN ON MONDAY.

## 2024-04-14 PROCEDURE — 1090000002 HH PPS REVENUE DEBIT

## 2024-04-14 PROCEDURE — 1090000001 HH PPS REVENUE CREDIT

## 2024-04-15 ENCOUNTER — HOME CARE VISIT (OUTPATIENT)
Dept: HOME HEALTH SERVICES | Facility: HOME HEALTH | Age: 69
End: 2024-04-15
Payer: MEDICARE

## 2024-04-15 ENCOUNTER — HOSPITAL ENCOUNTER (OUTPATIENT)
Dept: RADIOLOGY | Facility: CLINIC | Age: 69
Discharge: HOME | End: 2024-04-15
Payer: MEDICARE

## 2024-04-15 VITALS
OXYGEN SATURATION: 97 % | HEART RATE: 70 BPM | DIASTOLIC BLOOD PRESSURE: 78 MMHG | SYSTOLIC BLOOD PRESSURE: 116 MMHG | TEMPERATURE: 96.9 F

## 2024-04-15 DIAGNOSIS — Z12.31 ENCOUNTER FOR SCREENING MAMMOGRAM FOR MALIGNANT NEOPLASM OF BREAST: ICD-10-CM

## 2024-04-15 PROCEDURE — 77067 SCR MAMMO BI INCL CAD: CPT

## 2024-04-15 PROCEDURE — 77063 BREAST TOMOSYNTHESIS BI: CPT | Performed by: RADIOLOGY

## 2024-04-15 PROCEDURE — 1090000002 HH PPS REVENUE DEBIT

## 2024-04-15 PROCEDURE — G0299 HHS/HOSPICE OF RN EA 15 MIN: HCPCS | Mod: HHH

## 2024-04-15 PROCEDURE — 77067 SCR MAMMO BI INCL CAD: CPT | Performed by: RADIOLOGY

## 2024-04-15 PROCEDURE — 1090000001 HH PPS REVENUE CREDIT

## 2024-04-16 ENCOUNTER — TELEPHONE (OUTPATIENT)
Dept: PRIMARY CARE | Facility: CLINIC | Age: 69
End: 2024-04-16
Payer: MEDICARE

## 2024-04-16 PROCEDURE — 1090000001 HH PPS REVENUE CREDIT

## 2024-04-16 PROCEDURE — 1090000002 HH PPS REVENUE DEBIT

## 2024-04-16 ASSESSMENT — ENCOUNTER SYMPTOMS
DENIES PAIN: 1
HEADACHES: 1
PERSON REPORTING PAIN: PATIENT
APPETITE LEVEL: GOOD

## 2024-04-16 NOTE — TELEPHONE ENCOUNTER
----- Message from Aly Qiu MD sent at 4/16/2024  8:16 AM EDT -----  Please let Mary know that her mammogram was unchanged from previous. Will continue with yearly screening, thanks

## 2024-04-16 NOTE — HOME HEALTH
NO PAIN. VSS. SLIGHT HEADACHES AT TIMES STILL. WOUND CARE COMPLETE. TOLERATED WELL BY PT. NO S/S OF INFECTION. WOUND ALMOST HEALED

## 2024-04-17 ENCOUNTER — HOME CARE VISIT (OUTPATIENT)
Dept: HOME HEALTH SERVICES | Facility: HOME HEALTH | Age: 69
End: 2024-04-17
Payer: MEDICARE

## 2024-04-17 VITALS
DIASTOLIC BLOOD PRESSURE: 64 MMHG | HEART RATE: 72 BPM | SYSTOLIC BLOOD PRESSURE: 118 MMHG | TEMPERATURE: 98.5 F | OXYGEN SATURATION: 99 %

## 2024-04-17 PROCEDURE — 1090000001 HH PPS REVENUE CREDIT

## 2024-04-17 PROCEDURE — G0299 HHS/HOSPICE OF RN EA 15 MIN: HCPCS | Mod: HHH

## 2024-04-17 PROCEDURE — 1090000002 HH PPS REVENUE DEBIT

## 2024-04-17 ASSESSMENT — ENCOUNTER SYMPTOMS
DENIES PAIN: 1
APPETITE LEVEL: GOOD
PERSON REPORTING PAIN: PATIENT

## 2024-04-17 NOTE — HOME HEALTH
PT SEEN FOR ROUTINE WOUND CARE. WOUND ALMOST COMPLETELY HEALED. GOING TO DISCUSS DC OPTIONS WITH MD ON MONDAY. WILL DO ANOTHER ROUTINE WOUND DRESSING CHANGE FRIDAY AS WELL. NO PAIN. VSS. ENCOURAGED PT TO SEE PCP FOR RECURRING HEADAHCES- BUT SHE BELIEVES THEY ARE JUST STRESS/SINUSES

## 2024-04-18 PROCEDURE — 1090000002 HH PPS REVENUE DEBIT

## 2024-04-18 PROCEDURE — 1090000001 HH PPS REVENUE CREDIT

## 2024-04-19 ENCOUNTER — HOME CARE VISIT (OUTPATIENT)
Dept: HOME HEALTH SERVICES | Facility: HOME HEALTH | Age: 69
End: 2024-04-19
Payer: MEDICARE

## 2024-04-19 VITALS
DIASTOLIC BLOOD PRESSURE: 62 MMHG | TEMPERATURE: 98.5 F | SYSTOLIC BLOOD PRESSURE: 124 MMHG | HEART RATE: 63 BPM | OXYGEN SATURATION: 97 % | RESPIRATION RATE: 16 BRPM

## 2024-04-19 PROCEDURE — 0023 HH SOC

## 2024-04-19 PROCEDURE — 1090000002 HH PPS REVENUE DEBIT

## 2024-04-19 PROCEDURE — G0299 HHS/HOSPICE OF RN EA 15 MIN: HCPCS | Mod: HHH

## 2024-04-19 PROCEDURE — 1090000001 HH PPS REVENUE CREDIT

## 2024-04-19 ASSESSMENT — ENCOUNTER SYMPTOMS
HEADACHES: 1
DENIES PAIN: 1
PERSON REPORTING PAIN: PATIENT
APPETITE LEVEL: GOOD

## 2024-04-19 NOTE — HOME HEALTH
WOUND CARE COMPLETE ACCORDING TO MD ORDER. NO PAIN REPORTED. STILL HAVING HEADACHES. VSS. WILL SEE PT AGAIN ON MONDAY FOR WOUND CARE

## 2024-04-20 PROCEDURE — 1090000002 HH PPS REVENUE DEBIT

## 2024-04-20 PROCEDURE — 1090000001 HH PPS REVENUE CREDIT

## 2024-04-21 PROCEDURE — 1090000002 HH PPS REVENUE DEBIT

## 2024-04-21 PROCEDURE — 1090000001 HH PPS REVENUE CREDIT

## 2024-04-22 ENCOUNTER — HOME CARE VISIT (OUTPATIENT)
Dept: HOME HEALTH SERVICES | Facility: HOME HEALTH | Age: 69
End: 2024-04-22
Payer: MEDICARE

## 2024-04-22 VITALS
RESPIRATION RATE: 16 BRPM | HEART RATE: 50 BPM | OXYGEN SATURATION: 98 % | SYSTOLIC BLOOD PRESSURE: 110 MMHG | DIASTOLIC BLOOD PRESSURE: 64 MMHG | TEMPERATURE: 99.2 F

## 2024-04-22 DIAGNOSIS — Z00.00 ENCOUNTER FOR GENERAL ADULT MEDICAL EXAMINATION WITHOUT ABNORMAL FINDINGS: ICD-10-CM

## 2024-04-22 PROCEDURE — 1090000001 HH PPS REVENUE CREDIT

## 2024-04-22 PROCEDURE — G0299 HHS/HOSPICE OF RN EA 15 MIN: HCPCS | Mod: HHH

## 2024-04-22 PROCEDURE — 1090000002 HH PPS REVENUE DEBIT

## 2024-04-22 ASSESSMENT — ENCOUNTER SYMPTOMS
DENIES PAIN: 1
APPETITE LEVEL: GOOD
PERSON REPORTING PAIN: PATIENT

## 2024-04-22 NOTE — HOME HEALTH
WOUND CARE COMEPLETE. TOLERATED WELL BY PT. NO S/S OF INFECTION. NO PAIN REPORTED. HEADACHES HAVE NOT BEEN AS BOTHERSOME OVER THE WEEKEND DUE TO EXTRA SLEEP. VSS. WILL SEE PT ON WEDNESDAY FOR ROUTINE WOUND CARE.

## 2024-04-23 DIAGNOSIS — Z00.00 ENCOUNTER FOR GENERAL ADULT MEDICAL EXAMINATION WITHOUT ABNORMAL FINDINGS: ICD-10-CM

## 2024-04-23 PROCEDURE — 1090000001 HH PPS REVENUE CREDIT

## 2024-04-23 PROCEDURE — 1090000002 HH PPS REVENUE DEBIT

## 2024-04-23 RX ORDER — TOPIRAMATE SPINKLE 25 MG/1
50 CAPSULE ORAL 2 TIMES DAILY
Qty: 120 CAPSULE | Refills: 0 | Status: SHIPPED | OUTPATIENT
Start: 2024-04-23 | End: 2024-04-23 | Stop reason: SDUPTHER

## 2024-04-24 ENCOUNTER — HOME CARE VISIT (OUTPATIENT)
Dept: HOME HEALTH SERVICES | Facility: HOME HEALTH | Age: 69
End: 2024-04-24
Payer: MEDICARE

## 2024-04-24 VITALS
SYSTOLIC BLOOD PRESSURE: 118 MMHG | HEART RATE: 64 BPM | OXYGEN SATURATION: 99 % | DIASTOLIC BLOOD PRESSURE: 80 MMHG | TEMPERATURE: 98.6 F

## 2024-04-24 DIAGNOSIS — Z74.09 IMPAIRED MOBILITY: Primary | ICD-10-CM

## 2024-04-24 PROCEDURE — G0299 HHS/HOSPICE OF RN EA 15 MIN: HCPCS | Mod: HHH

## 2024-04-24 PROCEDURE — 1090000002 HH PPS REVENUE DEBIT

## 2024-04-24 PROCEDURE — 1090000001 HH PPS REVENUE CREDIT

## 2024-04-24 RX ORDER — TOPIRAMATE SPINKLE 25 MG/1
50 CAPSULE ORAL 2 TIMES DAILY
Qty: 360 CAPSULE | Refills: 0 | Status: SHIPPED | OUTPATIENT
Start: 2024-04-24 | End: 2024-07-23

## 2024-04-24 ASSESSMENT — ENCOUNTER SYMPTOMS
APPETITE LEVEL: GOOD
PERSON REPORTING PAIN: PATIENT
DENIES PAIN: 1

## 2024-04-24 NOTE — HOME HEALTH
PT SEEN FOR ROUTINE WOUND CARE. WOUND CARE COMPLETE ACCORDING TO MD ORDER. PT TOLERATED WELL. NO S/S OF INFECTION. ALEKSANDRA CONT TO DO WOUND CARE FOR 2 WEEKS THAN HAVE MD REASSESS PROGRESS. VSS. NO PAIN. NO OTHER CONCERNS. WILL SEE PT AGAIN ON FRIDAY

## 2024-04-25 PROCEDURE — 1090000002 HH PPS REVENUE DEBIT

## 2024-04-25 PROCEDURE — 1090000001 HH PPS REVENUE CREDIT

## 2024-04-26 ENCOUNTER — HOME CARE VISIT (OUTPATIENT)
Dept: HOME HEALTH SERVICES | Facility: HOME HEALTH | Age: 69
End: 2024-04-26
Payer: MEDICARE

## 2024-04-26 VITALS
TEMPERATURE: 98.8 F | HEART RATE: 80 BPM | SYSTOLIC BLOOD PRESSURE: 110 MMHG | DIASTOLIC BLOOD PRESSURE: 70 MMHG | RESPIRATION RATE: 18 BRPM | OXYGEN SATURATION: 96 %

## 2024-04-26 PROCEDURE — G0299 HHS/HOSPICE OF RN EA 15 MIN: HCPCS | Mod: HHH

## 2024-04-26 PROCEDURE — 1090000001 HH PPS REVENUE CREDIT

## 2024-04-26 PROCEDURE — 1090000002 HH PPS REVENUE DEBIT

## 2024-04-26 ASSESSMENT — ENCOUNTER SYMPTOMS
APPETITE LEVEL: GOOD
PERSON REPORTING PAIN: PATIENT
DENIES PAIN: 1

## 2024-04-26 NOTE — HOME HEALTH
WOUND CARE COMPLETE ACCORDING TO MD ORDER. PT TOLERATED WELL. NO S/S OF INFECTION. PT HAS COLONOSCOPY ON MONDAY. WILL SEE PT FOR WOUND CARE ON TUES WED, AND HOPEFUL FOR A DC ON FRIDAY. VSS. NO PAIN. NO HEADACHES REPORTED TODAY.

## 2024-04-27 PROCEDURE — 1090000001 HH PPS REVENUE CREDIT

## 2024-04-27 PROCEDURE — 1090000002 HH PPS REVENUE DEBIT

## 2024-04-28 PROCEDURE — 1090000001 HH PPS REVENUE CREDIT

## 2024-04-28 PROCEDURE — 1090000002 HH PPS REVENUE DEBIT

## 2024-04-29 PROCEDURE — 1090000001 HH PPS REVENUE CREDIT

## 2024-04-29 PROCEDURE — 1090000002 HH PPS REVENUE DEBIT

## 2024-04-30 ENCOUNTER — HOME CARE VISIT (OUTPATIENT)
Dept: HOME HEALTH SERVICES | Facility: HOME HEALTH | Age: 69
End: 2024-04-30
Payer: MEDICARE

## 2024-04-30 VITALS
RESPIRATION RATE: 18 BRPM | HEART RATE: 60 BPM | TEMPERATURE: 98.9 F | OXYGEN SATURATION: 98 % | DIASTOLIC BLOOD PRESSURE: 64 MMHG | SYSTOLIC BLOOD PRESSURE: 114 MMHG

## 2024-04-30 PROCEDURE — 1090000002 HH PPS REVENUE DEBIT

## 2024-04-30 PROCEDURE — 1090000001 HH PPS REVENUE CREDIT

## 2024-04-30 PROCEDURE — G0299 HHS/HOSPICE OF RN EA 15 MIN: HCPCS | Mod: HHH

## 2024-04-30 ASSESSMENT — ENCOUNTER SYMPTOMS
PERSON REPORTING PAIN: PATIENT
APPETITE LEVEL: GOOD
DENIES PAIN: 1

## 2024-04-30 NOTE — HOME HEALTH
WOUND CARE COMPLETE ACCORDING TO MD ORDER. PT TOLERATED WELL. NO S/S OF INFECTION. NO PAIN. VSS. WILL SEE PT FRIDAY FOR ROUTINE WOUND CARE.

## 2024-05-01 ENCOUNTER — APPOINTMENT (OUTPATIENT)
Dept: HOME HEALTH SERVICES | Facility: HOME HEALTH | Age: 69
End: 2024-05-01
Payer: MEDICARE

## 2024-05-01 PROCEDURE — 1090000002 HH PPS REVENUE DEBIT

## 2024-05-01 PROCEDURE — 1090000001 HH PPS REVENUE CREDIT

## 2024-05-02 PROCEDURE — 1090000001 HH PPS REVENUE CREDIT

## 2024-05-02 PROCEDURE — 1090000002 HH PPS REVENUE DEBIT

## 2024-05-03 ENCOUNTER — HOME CARE VISIT (OUTPATIENT)
Dept: HOME HEALTH SERVICES | Facility: HOME HEALTH | Age: 69
End: 2024-05-03
Payer: MEDICARE

## 2024-05-03 VITALS
TEMPERATURE: 98.9 F | SYSTOLIC BLOOD PRESSURE: 122 MMHG | OXYGEN SATURATION: 98 % | DIASTOLIC BLOOD PRESSURE: 80 MMHG | HEART RATE: 68 BPM

## 2024-05-03 PROCEDURE — 1090000002 HH PPS REVENUE DEBIT

## 2024-05-03 PROCEDURE — 1090000001 HH PPS REVENUE CREDIT

## 2024-05-03 PROCEDURE — G0299 HHS/HOSPICE OF RN EA 15 MIN: HCPCS | Mod: HHH

## 2024-05-03 ASSESSMENT — ENCOUNTER SYMPTOMS
APPETITE LEVEL: GOOD
COUGH CHARACTERISTICS: NON-PRODUCTIVE
PERSON REPORTING PAIN: PATIENT
COUGH: 1
DENIES PAIN: 1

## 2024-05-03 NOTE — HOME HEALTH
WOUND CARE COMPLETE ACCORDING TO MD ORDER. PT TOLERATED WELL. NO S/S OF INFECTION. SENT MD NEW PHOTO OF WOUND TO CONFIRM WE ARE CONT WET TO DRY DRESSING. NO PAIN VSS

## 2024-05-04 PROCEDURE — 1090000002 HH PPS REVENUE DEBIT

## 2024-05-04 PROCEDURE — 1090000001 HH PPS REVENUE CREDIT

## 2024-05-05 PROCEDURE — 1090000002 HH PPS REVENUE DEBIT

## 2024-05-05 PROCEDURE — 1090000001 HH PPS REVENUE CREDIT

## 2024-05-06 PROCEDURE — 1090000002 HH PPS REVENUE DEBIT

## 2024-05-06 PROCEDURE — 1090000001 HH PPS REVENUE CREDIT

## 2024-05-07 ENCOUNTER — HOME CARE VISIT (OUTPATIENT)
Dept: HOME HEALTH SERVICES | Facility: HOME HEALTH | Age: 69
End: 2024-05-07
Payer: MEDICARE

## 2024-05-07 PROCEDURE — 1090000002 HH PPS REVENUE DEBIT

## 2024-05-07 PROCEDURE — 1090000001 HH PPS REVENUE CREDIT

## 2024-05-08 ENCOUNTER — HOME CARE VISIT (OUTPATIENT)
Dept: HOME HEALTH SERVICES | Facility: HOME HEALTH | Age: 69
End: 2024-05-08
Payer: MEDICARE

## 2024-05-08 PROCEDURE — 1090000002 HH PPS REVENUE DEBIT

## 2024-05-08 PROCEDURE — 1090000001 HH PPS REVENUE CREDIT

## 2024-05-09 ENCOUNTER — HOME CARE VISIT (OUTPATIENT)
Dept: HOME HEALTH SERVICES | Facility: HOME HEALTH | Age: 69
End: 2024-05-09
Payer: MEDICARE

## 2024-05-09 VITALS
OXYGEN SATURATION: 97 % | RESPIRATION RATE: 18 BRPM | DIASTOLIC BLOOD PRESSURE: 68 MMHG | SYSTOLIC BLOOD PRESSURE: 110 MMHG | TEMPERATURE: 96.8 F | HEART RATE: 79 BPM

## 2024-05-09 DIAGNOSIS — R79.89 OTHER SPECIFIED ABNORMAL FINDINGS OF BLOOD CHEMISTRY: ICD-10-CM

## 2024-05-09 PROCEDURE — 1090000002 HH PPS REVENUE DEBIT

## 2024-05-09 PROCEDURE — 1090000001 HH PPS REVENUE CREDIT

## 2024-05-09 PROCEDURE — G0299 HHS/HOSPICE OF RN EA 15 MIN: HCPCS | Mod: HHH

## 2024-05-09 RX ORDER — FENOFIBRATE 160 MG/1
160 TABLET ORAL DAILY
Qty: 90 TABLET | Refills: 0 | Status: SHIPPED | OUTPATIENT
Start: 2024-05-09

## 2024-05-09 ASSESSMENT — ENCOUNTER SYMPTOMS
APPETITE LEVEL: GOOD
DENIES PAIN: 1
LOWER EXTREMITY EDEMA: 1
PERSON REPORTING PAIN: PATIENT

## 2024-05-09 NOTE — HOME HEALTH
WOUND HAD DRAINAGE TODAY. USUALLY DOESNT. LOOKS OPENED BACK UP. INFORMED MD. PLACED DRY DRESSING BACK ON WITH MORE PADDING. WAITING ON NEW ORDERS FOR MD. WILL SEE PT AGAIN ON MONDAY. NO PAIN. VSS

## 2024-05-10 PROCEDURE — 1090000002 HH PPS REVENUE DEBIT

## 2024-05-10 PROCEDURE — 1090000001 HH PPS REVENUE CREDIT

## 2024-05-11 PROCEDURE — 1090000001 HH PPS REVENUE CREDIT

## 2024-05-11 PROCEDURE — 1090000002 HH PPS REVENUE DEBIT

## 2024-05-12 PROCEDURE — 1090000002 HH PPS REVENUE DEBIT

## 2024-05-12 PROCEDURE — 1090000001 HH PPS REVENUE CREDIT

## 2024-05-13 ENCOUNTER — HOME CARE VISIT (OUTPATIENT)
Dept: HOME HEALTH SERVICES | Facility: HOME HEALTH | Age: 69
End: 2024-05-13
Payer: MEDICARE

## 2024-05-13 VITALS
RESPIRATION RATE: 18 BRPM | DIASTOLIC BLOOD PRESSURE: 88 MMHG | SYSTOLIC BLOOD PRESSURE: 130 MMHG | OXYGEN SATURATION: 98 % | TEMPERATURE: 97.9 F | HEART RATE: 68 BPM

## 2024-05-13 PROCEDURE — 1090000002 HH PPS REVENUE DEBIT

## 2024-05-13 PROCEDURE — G0299 HHS/HOSPICE OF RN EA 15 MIN: HCPCS | Mod: HHH

## 2024-05-13 PROCEDURE — 1090000001 HH PPS REVENUE CREDIT

## 2024-05-13 ASSESSMENT — ACTIVITIES OF DAILY LIVING (ADL)
OASIS_M1830: 00
HOME_HEALTH_OASIS: 00

## 2024-05-13 ASSESSMENT — ENCOUNTER SYMPTOMS
PAIN: 1
PERSON REPORTING PAIN: PATIENT

## 2024-05-13 NOTE — HOME HEALTH
PT REQUESTS TO BE DC AS SHE IS SWITCHING DOCTORS. IS UNHAPPY WITH CARE FROM ATTNEDING PHYSICIAN. DOES HAVE APPT WITH PCP TO GET NEW REFERALL FOR A NEW MD TO ASSESS WOUND. VSS. NO PAIN. MEDS REVIEWED AND UPDATED. PT DC'ED

## 2024-05-23 ENCOUNTER — APPOINTMENT (OUTPATIENT)
Dept: PRIMARY CARE | Facility: CLINIC | Age: 69
End: 2024-05-23
Payer: MEDICARE

## 2024-05-23 PROBLEM — J44.9 CHRONIC OBSTRUCTIVE PULMONARY DISEASE (MULTI): Status: ACTIVE | Noted: 2024-05-23

## 2024-05-23 PROBLEM — M79.604 RIGHT LEG PAIN: Status: ACTIVE | Noted: 2024-05-23

## 2024-05-23 PROBLEM — Q78.2: Status: ACTIVE | Noted: 2024-05-23

## 2024-05-24 ENCOUNTER — LAB (OUTPATIENT)
Dept: LAB | Facility: LAB | Age: 69
End: 2024-05-24
Payer: MEDICARE

## 2024-05-24 ENCOUNTER — OFFICE VISIT (OUTPATIENT)
Dept: PRIMARY CARE | Facility: CLINIC | Age: 69
End: 2024-05-24
Payer: MEDICARE

## 2024-05-24 ENCOUNTER — APPOINTMENT (OUTPATIENT)
Dept: PRIMARY CARE | Facility: CLINIC | Age: 69
End: 2024-05-24
Payer: MEDICARE

## 2024-05-24 VITALS — BODY MASS INDEX: 25.08 KG/M2 | SYSTOLIC BLOOD PRESSURE: 120 MMHG | WEIGHT: 185 LBS | DIASTOLIC BLOOD PRESSURE: 70 MMHG

## 2024-05-24 DIAGNOSIS — J41.0 SIMPLE CHRONIC BRONCHITIS (MULTI): ICD-10-CM

## 2024-05-24 DIAGNOSIS — Z00.00 HEALTHCARE MAINTENANCE: ICD-10-CM

## 2024-05-24 DIAGNOSIS — S91.001A WOUND OF RIGHT ANKLE, INITIAL ENCOUNTER: Primary | ICD-10-CM

## 2024-05-24 LAB
CHOLEST SERPL-MCNC: 190 MG/DL (ref 0–199)
CHOLESTEROL/HDL RATIO: 3
EST. AVERAGE GLUCOSE BLD GHB EST-MCNC: 103 MG/DL
HBA1C MFR BLD: 5.2 %
HDLC SERPL-MCNC: 62.6 MG/DL
LDLC SERPL CALC-MCNC: 106 MG/DL
NON HDL CHOLESTEROL: 127 MG/DL (ref 0–149)
TRIGL SERPL-MCNC: 106 MG/DL (ref 0–149)
VLDL: 21 MG/DL (ref 0–40)

## 2024-05-24 PROCEDURE — 3074F SYST BP LT 130 MM HG: CPT | Performed by: STUDENT IN AN ORGANIZED HEALTH CARE EDUCATION/TRAINING PROGRAM

## 2024-05-24 PROCEDURE — 3078F DIAST BP <80 MM HG: CPT | Performed by: STUDENT IN AN ORGANIZED HEALTH CARE EDUCATION/TRAINING PROGRAM

## 2024-05-24 PROCEDURE — 99214 OFFICE O/P EST MOD 30 MIN: CPT | Performed by: STUDENT IN AN ORGANIZED HEALTH CARE EDUCATION/TRAINING PROGRAM

## 2024-05-24 PROCEDURE — 1158F ADVNC CARE PLAN TLK DOCD: CPT | Performed by: STUDENT IN AN ORGANIZED HEALTH CARE EDUCATION/TRAINING PROGRAM

## 2024-05-24 PROCEDURE — 1036F TOBACCO NON-USER: CPT | Performed by: STUDENT IN AN ORGANIZED HEALTH CARE EDUCATION/TRAINING PROGRAM

## 2024-05-24 PROCEDURE — 1159F MED LIST DOCD IN RCRD: CPT | Performed by: STUDENT IN AN ORGANIZED HEALTH CARE EDUCATION/TRAINING PROGRAM

## 2024-05-24 PROCEDURE — 1160F RVW MEDS BY RX/DR IN RCRD: CPT | Performed by: STUDENT IN AN ORGANIZED HEALTH CARE EDUCATION/TRAINING PROGRAM

## 2024-05-24 PROCEDURE — 36415 COLL VENOUS BLD VENIPUNCTURE: CPT

## 2024-05-24 PROCEDURE — G2211 COMPLEX E/M VISIT ADD ON: HCPCS | Performed by: STUDENT IN AN ORGANIZED HEALTH CARE EDUCATION/TRAINING PROGRAM

## 2024-05-24 PROCEDURE — 80061 LIPID PANEL: CPT

## 2024-05-24 PROCEDURE — 83036 HEMOGLOBIN GLYCOSYLATED A1C: CPT

## 2024-05-24 PROCEDURE — 1123F ACP DISCUSS/DSCN MKR DOCD: CPT | Performed by: STUDENT IN AN ORGANIZED HEALTH CARE EDUCATION/TRAINING PROGRAM

## 2024-05-24 ASSESSMENT — PATIENT HEALTH QUESTIONNAIRE - PHQ9
SUM OF ALL RESPONSES TO PHQ9 QUESTIONS 1 AND 2: 0
1. LITTLE INTEREST OR PLEASURE IN DOING THINGS: NOT AT ALL
2. FEELING DOWN, DEPRESSED OR HOPELESS: NOT AT ALL

## 2024-05-24 NOTE — PROGRESS NOTES
"Subjective   Patient ID: Mary Marsh is a 68 y.o. female who presents for right ankle not healing right (Since surgery 12/23).    HPI  Patient had right ankle surgery to \"straight her leg\" . Went to SNF. Seen by 2 orthopedic surgeon but states that her ankle is not healing properly.   Denied: fevers, visual changes, chest pain, palpitations, shortness of breath, abdomen pain, urinary symptoms, constipation/diarrhea, bloody stools, falls, and loss of consciousness.  Patient has chronic headaches.       Review of Systems  A 12 point review of systems was performed and all systems were negative/normal except what is noted in the HPI. Please refer to the HPI for details.      Objective   Physical Exam  General: alert and oriented. No acute distress.  HEENT: oral mucosa moist, EOMI.  CHEST: clear breath sounds, no crackles, no wheeze, no tachypnea.  CVS: regular rate and rhythm, systolic murmur 2/6.  ABD: Soft, Non Tender, BS present.  EXT: no edema. Right leg with scars post surgery. Right ankle medial region with small open wound with minimal secretion.  SKIN: no rash.  NEURO: Nonfocal grossly.      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 Bonilla, encourage follow-up as she has not done so  -Patient does not need to use inhalers.     DLD  -Continue fenofibrate  -no recent lipid panel, will order    Folliculitis, right armpit, improved  -Likely from ingrown hair. S/p 14 days of Bactrim.      Low back pain, stable  -uses walker to walk     Weight loss, stable  -on mirtazapine, weight stable, reports good appetite  -Extensive workup completed. CT of the A/P did show a nodule and had dedicated CT of the adrenal and followup with endocrine.      "   Mental health issues  -Follows with a psychiatrist who provides her fluoxetine, lamotrigine, lorazepam, mirtazapine stable at this time, depression screening done today which was negative     GERD  -Continue omeprazole      Migraines  -Uses topiramate for prophylaxis, has not had any major issues since being started on it 5 years ago        Health maintenance  -Colonoscopy in 2024,  repeat in 3-5 years  -Mammogram April/2024  -Encouraged age-appropriate vaccinations     RTC 6 months or sooner as needed     This note was dictated by speech recognition. Minor errors in transcription may be present.             Jazmin Aguirre MD 05/24/24 11:27 AM    Patient's belongings returned

## 2024-05-25 DIAGNOSIS — R10.84 GENERALIZED ABDOMINAL PAIN: ICD-10-CM

## 2024-05-28 RX ORDER — DICYCLOMINE HYDROCHLORIDE 10 MG/1
CAPSULE ORAL
Qty: 360 CAPSULE | Refills: 1 | Status: SHIPPED | OUTPATIENT
Start: 2024-05-28

## 2024-06-07 ENCOUNTER — TELEPHONE (OUTPATIENT)
Dept: PRIMARY CARE | Facility: CLINIC | Age: 69
End: 2024-06-07
Payer: MEDICARE

## 2024-06-07 ENCOUNTER — APPOINTMENT (OUTPATIENT)
Dept: WOUND CARE | Facility: CLINIC | Age: 69
End: 2024-06-07
Payer: MEDICARE

## 2024-06-07 DIAGNOSIS — R41.82 ALTERED MENTAL STATUS, UNSPECIFIED ALTERED MENTAL STATUS TYPE: Primary | ICD-10-CM

## 2024-06-11 NOTE — TELEPHONE ENCOUNTER
Left info on vm that order is in system and to call central scheduling to schedule if no one contacted her yet. There number is [phone]

## 2024-06-13 DIAGNOSIS — E87.6 HYPOKALEMIA: ICD-10-CM

## 2024-06-13 RX ORDER — POTASSIUM CHLORIDE 1500 MG/1
20 TABLET, EXTENDED RELEASE ORAL DAILY
Qty: 90 TABLET | Refills: 1 | Status: SHIPPED | OUTPATIENT
Start: 2024-06-13

## 2024-06-14 ENCOUNTER — OFFICE VISIT (OUTPATIENT)
Dept: WOUND CARE | Facility: CLINIC | Age: 69
End: 2024-06-14
Payer: MEDICARE

## 2024-06-14 PROCEDURE — 11042 DBRDMT SUBQ TIS 1ST 20SQCM/<: CPT

## 2024-06-14 PROCEDURE — 99212 OFFICE O/P EST SF 10 MIN: CPT | Mod: 25

## 2024-06-20 ENCOUNTER — APPOINTMENT (OUTPATIENT)
Dept: RADIOLOGY | Facility: CLINIC | Age: 69
End: 2024-06-20
Payer: MEDICARE

## 2024-06-21 ENCOUNTER — APPOINTMENT (OUTPATIENT)
Dept: WOUND CARE | Facility: CLINIC | Age: 69
End: 2024-06-21
Payer: MEDICARE

## 2024-06-24 ENCOUNTER — OFFICE VISIT (OUTPATIENT)
Dept: WOUND CARE | Facility: CLINIC | Age: 69
End: 2024-06-24
Payer: MEDICARE

## 2024-06-24 ENCOUNTER — HOSPITAL ENCOUNTER (OUTPATIENT)
Dept: RADIOLOGY | Facility: CLINIC | Age: 69
Discharge: HOME | End: 2024-06-24
Payer: MEDICARE

## 2024-06-24 DIAGNOSIS — R41.82 ALTERED MENTAL STATUS, UNSPECIFIED ALTERED MENTAL STATUS TYPE: ICD-10-CM

## 2024-06-24 PROCEDURE — 99203 OFFICE O/P NEW LOW 30 MIN: CPT | Performed by: PODIATRIST

## 2024-06-24 PROCEDURE — 70450 CT HEAD/BRAIN W/O DYE: CPT | Performed by: RADIOLOGY

## 2024-06-24 PROCEDURE — 99213 OFFICE O/P EST LOW 20 MIN: CPT

## 2024-06-24 PROCEDURE — 70450 CT HEAD/BRAIN W/O DYE: CPT

## 2024-07-01 ENCOUNTER — TELEPHONE (OUTPATIENT)
Dept: PRIMARY CARE | Facility: CLINIC | Age: 69
End: 2024-07-01
Payer: COMMERCIAL

## 2024-07-01 NOTE — TELEPHONE ENCOUNTER
Patient is requesting CT results   And is asking if taking benazapril could be causing some of her symptoms, like hallucinations. (Her doctor wanted her to ask you)

## 2024-07-08 ENCOUNTER — OFFICE VISIT (OUTPATIENT)
Dept: WOUND CARE | Facility: CLINIC | Age: 69
End: 2024-07-08
Payer: COMMERCIAL

## 2024-07-08 PROCEDURE — 11042 DBRDMT SUBQ TIS 1ST 20SQCM/<: CPT

## 2024-07-16 ENCOUNTER — APPOINTMENT (OUTPATIENT)
Dept: PRIMARY CARE | Facility: CLINIC | Age: 69
End: 2024-07-16
Payer: COMMERCIAL

## 2024-07-16 ENCOUNTER — LAB (OUTPATIENT)
Dept: LAB | Facility: LAB | Age: 69
End: 2024-07-16
Payer: COMMERCIAL

## 2024-07-16 VITALS
SYSTOLIC BLOOD PRESSURE: 130 MMHG | DIASTOLIC BLOOD PRESSURE: 74 MMHG | BODY MASS INDEX: 25.6 KG/M2 | HEIGHT: 72 IN | WEIGHT: 189 LBS

## 2024-07-16 DIAGNOSIS — R44.3 HALLUCINATIONS: Primary | ICD-10-CM

## 2024-07-16 DIAGNOSIS — Z86.39 HISTORY OF HYPOTHYROIDISM: ICD-10-CM

## 2024-07-16 DIAGNOSIS — R44.3 HALLUCINATIONS: ICD-10-CM

## 2024-07-16 DIAGNOSIS — F41.9 ANXIETY: ICD-10-CM

## 2024-07-16 DIAGNOSIS — R41.82 ALTERED MENTAL STATUS, UNSPECIFIED ALTERED MENTAL STATUS TYPE: ICD-10-CM

## 2024-07-16 LAB
ALBUMIN SERPL BCP-MCNC: 4.6 G/DL (ref 3.4–5)
ALP SERPL-CCNC: 72 U/L (ref 33–136)
ALT SERPL W P-5'-P-CCNC: 15 U/L (ref 7–45)
ANION GAP SERPL CALC-SCNC: 14 MMOL/L (ref 10–20)
APPEARANCE UR: ABNORMAL
AST SERPL W P-5'-P-CCNC: 19 U/L (ref 9–39)
BACTERIA #/AREA URNS AUTO: ABNORMAL /HPF
BILIRUB SERPL-MCNC: 0.3 MG/DL (ref 0–1.2)
BILIRUB UR STRIP.AUTO-MCNC: NEGATIVE MG/DL
BUN SERPL-MCNC: 7 MG/DL (ref 6–23)
CALCIUM SERPL-MCNC: 9.7 MG/DL (ref 8.6–10.6)
CHLORIDE SERPL-SCNC: 102 MMOL/L (ref 98–107)
CO2 SERPL-SCNC: 23 MMOL/L (ref 21–32)
COLOR UR: COLORLESS
CREAT SERPL-MCNC: 0.91 MG/DL (ref 0.5–1.05)
EGFRCR SERPLBLD CKD-EPI 2021: 69 ML/MIN/1.73M*2
ERYTHROCYTE [DISTWIDTH] IN BLOOD BY AUTOMATED COUNT: 14.6 % (ref 11.5–14.5)
GLUCOSE SERPL-MCNC: 91 MG/DL (ref 74–99)
GLUCOSE UR STRIP.AUTO-MCNC: NORMAL MG/DL
HCT VFR BLD AUTO: 36.2 % (ref 36–46)
HGB BLD-MCNC: 11.4 G/DL (ref 12–16)
KETONES UR STRIP.AUTO-MCNC: NEGATIVE MG/DL
LEUKOCYTE ESTERASE UR QL STRIP.AUTO: ABNORMAL
MCH RBC QN AUTO: 26.8 PG (ref 26–34)
MCHC RBC AUTO-ENTMCNC: 31.5 G/DL (ref 32–36)
MCV RBC AUTO: 85 FL (ref 80–100)
MUCOUS THREADS #/AREA URNS AUTO: ABNORMAL /LPF
NITRITE UR QL STRIP.AUTO: NEGATIVE
NRBC BLD-RTO: 0 /100 WBCS (ref 0–0)
PH UR STRIP.AUTO: 6.5 [PH]
PLATELET # BLD AUTO: 371 X10*3/UL (ref 150–450)
POTASSIUM SERPL-SCNC: 4.3 MMOL/L (ref 3.5–5.3)
PROT SERPL-MCNC: 6.7 G/DL (ref 6.4–8.2)
PROT UR STRIP.AUTO-MCNC: NEGATIVE MG/DL
RBC # BLD AUTO: 4.26 X10*6/UL (ref 4–5.2)
RBC # UR STRIP.AUTO: ABNORMAL /UL
RBC #/AREA URNS AUTO: ABNORMAL /HPF
SODIUM SERPL-SCNC: 135 MMOL/L (ref 136–145)
SP GR UR STRIP.AUTO: 1
TSH SERPL-ACNC: 3.15 MIU/L (ref 0.44–3.98)
UROBILINOGEN UR STRIP.AUTO-MCNC: NORMAL MG/DL
VIT B12 SERPL-MCNC: 292 PG/ML (ref 211–911)
WBC # BLD AUTO: 4.8 X10*3/UL (ref 4.4–11.3)
WBC #/AREA URNS AUTO: ABNORMAL /HPF

## 2024-07-16 PROCEDURE — 3075F SYST BP GE 130 - 139MM HG: CPT | Performed by: STUDENT IN AN ORGANIZED HEALTH CARE EDUCATION/TRAINING PROGRAM

## 2024-07-16 PROCEDURE — 85027 COMPLETE CBC AUTOMATED: CPT

## 2024-07-16 PROCEDURE — 82607 VITAMIN B-12: CPT

## 2024-07-16 PROCEDURE — 81001 URINALYSIS AUTO W/SCOPE: CPT

## 2024-07-16 PROCEDURE — 1159F MED LIST DOCD IN RCRD: CPT | Performed by: STUDENT IN AN ORGANIZED HEALTH CARE EDUCATION/TRAINING PROGRAM

## 2024-07-16 PROCEDURE — 36415 COLL VENOUS BLD VENIPUNCTURE: CPT

## 2024-07-16 PROCEDURE — 99214 OFFICE O/P EST MOD 30 MIN: CPT | Performed by: STUDENT IN AN ORGANIZED HEALTH CARE EDUCATION/TRAINING PROGRAM

## 2024-07-16 PROCEDURE — 1036F TOBACCO NON-USER: CPT | Performed by: STUDENT IN AN ORGANIZED HEALTH CARE EDUCATION/TRAINING PROGRAM

## 2024-07-16 PROCEDURE — 84443 ASSAY THYROID STIM HORMONE: CPT

## 2024-07-16 PROCEDURE — 3078F DIAST BP <80 MM HG: CPT | Performed by: STUDENT IN AN ORGANIZED HEALTH CARE EDUCATION/TRAINING PROGRAM

## 2024-07-16 PROCEDURE — 1160F RVW MEDS BY RX/DR IN RCRD: CPT | Performed by: STUDENT IN AN ORGANIZED HEALTH CARE EDUCATION/TRAINING PROGRAM

## 2024-07-16 PROCEDURE — 80053 COMPREHEN METABOLIC PANEL: CPT

## 2024-07-16 RX ORDER — MIRTAZAPINE 30 MG/1
30 TABLET, FILM COATED ORAL NIGHTLY
Qty: 90 TABLET | Refills: 1 | Status: SHIPPED | OUTPATIENT
Start: 2024-07-16 | End: 2024-07-16 | Stop reason: WASHOUT

## 2024-07-16 ASSESSMENT — ENCOUNTER SYMPTOMS
FATIGUE: 1
RESPIRATORY NEGATIVE: 1
GASTROINTESTINAL NEGATIVE: 1
CARDIOVASCULAR NEGATIVE: 1
AGITATION: 1
NERVOUS/ANXIOUS: 1
WEAKNESS: 1
MUSCULOSKELETAL NEGATIVE: 1

## 2024-07-16 NOTE — PROGRESS NOTES
Subjective   Patient ID: Mary Marsh is a 68 y.o. female who presents for Follow-up (Go over test results).    Patient seen on follow-up.  States that she is overall doing well, however was evaluated by her psychiatrist who noted that there are concerns about her mental status.  Did recommend a CT image to further evaluate.  CT imaging was overall unremarkable, however patient regards that most of her symptoms stem from being significantly depressed.  States that she is having worsening anxiety that is compounded as of late.  Otherwise, states no other changes in medications.  Would not want to add any additional medications as she feels like she takes too many.  States no additional issues.         Review of Systems   Constitutional:  Positive for fatigue.   HENT: Negative.     Respiratory: Negative.     Cardiovascular: Negative.    Gastrointestinal: Negative.    Musculoskeletal: Negative.    Neurological:  Positive for weakness.   Psychiatric/Behavioral:  Positive for agitation. The patient is nervous/anxious.        Objective   /74   Ht 1.829 m (6')   Wt 85.7 kg (189 lb)   BMI 25.63 kg/m²     Physical Exam  Constitutional:       General: She is not in acute distress.     Appearance: She is not ill-appearing.      Comments: Ambulates with walker elderly female   Eyes:      Pupils: Pupils are equal, round, and reactive to light.   Cardiovascular:      Rate and Rhythm: Normal rate and regular rhythm.      Pulses: Normal pulses.      Heart sounds: No murmur heard.  Pulmonary:      Effort: No respiratory distress.      Breath sounds: No wheezing.   Abdominal:      General: Abdomen is flat. Bowel sounds are normal. There is no distension.   Musculoskeletal:      Right lower leg: No edema.      Left lower leg: No edema.   Skin:     General: Skin is warm and dry.   Neurological:      Mental Status: She is alert. Mental status is at baseline.      Cranial Nerves: No cranial nerve deficit.      Motor: No  weakness.   Psychiatric:         Mood and Affect: Mood normal.         Behavior: Behavior normal.         Assessment/Plan   Problem List Items Addressed This Visit             ICD-10-CM    Anxiety F41.9    Relevant Orders    Referral to Psychiatry     Other Visit Diagnoses         Codes    Hallucinations    -  Primary R44.3    Relevant Orders    CBC    Comprehensive Metabolic Panel    Urinalysis with Reflex Microscopic    Vitamin B12    TSH with reflex to Free T4 if abnormal    Referral to Psychiatry    Altered mental status, unspecified altered mental status type     R41.82    Relevant Orders    Referral to Psychiatry    History of hypothyroidism     Z86.39    Relevant Orders    TSH with reflex to Free T4 if abnormal            Patient seen on follow-up    #Hallucinations  Concern for underlying hallucinations from psychiatry standpoint, CT imaging overall showing chronic disease no acute changes, recommend lab work today, suspect that hallucinations are secondary to underlying anxiety as depression versus metabolic or neurological pathology  Consider neuroevaluation  Discussed reinitiation of Remeron however she defers encouraged follow-up with psych referral placed     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 Bonilla, encourage follow-up as she has not done so  -Patient does not need to use inhalers.     DLD  -Continue fenofibrate  -no recent lipid panel, will order     Folliculitis, right armpit, improved  -Likely from ingrown hair. S/p 14 days of Bactrim.      Low back pain, stable  -uses walker to walk     Weight loss, stable  -on mirtazapine, weight stable, reports good appetite  -Extensive workup completed. CT of the A/P did show a nodule and had  dedicated CT of the adrenal and followup with endocrine.        Mental health issues  -Follows with a psychiatrist who provides her fluoxetine, lamotrigine, lorazepam, mirtazapine stable at this time, depression screening done today which was negative     GERD  -Continue omeprazole      Migraines  -Uses topiramate for prophylaxis, has not had any major issues since being started on it 5 years ago         Health maintenance  -Colonoscopy in 2024,  repeat in 3-5 years  -Mammogram April/2024  -Encouraged age-appropriate vaccinations     RTC 6 months or sooner as needed

## 2024-07-29 ENCOUNTER — APPOINTMENT (OUTPATIENT)
Dept: WOUND CARE | Facility: CLINIC | Age: 69
End: 2024-07-29
Payer: COMMERCIAL

## 2024-08-09 DIAGNOSIS — R79.89 OTHER SPECIFIED ABNORMAL FINDINGS OF BLOOD CHEMISTRY: ICD-10-CM

## 2024-08-09 RX ORDER — FENOFIBRATE 160 MG/1
160 TABLET ORAL DAILY
Qty: 90 TABLET | Refills: 0 | Status: SHIPPED | OUTPATIENT
Start: 2024-08-09

## 2024-08-11 DIAGNOSIS — Z00.00 ENCOUNTER FOR GENERAL ADULT MEDICAL EXAMINATION WITHOUT ABNORMAL FINDINGS: ICD-10-CM

## 2024-08-12 RX ORDER — TOPIRAMATE SPINKLE 25 MG/1
50 CAPSULE ORAL 2 TIMES DAILY
Qty: 360 CAPSULE | Refills: 0 | Status: SHIPPED | OUTPATIENT
Start: 2024-08-12 | End: 2024-11-10

## 2024-08-18 DIAGNOSIS — I10 HYPERTENSION, UNSPECIFIED TYPE: ICD-10-CM

## 2024-08-19 RX ORDER — BENAZEPRIL HYDROCHLORIDE 40 MG/1
40 TABLET ORAL DAILY
Qty: 90 TABLET | Refills: 0 | Status: SHIPPED | OUTPATIENT
Start: 2024-08-19

## 2024-09-09 DIAGNOSIS — K21.9 GASTROESOPHAGEAL REFLUX DISEASE, UNSPECIFIED WHETHER ESOPHAGITIS PRESENT: ICD-10-CM

## 2024-09-09 DIAGNOSIS — I10 HYPERTENSION, UNSPECIFIED TYPE: ICD-10-CM

## 2024-09-09 RX ORDER — AMLODIPINE BESYLATE 10 MG/1
10 TABLET ORAL DAILY
Qty: 90 TABLET | Refills: 1 | Status: SHIPPED | OUTPATIENT
Start: 2024-09-09

## 2024-09-09 RX ORDER — PANTOPRAZOLE SODIUM 40 MG/1
40 TABLET, DELAYED RELEASE ORAL DAILY
Qty: 90 TABLET | Refills: 1 | Status: SHIPPED | OUTPATIENT
Start: 2024-09-09

## 2024-09-11 DIAGNOSIS — D50.9 IRON DEFICIENCY ANEMIA, UNSPECIFIED IRON DEFICIENCY ANEMIA TYPE: ICD-10-CM

## 2024-09-12 RX ORDER — FERROUS SULFATE 325(65) MG
1 TABLET ORAL DAILY
Qty: 90 TABLET | Refills: 0 | Status: SHIPPED | OUTPATIENT
Start: 2024-09-12

## 2024-10-14 DIAGNOSIS — E55.9 VITAMIN D DEFICIENCY: ICD-10-CM

## 2024-10-14 DIAGNOSIS — R79.89 OTHER SPECIFIED ABNORMAL FINDINGS OF BLOOD CHEMISTRY: ICD-10-CM

## 2024-10-14 DIAGNOSIS — Z00.00 ENCOUNTER FOR GENERAL ADULT MEDICAL EXAMINATION WITHOUT ABNORMAL FINDINGS: ICD-10-CM

## 2024-10-14 RX ORDER — ERGOCALCIFEROL 1.25 MG/1
1 CAPSULE ORAL
Qty: 12 CAPSULE | Refills: 1 | Status: SHIPPED | OUTPATIENT
Start: 2024-10-14

## 2024-10-14 RX ORDER — FENOFIBRATE 160 MG/1
160 TABLET ORAL DAILY
Qty: 90 TABLET | Refills: 0 | Status: SHIPPED | OUTPATIENT
Start: 2024-10-14

## 2024-10-14 RX ORDER — TOPIRAMATE SPINKLE 25 MG/1
50 CAPSULE ORAL 2 TIMES DAILY
Qty: 360 CAPSULE | Refills: 0 | Status: SHIPPED | OUTPATIENT
Start: 2024-10-14 | End: 2025-01-12

## 2024-11-17 DIAGNOSIS — I10 HYPERTENSION, UNSPECIFIED TYPE: ICD-10-CM

## 2024-11-18 RX ORDER — BENAZEPRIL HYDROCHLORIDE 40 MG/1
40 TABLET ORAL DAILY
Qty: 90 TABLET | Refills: 0 | Status: SHIPPED | OUTPATIENT
Start: 2024-11-18

## 2024-12-08 DIAGNOSIS — D50.9 IRON DEFICIENCY ANEMIA, UNSPECIFIED IRON DEFICIENCY ANEMIA TYPE: ICD-10-CM

## 2024-12-09 RX ORDER — FERROUS SULFATE 325(65) MG
1 TABLET ORAL DAILY
Qty: 30 TABLET | Refills: 0 | Status: SHIPPED | OUTPATIENT
Start: 2024-12-09

## 2024-12-11 DIAGNOSIS — Z00.00 ENCOUNTER FOR GENERAL ADULT MEDICAL EXAMINATION WITHOUT ABNORMAL FINDINGS: ICD-10-CM

## 2024-12-11 DIAGNOSIS — I10 HYPERTENSION, UNSPECIFIED TYPE: ICD-10-CM

## 2024-12-11 RX ORDER — BENAZEPRIL HYDROCHLORIDE 40 MG/1
40 TABLET ORAL DAILY
Qty: 30 TABLET | Refills: 0 | Status: SHIPPED | OUTPATIENT
Start: 2024-12-11

## 2024-12-11 RX ORDER — TOPIRAMATE SPINKLE 25 MG/1
50 CAPSULE ORAL 2 TIMES DAILY
Qty: 120 CAPSULE | Refills: 0 | Status: SHIPPED | OUTPATIENT
Start: 2024-12-11 | End: 2025-01-10

## 2025-02-06 DIAGNOSIS — K21.9 GASTROESOPHAGEAL REFLUX DISEASE, UNSPECIFIED WHETHER ESOPHAGITIS PRESENT: ICD-10-CM

## 2025-02-07 RX ORDER — PANTOPRAZOLE SODIUM 40 MG/1
40 TABLET, DELAYED RELEASE ORAL DAILY
Qty: 30 TABLET | Refills: 0 | Status: SHIPPED | OUTPATIENT
Start: 2025-02-07

## 2025-02-28 DIAGNOSIS — J41.0 SIMPLE CHRONIC BRONCHITIS (MULTI): ICD-10-CM

## 2025-02-28 DIAGNOSIS — E55.9 VITAMIN D DEFICIENCY: ICD-10-CM

## 2025-02-28 DIAGNOSIS — I10 HYPERTENSION, UNSPECIFIED TYPE: ICD-10-CM

## 2025-02-28 DIAGNOSIS — D50.9 IRON DEFICIENCY ANEMIA, UNSPECIFIED IRON DEFICIENCY ANEMIA TYPE: ICD-10-CM

## 2025-02-28 DIAGNOSIS — Z00.00 ENCOUNTER FOR GENERAL ADULT MEDICAL EXAMINATION WITHOUT ABNORMAL FINDINGS: ICD-10-CM

## 2025-02-28 DIAGNOSIS — Z00.00 HEALTH CARE MAINTENANCE: Primary | ICD-10-CM

## 2025-02-28 RX ORDER — TOPIRAMATE SPINKLE 25 MG/1
50 CAPSULE ORAL 2 TIMES DAILY
Qty: 120 CAPSULE | Refills: 0 | OUTPATIENT
Start: 2025-02-28 | End: 2025-03-30

## 2025-02-28 RX ORDER — BENAZEPRIL HYDROCHLORIDE 40 MG/1
40 TABLET ORAL DAILY
Qty: 30 TABLET | Refills: 0 | OUTPATIENT
Start: 2025-02-28

## 2025-03-07 DIAGNOSIS — R06.02 SHORTNESS OF BREATH: Primary | ICD-10-CM

## 2025-03-07 RX ORDER — FERROUS SULFATE 325(65) MG
1 TABLET ORAL DAILY
Qty: 30 TABLET | Refills: 0 | Status: SHIPPED | OUTPATIENT
Start: 2025-03-07

## 2025-03-07 RX ORDER — TOPIRAMATE SPINKLE 25 MG/1
50 CAPSULE ORAL 2 TIMES DAILY
Qty: 120 CAPSULE | Refills: 0 | Status: SHIPPED | OUTPATIENT
Start: 2025-03-07 | End: 2025-04-06

## 2025-03-07 RX ORDER — ERGOCALCIFEROL 1.25 MG/1
1 CAPSULE ORAL
Qty: 4 CAPSULE | Refills: 0 | Status: SHIPPED | OUTPATIENT
Start: 2025-03-09

## 2025-03-24 ENCOUNTER — APPOINTMENT (OUTPATIENT)
Dept: CARDIOLOGY | Facility: HOSPITAL | Age: 70
End: 2025-03-24
Payer: MEDICARE

## 2025-03-24 ENCOUNTER — HOSPITAL ENCOUNTER (OUTPATIENT)
Facility: HOSPITAL | Age: 70
Setting detail: OBSERVATION
Discharge: HOME | End: 2025-03-25
Attending: STUDENT IN AN ORGANIZED HEALTH CARE EDUCATION/TRAINING PROGRAM | Admitting: STUDENT IN AN ORGANIZED HEALTH CARE EDUCATION/TRAINING PROGRAM
Payer: MEDICARE

## 2025-03-24 ENCOUNTER — APPOINTMENT (OUTPATIENT)
Dept: RADIOLOGY | Facility: HOSPITAL | Age: 70
End: 2025-03-24
Payer: MEDICARE

## 2025-03-24 DIAGNOSIS — G62.9 NEUROPATHY: ICD-10-CM

## 2025-03-24 DIAGNOSIS — R07.89 OTHER CHEST PAIN: ICD-10-CM

## 2025-03-24 DIAGNOSIS — R07.89 ATYPICAL CHEST PAIN: ICD-10-CM

## 2025-03-24 DIAGNOSIS — R07.9 CHEST PAIN, UNSPECIFIED TYPE: Primary | ICD-10-CM

## 2025-03-24 LAB
ALBUMIN SERPL BCP-MCNC: 4.4 G/DL (ref 3.4–5)
ALP SERPL-CCNC: 88 U/L (ref 33–136)
ALT SERPL W P-5'-P-CCNC: 12 U/L (ref 7–45)
ANION GAP SERPL CALC-SCNC: 13 MMOL/L (ref 10–20)
AORTIC VALVE PEAK VELOCITY: 1.65 M/S
AST SERPL W P-5'-P-CCNC: 18 U/L (ref 9–39)
AV PEAK GRADIENT: 11 MMHG
AVA (PEAK VEL): 3.07 CM2
BASOPHILS # BLD AUTO: 0.03 X10*3/UL (ref 0–0.1)
BASOPHILS NFR BLD AUTO: 0.7 %
BILIRUB SERPL-MCNC: 0.3 MG/DL (ref 0–1.2)
BNP SERPL-MCNC: 43 PG/ML (ref 0–99)
BUN SERPL-MCNC: 13 MG/DL (ref 6–23)
CALCIUM SERPL-MCNC: 9.5 MG/DL (ref 8.6–10.3)
CARDIAC TROPONIN I PNL SERPL HS: 3 NG/L (ref 0–13)
CARDIAC TROPONIN I PNL SERPL HS: 3 NG/L (ref 0–13)
CHLORIDE SERPL-SCNC: 104 MMOL/L (ref 98–107)
CO2 SERPL-SCNC: 21 MMOL/L (ref 21–32)
CREAT SERPL-MCNC: 1.06 MG/DL (ref 0.5–1.05)
EGFRCR SERPLBLD CKD-EPI 2021: 57 ML/MIN/1.73M*2
EJECTION FRACTION APICAL 4 CHAMBER: 72.1
EJECTION FRACTION: 69 %
EOSINOPHIL # BLD AUTO: 0.13 X10*3/UL (ref 0–0.7)
EOSINOPHIL NFR BLD AUTO: 2.9 %
ERYTHROCYTE [DISTWIDTH] IN BLOOD BY AUTOMATED COUNT: 14.5 % (ref 11.5–14.5)
GLUCOSE BLD MANUAL STRIP-MCNC: 83 MG/DL (ref 74–99)
GLUCOSE SERPL-MCNC: 97 MG/DL (ref 74–99)
HCT VFR BLD AUTO: 36.5 % (ref 36–46)
HGB BLD-MCNC: 11.5 G/DL (ref 12–16)
IMM GRANULOCYTES # BLD AUTO: 0.02 X10*3/UL (ref 0–0.7)
IMM GRANULOCYTES NFR BLD AUTO: 0.4 % (ref 0–0.9)
LEFT ATRIUM VOLUME AREA LENGTH INDEX BSA: 18.7 ML/M2
LEFT VENTRICLE INTERNAL DIMENSION DIASTOLE: 3.76 CM (ref 3.5–6)
LEFT VENTRICULAR OUTFLOW TRACT DIAMETER: 2 CM
LYMPHOCYTES # BLD AUTO: 1.53 X10*3/UL (ref 1.2–4.8)
LYMPHOCYTES NFR BLD AUTO: 34.2 %
MAGNESIUM SERPL-MCNC: 1.85 MG/DL (ref 1.6–2.4)
MCH RBC QN AUTO: 26.8 PG (ref 26–34)
MCHC RBC AUTO-ENTMCNC: 31.5 G/DL (ref 32–36)
MCV RBC AUTO: 85 FL (ref 80–100)
MONOCYTES # BLD AUTO: 0.43 X10*3/UL (ref 0.1–1)
MONOCYTES NFR BLD AUTO: 9.6 %
NEUTROPHILS # BLD AUTO: 2.33 X10*3/UL (ref 1.2–7.7)
NEUTROPHILS NFR BLD AUTO: 52.2 %
NRBC BLD-RTO: 0 /100 WBCS (ref 0–0)
PLATELET # BLD AUTO: 342 X10*3/UL (ref 150–450)
POTASSIUM SERPL-SCNC: 4.1 MMOL/L (ref 3.5–5.3)
PROT SERPL-MCNC: 6.8 G/DL (ref 6.4–8.2)
RBC # BLD AUTO: 4.29 X10*6/UL (ref 4–5.2)
RIGHT VENTRICLE FREE WALL PEAK S': 13.1 CM/S
RIGHT VENTRICLE PEAK SYSTOLIC PRESSURE: 24.9 MMHG
SODIUM SERPL-SCNC: 134 MMOL/L (ref 136–145)
TRICUSPID ANNULAR PLANE SYSTOLIC EXCURSION: 2.1 CM
WBC # BLD AUTO: 4.5 X10*3/UL (ref 4.4–11.3)

## 2025-03-24 PROCEDURE — G0378 HOSPITAL OBSERVATION PER HR: HCPCS

## 2025-03-24 PROCEDURE — 36415 COLL VENOUS BLD VENIPUNCTURE: CPT | Performed by: STUDENT IN AN ORGANIZED HEALTH CARE EDUCATION/TRAINING PROGRAM

## 2025-03-24 PROCEDURE — 2500000002 HC RX 250 W HCPCS SELF ADMINISTERED DRUGS (ALT 637 FOR MEDICARE OP, ALT 636 FOR OP/ED)

## 2025-03-24 PROCEDURE — 97161 PT EVAL LOW COMPLEX 20 MIN: CPT | Mod: GP

## 2025-03-24 PROCEDURE — 96372 THER/PROPH/DIAG INJ SC/IM: CPT

## 2025-03-24 PROCEDURE — 83880 ASSAY OF NATRIURETIC PEPTIDE: CPT | Performed by: STUDENT IN AN ORGANIZED HEALTH CARE EDUCATION/TRAINING PROGRAM

## 2025-03-24 PROCEDURE — 99285 EMERGENCY DEPT VISIT HI MDM: CPT | Mod: 25 | Performed by: STUDENT IN AN ORGANIZED HEALTH CARE EDUCATION/TRAINING PROGRAM

## 2025-03-24 PROCEDURE — 99223 1ST HOSP IP/OBS HIGH 75: CPT

## 2025-03-24 PROCEDURE — 93306 TTE W/DOPPLER COMPLETE: CPT

## 2025-03-24 PROCEDURE — 85025 COMPLETE CBC W/AUTO DIFF WBC: CPT | Performed by: STUDENT IN AN ORGANIZED HEALTH CARE EDUCATION/TRAINING PROGRAM

## 2025-03-24 PROCEDURE — 2500000001 HC RX 250 WO HCPCS SELF ADMINISTERED DRUGS (ALT 637 FOR MEDICARE OP): Performed by: STUDENT IN AN ORGANIZED HEALTH CARE EDUCATION/TRAINING PROGRAM

## 2025-03-24 PROCEDURE — 97165 OT EVAL LOW COMPLEX 30 MIN: CPT | Mod: GO

## 2025-03-24 PROCEDURE — 84484 ASSAY OF TROPONIN QUANT: CPT | Performed by: STUDENT IN AN ORGANIZED HEALTH CARE EDUCATION/TRAINING PROGRAM

## 2025-03-24 PROCEDURE — 71045 X-RAY EXAM CHEST 1 VIEW: CPT | Mod: FOREIGN READ | Performed by: RADIOLOGY

## 2025-03-24 PROCEDURE — 93005 ELECTROCARDIOGRAM TRACING: CPT

## 2025-03-24 PROCEDURE — 83735 ASSAY OF MAGNESIUM: CPT | Performed by: STUDENT IN AN ORGANIZED HEALTH CARE EDUCATION/TRAINING PROGRAM

## 2025-03-24 PROCEDURE — 2500000001 HC RX 250 WO HCPCS SELF ADMINISTERED DRUGS (ALT 637 FOR MEDICARE OP)

## 2025-03-24 PROCEDURE — 2500000004 HC RX 250 GENERAL PHARMACY W/ HCPCS (ALT 636 FOR OP/ED)

## 2025-03-24 PROCEDURE — 71045 X-RAY EXAM CHEST 1 VIEW: CPT

## 2025-03-24 PROCEDURE — 80053 COMPREHEN METABOLIC PANEL: CPT | Performed by: STUDENT IN AN ORGANIZED HEALTH CARE EDUCATION/TRAINING PROGRAM

## 2025-03-24 PROCEDURE — 93306 TTE W/DOPPLER COMPLETE: CPT | Performed by: INTERNAL MEDICINE

## 2025-03-24 PROCEDURE — 82947 ASSAY GLUCOSE BLOOD QUANT: CPT | Mod: 59

## 2025-03-24 RX ORDER — LORAZEPAM 0.5 MG/1
1 TABLET ORAL 2 TIMES DAILY
Status: DISCONTINUED | OUTPATIENT
Start: 2025-03-24 | End: 2025-03-25 | Stop reason: HOSPADM

## 2025-03-24 RX ORDER — PANTOPRAZOLE SODIUM 40 MG/1
40 TABLET, DELAYED RELEASE ORAL DAILY
Status: DISCONTINUED | OUTPATIENT
Start: 2025-03-24 | End: 2025-03-25 | Stop reason: HOSPADM

## 2025-03-24 RX ORDER — DEXTROSE 50 % IN WATER (D50W) INTRAVENOUS SYRINGE
25
Status: DISCONTINUED | OUTPATIENT
Start: 2025-03-24 | End: 2025-03-25 | Stop reason: HOSPADM

## 2025-03-24 RX ORDER — TOPIRAMATE 25 MG/1
50 TABLET ORAL 2 TIMES DAILY
Status: DISCONTINUED | OUTPATIENT
Start: 2025-03-24 | End: 2025-03-25 | Stop reason: HOSPADM

## 2025-03-24 RX ORDER — DEXTROSE 50 % IN WATER (D50W) INTRAVENOUS SYRINGE
12.5
Status: DISCONTINUED | OUTPATIENT
Start: 2025-03-24 | End: 2025-03-25 | Stop reason: HOSPADM

## 2025-03-24 RX ORDER — DICYCLOMINE HYDROCHLORIDE 20 MG/1
20 TABLET ORAL 4 TIMES DAILY PRN
Status: DISCONTINUED | OUTPATIENT
Start: 2025-03-24 | End: 2025-03-25 | Stop reason: HOSPADM

## 2025-03-24 RX ORDER — NITROGLYCERIN 0.4 MG/1
0.4 TABLET SUBLINGUAL EVERY 5 MIN PRN
Status: DISCONTINUED | OUTPATIENT
Start: 2025-03-24 | End: 2025-03-24

## 2025-03-24 RX ORDER — FENOFIBRATE 160 MG/1
160 TABLET ORAL DAILY
Status: DISCONTINUED | OUTPATIENT
Start: 2025-03-24 | End: 2025-03-25 | Stop reason: HOSPADM

## 2025-03-24 RX ORDER — ACETAMINOPHEN 325 MG/1
650 TABLET ORAL EVERY 4 HOURS PRN
Status: DISCONTINUED | OUTPATIENT
Start: 2025-03-24 | End: 2025-03-25 | Stop reason: HOSPADM

## 2025-03-24 RX ORDER — LAMOTRIGINE 100 MG/1
150 TABLET ORAL NIGHTLY
Status: DISCONTINUED | OUTPATIENT
Start: 2025-03-24 | End: 2025-03-25 | Stop reason: HOSPADM

## 2025-03-24 RX ORDER — POTASSIUM CHLORIDE 20 MEQ/1
20 TABLET, EXTENDED RELEASE ORAL DAILY
Status: DISCONTINUED | OUTPATIENT
Start: 2025-03-24 | End: 2025-03-25 | Stop reason: HOSPADM

## 2025-03-24 RX ORDER — AMLODIPINE BESYLATE 10 MG/1
10 TABLET ORAL DAILY
Status: DISCONTINUED | OUTPATIENT
Start: 2025-03-24 | End: 2025-03-25 | Stop reason: HOSPADM

## 2025-03-24 RX ORDER — FLUOXETINE HYDROCHLORIDE 20 MG/1
40 CAPSULE ORAL DAILY
Status: DISCONTINUED | OUTPATIENT
Start: 2025-03-24 | End: 2025-03-25 | Stop reason: HOSPADM

## 2025-03-24 RX ORDER — POLYETHYLENE GLYCOL 3350 17 G/17G
17 POWDER, FOR SOLUTION ORAL DAILY
Status: DISCONTINUED | OUTPATIENT
Start: 2025-03-24 | End: 2025-03-25 | Stop reason: HOSPADM

## 2025-03-24 RX ORDER — MIRTAZAPINE 15 MG/1
7.5 TABLET, FILM COATED ORAL NIGHTLY
Status: DISCONTINUED | OUTPATIENT
Start: 2025-03-24 | End: 2025-03-25 | Stop reason: HOSPADM

## 2025-03-24 RX ORDER — NITROGLYCERIN 0.4 MG/1
0.4 TABLET SUBLINGUAL EVERY 5 MIN PRN
Status: DISCONTINUED | OUTPATIENT
Start: 2025-03-24 | End: 2025-03-25 | Stop reason: HOSPADM

## 2025-03-24 RX ORDER — GUAIFENESIN/DEXTROMETHORPHAN 100-10MG/5
5 SYRUP ORAL EVERY 4 HOURS PRN
Status: DISCONTINUED | OUTPATIENT
Start: 2025-03-24 | End: 2025-03-25 | Stop reason: HOSPADM

## 2025-03-24 RX ORDER — NAPROXEN SODIUM 220 MG/1
162 TABLET, FILM COATED ORAL ONCE
Status: DISCONTINUED | OUTPATIENT
Start: 2025-03-24 | End: 2025-03-24

## 2025-03-24 RX ORDER — PSYLLIUM HUSK 0.4 G
1 CAPSULE ORAL
Status: DISCONTINUED | OUTPATIENT
Start: 2025-03-24 | End: 2025-03-25 | Stop reason: HOSPADM

## 2025-03-24 RX ORDER — ONDANSETRON 4 MG/1
4 TABLET, FILM COATED ORAL EVERY 8 HOURS PRN
Status: DISCONTINUED | OUTPATIENT
Start: 2025-03-24 | End: 2025-03-25 | Stop reason: HOSPADM

## 2025-03-24 RX ORDER — HEPARIN SODIUM 5000 [USP'U]/ML
5000 INJECTION, SOLUTION INTRAVENOUS; SUBCUTANEOUS EVERY 8 HOURS SCHEDULED
Status: DISCONTINUED | OUTPATIENT
Start: 2025-03-24 | End: 2025-03-25 | Stop reason: HOSPADM

## 2025-03-24 RX ORDER — ASPIRIN 81 MG/1
81 TABLET ORAL DAILY
COMMUNITY

## 2025-03-24 RX ORDER — ASPIRIN 81 MG/1
81 TABLET ORAL DAILY
Status: DISCONTINUED | OUTPATIENT
Start: 2025-03-24 | End: 2025-03-25 | Stop reason: HOSPADM

## 2025-03-24 RX ORDER — FERROUS SULFATE 325(65) MG
1 TABLET ORAL DAILY
Status: DISCONTINUED | OUTPATIENT
Start: 2025-03-24 | End: 2025-03-25 | Stop reason: HOSPADM

## 2025-03-24 RX ORDER — TALC
3 POWDER (GRAM) TOPICAL NIGHTLY PRN
Status: DISCONTINUED | OUTPATIENT
Start: 2025-03-24 | End: 2025-03-25 | Stop reason: HOSPADM

## 2025-03-24 RX ORDER — MIRTAZAPINE 7.5 MG/1
7.5 TABLET, FILM COATED ORAL NIGHTLY
COMMUNITY

## 2025-03-24 RX ORDER — ONDANSETRON HYDROCHLORIDE 2 MG/ML
4 INJECTION, SOLUTION INTRAVENOUS EVERY 8 HOURS PRN
Status: DISCONTINUED | OUTPATIENT
Start: 2025-03-24 | End: 2025-03-25 | Stop reason: HOSPADM

## 2025-03-24 RX ADMIN — LORAZEPAM 1 MG: 0.5 TABLET ORAL at 13:27

## 2025-03-24 RX ADMIN — DICYCLOMINE HYDROCHLORIDE 20 MG: 20 TABLET ORAL at 20:20

## 2025-03-24 RX ADMIN — LORAZEPAM 1 MG: 0.5 TABLET ORAL at 20:20

## 2025-03-24 RX ADMIN — LAMOTRIGINE 150 MG: 100 TABLET ORAL at 20:19

## 2025-03-24 RX ADMIN — FERROUS SULFATE TAB 325 MG (65 MG ELEMENTAL FE) 325 MG: 325 (65 FE) TAB at 13:28

## 2025-03-24 RX ADMIN — FENOFIBRATE 160 MG: 160 TABLET ORAL at 13:29

## 2025-03-24 RX ADMIN — TOPIRAMATE 50 MG: 25 TABLET, FILM COATED ORAL at 13:28

## 2025-03-24 RX ADMIN — ASPIRIN 81 MG: 81 TABLET, COATED ORAL at 13:28

## 2025-03-24 RX ADMIN — PANTOPRAZOLE SODIUM 40 MG: 40 TABLET, DELAYED RELEASE ORAL at 13:28

## 2025-03-24 RX ADMIN — HEPARIN SODIUM 5000 UNITS: 5000 INJECTION INTRAVENOUS; SUBCUTANEOUS at 13:29

## 2025-03-24 RX ADMIN — Medication 1 TABLET: at 13:28

## 2025-03-24 RX ADMIN — FLUOXETINE HYDROCHLORIDE 40 MG: 20 CAPSULE ORAL at 13:28

## 2025-03-24 RX ADMIN — HEPARIN SODIUM 5000 UNITS: 5000 INJECTION INTRAVENOUS; SUBCUTANEOUS at 09:14

## 2025-03-24 RX ADMIN — AMLODIPINE BESYLATE 10 MG: 10 TABLET ORAL at 13:28

## 2025-03-24 RX ADMIN — MIRTAZAPINE 7.5 MG: 15 TABLET, FILM COATED ORAL at 20:19

## 2025-03-24 RX ADMIN — POTASSIUM CHLORIDE 20 MEQ: 1500 TABLET, EXTENDED RELEASE ORAL at 13:28

## 2025-03-24 RX ADMIN — HEPARIN SODIUM 5000 UNITS: 5000 INJECTION INTRAVENOUS; SUBCUTANEOUS at 22:43

## 2025-03-24 RX ADMIN — TOPIRAMATE 50 MG: 25 TABLET, FILM COATED ORAL at 20:20

## 2025-03-24 SDOH — SOCIAL STABILITY: SOCIAL INSECURITY: ARE YOU OR HAVE YOU BEEN THREATENED OR ABUSED PHYSICALLY, EMOTIONALLY, OR SEXUALLY BY ANYONE?: NO

## 2025-03-24 SDOH — ECONOMIC STABILITY: INCOME INSECURITY: IN THE PAST 12 MONTHS HAS THE ELECTRIC, GAS, OIL, OR WATER COMPANY THREATENED TO SHUT OFF SERVICES IN YOUR HOME?: NO

## 2025-03-24 SDOH — ECONOMIC STABILITY: HOUSING INSECURITY: IN THE LAST 12 MONTHS, WAS THERE A TIME WHEN YOU WERE NOT ABLE TO PAY THE MORTGAGE OR RENT ON TIME?: NO

## 2025-03-24 SDOH — ECONOMIC STABILITY: FOOD INSECURITY: HOW HARD IS IT FOR YOU TO PAY FOR THE VERY BASICS LIKE FOOD, HOUSING, MEDICAL CARE, AND HEATING?: NOT VERY HARD

## 2025-03-24 SDOH — ECONOMIC STABILITY: FOOD INSECURITY: WITHIN THE PAST 12 MONTHS, YOU WORRIED THAT YOUR FOOD WOULD RUN OUT BEFORE YOU GOT THE MONEY TO BUY MORE.: NEVER TRUE

## 2025-03-24 SDOH — ECONOMIC STABILITY: FOOD INSECURITY: WITHIN THE PAST 12 MONTHS, THE FOOD YOU BOUGHT JUST DIDN'T LAST AND YOU DIDN'T HAVE MONEY TO GET MORE.: NEVER TRUE

## 2025-03-24 SDOH — SOCIAL STABILITY: SOCIAL INSECURITY: HAS ANYONE EVER THREATENED TO HURT YOUR FAMILY OR YOUR PETS?: NO

## 2025-03-24 SDOH — SOCIAL STABILITY: SOCIAL INSECURITY: WITHIN THE LAST YEAR, HAVE YOU BEEN HUMILIATED OR EMOTIONALLY ABUSED IN OTHER WAYS BY YOUR PARTNER OR EX-PARTNER?: NO

## 2025-03-24 SDOH — SOCIAL STABILITY: SOCIAL INSECURITY
WITHIN THE LAST YEAR, HAVE YOU BEEN RAPED OR FORCED TO HAVE ANY KIND OF SEXUAL ACTIVITY BY YOUR PARTNER OR EX-PARTNER?: NO

## 2025-03-24 SDOH — ECONOMIC STABILITY: HOUSING INSECURITY: AT ANY TIME IN THE PAST 12 MONTHS, WERE YOU HOMELESS OR LIVING IN A SHELTER (INCLUDING NOW)?: NO

## 2025-03-24 SDOH — SOCIAL STABILITY: SOCIAL INSECURITY: ARE THERE ANY APPARENT SIGNS OF INJURIES/BEHAVIORS THAT COULD BE RELATED TO ABUSE/NEGLECT?: NO

## 2025-03-24 SDOH — SOCIAL STABILITY: SOCIAL INSECURITY
WITHIN THE LAST YEAR, HAVE YOU BEEN KICKED, HIT, SLAPPED, OR OTHERWISE PHYSICALLY HURT BY YOUR PARTNER OR EX-PARTNER?: NO

## 2025-03-24 SDOH — SOCIAL STABILITY: SOCIAL INSECURITY: DO YOU FEEL ANYONE HAS EXPLOITED OR TAKEN ADVANTAGE OF YOU FINANCIALLY OR OF YOUR PERSONAL PROPERTY?: NO

## 2025-03-24 SDOH — SOCIAL STABILITY: SOCIAL INSECURITY: WERE YOU ABLE TO COMPLETE ALL THE BEHAVIORAL HEALTH SCREENINGS?: YES

## 2025-03-24 SDOH — ECONOMIC STABILITY: TRANSPORTATION INSECURITY: IN THE PAST 12 MONTHS, HAS LACK OF TRANSPORTATION KEPT YOU FROM MEDICAL APPOINTMENTS OR FROM GETTING MEDICATIONS?: NO

## 2025-03-24 SDOH — SOCIAL STABILITY: SOCIAL INSECURITY: WITHIN THE LAST YEAR, HAVE YOU BEEN AFRAID OF YOUR PARTNER OR EX-PARTNER?: NO

## 2025-03-24 SDOH — SOCIAL STABILITY: SOCIAL INSECURITY: DOES ANYONE TRY TO KEEP YOU FROM HAVING/CONTACTING OTHER FRIENDS OR DOING THINGS OUTSIDE YOUR HOME?: NO

## 2025-03-24 SDOH — ECONOMIC STABILITY: HOUSING INSECURITY: IN THE PAST 12 MONTHS, HOW MANY TIMES HAVE YOU MOVED WHERE YOU WERE LIVING?: 1

## 2025-03-24 SDOH — SOCIAL STABILITY: SOCIAL INSECURITY: DO YOU FEEL UNSAFE GOING BACK TO THE PLACE WHERE YOU ARE LIVING?: NO

## 2025-03-24 SDOH — SOCIAL STABILITY: SOCIAL INSECURITY: HAVE YOU HAD ANY THOUGHTS OF HARMING ANYONE ELSE?: NO

## 2025-03-24 SDOH — SOCIAL STABILITY: SOCIAL INSECURITY: HAVE YOU HAD THOUGHTS OF HARMING ANYONE ELSE?: NO

## 2025-03-24 SDOH — SOCIAL STABILITY: SOCIAL INSECURITY: ABUSE: ADULT

## 2025-03-24 ASSESSMENT — ACTIVITIES OF DAILY LIVING (ADL)
GROOMING: INDEPENDENT
BATHING_ASSISTANCE: STAND BY
PATIENT'S MEMORY ADEQUATE TO SAFELY COMPLETE DAILY ACTIVITIES?: YES
WALKS IN HOME: NEEDS ASSISTANCE
DRESSING YOURSELF: INDEPENDENT
ADEQUATE_TO_COMPLETE_ADL: YES
ASSISTIVE_DEVICE: WALKER
LACK_OF_TRANSPORTATION: NO
LACK_OF_TRANSPORTATION: NO
TOILETING: NEEDS ASSISTANCE
HEARING - LEFT EAR: FUNCTIONAL
FEEDING YOURSELF: INDEPENDENT
ADL_ASSISTANCE: INDEPENDENT
BATHING: NEEDS ASSISTANCE
JUDGMENT_ADEQUATE_SAFELY_COMPLETE_DAILY_ACTIVITIES: YES
ADL_ASSISTANCE: INDEPENDENT
HEARING - RIGHT EAR: FUNCTIONAL

## 2025-03-24 ASSESSMENT — COLUMBIA-SUICIDE SEVERITY RATING SCALE - C-SSRS
6. HAVE YOU EVER DONE ANYTHING, STARTED TO DO ANYTHING, OR PREPARED TO DO ANYTHING TO END YOUR LIFE?: NO
1. IN THE PAST MONTH, HAVE YOU WISHED YOU WERE DEAD OR WISHED YOU COULD GO TO SLEEP AND NOT WAKE UP?: NO
2. HAVE YOU ACTUALLY HAD ANY THOUGHTS OF KILLING YOURSELF?: NO
1. IN THE PAST MONTH, HAVE YOU WISHED YOU WERE DEAD OR WISHED YOU COULD GO TO SLEEP AND NOT WAKE UP?: NO
6. HAVE YOU EVER DONE ANYTHING, STARTED TO DO ANYTHING, OR PREPARED TO DO ANYTHING TO END YOUR LIFE?: NO
2. HAVE YOU ACTUALLY HAD ANY THOUGHTS OF KILLING YOURSELF?: NO

## 2025-03-24 ASSESSMENT — LIFESTYLE VARIABLES
HOW MANY STANDARD DRINKS CONTAINING ALCOHOL DO YOU HAVE ON A TYPICAL DAY: PATIENT DOES NOT DRINK
HOW OFTEN DO YOU HAVE A DRINK CONTAINING ALCOHOL: NEVER
SUBSTANCE_ABUSE_PAST_12_MONTHS: NO
HOW OFTEN DO YOU HAVE 6 OR MORE DRINKS ON ONE OCCASION: NEVER
SKIP TO QUESTIONS 9-10: 1
AUDIT-C TOTAL SCORE: 0
AUDIT-C TOTAL SCORE: 0
PRESCIPTION_ABUSE_PAST_12_MONTHS: NO

## 2025-03-24 ASSESSMENT — COGNITIVE AND FUNCTIONAL STATUS - GENERAL
DAILY ACTIVITIY SCORE: 23
MOBILITY SCORE: 23
CLIMB 3 TO 5 STEPS WITH RAILING: A LITTLE
TOILETING: A LITTLE
HELP NEEDED FOR BATHING: A LITTLE
DRESSING REGULAR LOWER BODY CLOTHING: A LITTLE
DAILY ACTIVITIY SCORE: 21
WALKING IN HOSPITAL ROOM: A LITTLE
MOVING TO AND FROM BED TO CHAIR: A LITTLE
PATIENT BASELINE BEDBOUND: NO
STANDING UP FROM CHAIR USING ARMS: A LITTLE
CLIMB 3 TO 5 STEPS WITH RAILING: A LITTLE
DAILY ACTIVITIY SCORE: 21
TOILETING: A LITTLE
MOBILITY SCORE: 20
WALKING IN HOSPITAL ROOM: A LITTLE
HELP NEEDED FOR BATHING: A LITTLE
HELP NEEDED FOR BATHING: A LITTLE
MOVING TO AND FROM BED TO CHAIR: A LITTLE
DRESSING REGULAR LOWER BODY CLOTHING: A LITTLE
CLIMB 3 TO 5 STEPS WITH RAILING: A LITTLE
STANDING UP FROM CHAIR USING ARMS: A LITTLE
MOBILITY SCORE: 20

## 2025-03-24 ASSESSMENT — HEART SCORE
AGE: 65+
RISK FACTORS: >2 RISK FACTORS OR HX OF ATHEROSCLEROTIC DISEASE
HEART SCORE: 6
ECG: NON-SPECIFIC REPOLARIZATION DISTURBANCE
HISTORY: MODERATELY SUSPICIOUS
TROPONIN: LESS THAN OR EQUAL TO NORMAL LIMIT

## 2025-03-24 ASSESSMENT — PAIN SCALES - GENERAL
PAINLEVEL_OUTOF10: 0 - NO PAIN
PAINLEVEL_OUTOF10: 9
PAINLEVEL_OUTOF10: 3
PAINLEVEL_OUTOF10: 9

## 2025-03-24 ASSESSMENT — PAIN - FUNCTIONAL ASSESSMENT
PAIN_FUNCTIONAL_ASSESSMENT: 0-10
PAIN_FUNCTIONAL_ASSESSMENT: 0-10

## 2025-03-24 NOTE — ED TRIAGE NOTES
Pt comes into via [facility] from home. Pt states mid sternal chest pain. Pt states non radiant. Described as a heaviness. Pt states its been going on for weeks but has gotten worse. Pt states heaviness and sob when laying flat. Pt has hx of anxiety. A/O/4 during triage

## 2025-03-24 NOTE — PROGRESS NOTES
Physical Therapy    Physical Therapy Evaluation    Patient Name: Mary Marsh  MRN: [id number]  Today's Date: 3/24/2025   Time Calculation  Start Time: 1150  Stop Time: 1205  Time Calculation (min): 15 min  314/314-A    Assessment/Plan   PT Assessment  Rehab Prognosis: Good  Barriers to Discharge Home: No anticipated barriers  Evaluation/Treatment Tolerance: Patient tolerated treatment well  Strengths: Ability to acquire knowledge, Capable of completing ADLs semi/independent  End of Session Communication: Bedside nurse  Assessment Comment: Pt admitted 3/24 with ongoing chest pain for a few weeks and SOB. Pt demos all mobility independently with use of FWW and has chronic gait deficits. Per son and patient she is currently at baseline LOF with mobility. No further acute PT needs.  End of Session Patient Position: Bed, 3 rail up  IP OR SWING BED PT PLAN  Inpatient or Swing Bed: Inpatient  PT Plan  PT Plan: PT Eval only  PT Eval Only Reason: At baseline function  PT Frequency: PT eval only  PT Discharge Recommendations: No further acute PT  PT - OK to Discharge: Yes    Subjective     Current Problem:  1. Chest pain, unspecified type        2. Atypical chest pain  Transthoracic Echo (TTE) Complete    Transthoracic Echo (TTE) Complete        Patient Active Problem List   Diagnosis    Closed fracture of proximal end of right tibia with routine healing    Fall    Depression    Anxiety    Pain    Abdominal pain    Adenoma of colon    Adrenal adenoma    Ambulatory dysfunction    Anemia    Bony sclerosis (HHS-HCC)    Chest pain, atypical    Cough    Diarrhea following gastrointestinal surgery    Dyspnea    Early satiety    Folliculitis    High serum cholestanol    Hip pain, right    Hypertension    Hypokalemia    Knee pain    Leg pain    Lumbar radiculopathy    Mass of right axilla    Mouth sore    Primary localized osteoarthritis of knee    Stress reaction    Stress fracture of right tibia    Stress reaction of tibia     Tibia fracture    Traumatic arthropathy of ankle    Vitamin D deficiency    Weight loss    Bipolar I disorder, most recent episode (or current) depressed, severe, specified as with psychotic behavior (Multi)    Hammertoe of right foot    Hyperlipidemia    Lumbosacral spondylosis without myelopathy    Migraine    Pre-ulcerative calluses    Traumatic arthritis of left hip    Tobacco use disorder    Trochanteric bursitis    Genu valgum, acquired, unspecified laterality    Traumatic closed displaced fracture of shaft of tibia    Traumatic closed displaced fracture of shaft of right tibia    Gastroesophageal reflux disease    Post-traumatic arthritis of right ankle    Closed displaced oblique fracture of shaft of right tibia with malunion, subsequent encounter    Contact with and (suspected) exposure to covid-19    COVID-19    Disorder of adrenal gland, unspecified    History of cerebrovascular accident    History of depression    Muscle weakness (generalized)    Neuropathy    Nondisplaced fracture of lateral condyle of right tibia, subsequent encounter for closed fracture with routine healing    Overweight with body mass index (BMI) 25.0-29.9    Pain in both knees    Pruritus    Unvaccinated for covid-19    Urinary incontinence    Adenoma of large intestine    Difficulty in walking, not elsewhere classified    Disability of walking    Anemia, unspecified    Acute stress disorder    Anxiety disorder, unspecified    Atypical chest pain    Depression, unspecified    Depressive disorder    Shortness of breath    Gastro-esophageal reflux disease without esophagitis    Acquired genu valgum    Hammer toe    Hyperlipidemia, unspecified    Pain of right hip joint    Essential (primary) hypertension    Pain of lower extremity    Spondylosis of lumbosacral spine without myelopathy    Axillary mass    Migraine headache    Oral lesion    Traumatic arthritis of ankle    Callus    Osteoarthritis of knee    Unspecified osteoarthritis,  unspecified site    Stress fracture of tibia    Bursitis of hip    Vitamin D deficiency, unspecified    Chronic obstructive pulmonary disease (Multi)    Osteosclerosis (HHS-HCC)    Right leg pain    Chest pain, unspecified type     General Visit Information:  General  Reason for Referral: PT eval and treat; impaired mobility  Referred By: Daxa Prince MD  Past Medical History Relevant to Rehab: Admitted 3/24 with worsening chest pain. Cardiology consulted and EKG with no acute findings. (PMH: pt with hx of cva, anxiety, L hip nailing, ORIF tibia fx in 2015)  Family/Caregiver Present: Yes  Caregiver Feedback: son present  Co-Treatment: OT  Co-Treatment Reason: to maximize patient safety and participation  Prior to Session Communication: Bedside nurse  Patient Position Received: Bed, 3 rail up  General Comment: Son assisting with pt compliance to PT evaluation. Pt easily agitated initially and able to be redirected    Home Living:  Home Living  Type of Home:  (Pt lives home alone in trailer with ramp for entrance into the home.  Bed/bath one floor, and pt has walk in shower with shower chair and  and [facility].  Pt has life alert and higher toilet set with GB.)    Prior Level of Function:  Prior Function Per Pt/Caregiver Report  Level of [place]: Independent with ADLs and functional transfers  Receives Help From: Family  ADL Assistance: Independent  Homemaking Assistance: Needs assistance  Meal Prep:  (family orders groceries for patient and has senior meals delivered as well.)  Ambulatory Assistance:  (pt uses rollator in home and community)  Transfers: Independent  Prior Function Comments: No reported falls    Precautions:  Precautions  Medical Precautions: Fall precautions     Objective     Pain:  Pain Assessment  Pain Assessment: 0-10  0-10 (Numeric) Pain Score: 9  Pain Type: Acute pain  Pain Location: Chest    Cognition:  Cognition  Overall Cognitive Status: Within Functional Limits    General Assessments:       Activity Tolerance  Endurance: Endurance does not limit participation in activity  Sensation  Light Touch: No apparent deficits  Strength  Strength Comments: BLE WFL; chronic deficits, however at least 3/5     Functional Assessments:     Bed Mobility  Bed Mobility:  (completed supine <> sit with MOD I. Denies dizziness.)  Transfers  Transfer:  (from EOB to FWW with MOD I and demos correct hand placement. Denies dizziness.)  Ambulation/Gait Training  Ambulation/Gait Training Performed:  (Pt completed ~60'x1 with use of FWW and demos chronic deficits with R knee genu valgum and L hip hike and R hip drop. Pt denies dizziness. Mild SOB with Spo2 WFL on RA.)     Extremity/Trunk Assessments:  RLE   RLE : Within Functional Limits  LLE   LLE : Within Functional Limits    Outcome Measures:     [facility] Basic Mobility  Turning from your back to your side while in a flat bed without using bedrails: None  Moving from lying on your back to sitting on the side of a flat bed without using bedrails: None  Moving to and from bed to chair (including a wheelchair): None  Standing up from a chair using your arms (e.g. wheelchair or bedside chair): None  To walk in hospital room: None  Climbing 3-5 steps with railing: A little  Basic Mobility - Total Score: 23    Goals:  Eval only     Education Documentation  Body Mechanics, taught by Tamiko Chance PT at 3/24/2025  4:18 PM.  Learner: Family, Patient  Readiness: Acceptance  Method: Explanation  Response: Verbalizes Understanding    Mobility Training, taught by Tamiko Chance PT at 3/24/2025  4:18 PM.  Learner: Family, Patient  Readiness: Acceptance  Method: Explanation  Response: Verbalizes Understanding    Education Comments  No comments found.

## 2025-03-24 NOTE — H&P
History Of Present Illness  Mary Marsh is a 69 y.o. female with a past medical history of COPD, hypertension, dyslipidemia, migraine, GERD, presented to the ED from home via EMS due to chief complaints of midsternal chest pain.  She took aspirin prior to arriving to the ED.  Patient says her symptoms have been going on intermittently since 2 to 3 weeks.  She notices her chest pain and shortness of breath are worse when laying flat and also while walking.  However she does experience the symptoms at rest as well like during television time.  She also endorses shortness of breath progressively worsening.  She has a history of GERD, however takes her pantoprazole and has not had any reflux/heartburn symptoms.  She has not had any recent travel or any recent surgeries.  She does not endorse any pleuritic symptoms like worsening on inspiration or relief while laying forward.  She has a history of IBS, usually has diarrhea, has not had any worsening of her symptoms.  She denies any symptoms like fever or chills.  She denies any recent changes in weight.    ED course:  Vitals: 129/81, SpO2 95%, pulse 70, afebrile  EKG: Incomplete RBBB, no ST elevations, nonspecific T wave abnormality, rate 69  CXR, no evidence of pleural effusion, focal consolidation or pneumothorax  Troponin: 3, repeat 3, BNP: 43, magnesium 1.85, no significant electrolyte abnormalities, creatinine 1.06  No leukocytosis, mild anemia hemoglobin 11.5    Past Medical History  She has a past medical history of Adrenal nodule (Multi), Anemia, Arthritis, Body mass index (BMI) 27.0-27.9, adult, Cataract, Cerebral vascular accident (Multi), Closed left hip fracture, initial encounter (Multi), COPD (chronic obstructive pulmonary disease) (Multi), Depression, Emphysema of lung (Multi), Generalized pruritus, GERD (gastroesophageal reflux disease), Hypertension, Irritable bowel syndrome, Migraines, Peripheral neuropathy, Right tibial fracture (04/2023),  Urinary incontinence, and Vision loss.    Surgical History  She has a past surgical history that includes Appendectomy;  section, low transverse; Cholecystectomy; Colonoscopy (); Knee surgery; Ankle surgery; Breast biopsy; and ORIF tibia fracture ().     Social History  She reports that she has quit smoking. Her smoking use included cigarettes. She has quit using smokeless tobacco. She reports that she does not drink alcohol and does not use drugs.    Family History  Family History   Problem Relation Name Age of Onset    Diabetes Mother      Hypertension Mother      Lung cancer Father          tobacco use    Cancer Sister      Lung cancer Brother          tobacco use    Breast cancer Mother's Sister          Allergies  Amoxicillin, Egg, Lactose, and Nitrofurantoin monohyd/m-cryst    Review of Systems  Normal unless mentioned above  Physical Exam  HENT:      Head: Normocephalic.   Eyes:      Conjunctiva/sclera: Conjunctivae normal.   Cardiovascular:      Rate and Rhythm: Normal rate.   Pulmonary:      Effort: Pulmonary effort is normal.      Breath sounds: Normal breath sounds.   Abdominal:      Palpations: Abdomen is soft.   Musculoskeletal:         General: Normal range of motion.      Cervical back: Normal range of motion.   Skin:     General: Skin is warm.   Neurological:      General: No focal deficit present.      Mental Status: She is alert.   Psychiatric:         Mood and Affect: Mood normal.             Last Recorded Vitals  /71 (BP Location: Right arm, Patient Position: Sitting)   Pulse 67   Temp 36.8 °C (98.2 °F)   Resp 17   Wt 90.7 kg (200 lb)   SpO2 98%       Assessment/Plan   Mary Marsh is a 69 y.o. female with a past medical history of COPD, hypertension, dyslipidemia, migraine, GERD, presented to the ED from home via EMS due to chief complaints of midsternal chest pain.    Acute medical conditions:  #ACS rule out  #Atypical chest pain  :: Troponin on admission 3,  repeat 3.  EKG did not show any acute ischemic ischemic changes  :: Patient meets only 1 characteristic of typical chest pain, that is location: Mid sternal, however given she is an elderly woman she, angina could certainly be presenting with atypical features like shortness of breath which she has  :: Last echo from 2019 LVEF 60 to 65% with slightly elevated RSVP, nuclear stress test from 2019 showed normal stress response  -We have consulted cardiology appreciate their recommendations  -We have ordered an echocardiogram to evaluate for any ischemic changes  -We will continue home aspirin  -We have ordered sublingual nitroglycerin as needed for chest pain up to 3 doses every 5 minutes with notification to provider    Chronic medical conditions:  #Hypertension  #Dyslipidemia  #Migraine  #GERD  #COPD  #Anxiety disorder  #Bipolar disorder  #Chronic normocytic anemia  -Continue amlodipine for hypertension, holding home benzapril due to mild bump in creatinine  -Continue home fluoxetine, and lorazepam for anxiety, continue lamotrigine for bipolar disorder  -Continue mirtazapine for appetite stimulation  -Continue daily potassium for chronic hypokalemia  -Continue pantoprazole for chronic GERD  -Continue home multivitamins    Diet: Regular  Consults: Cardiology  DVT prophylaxis: Heparin subcu  CODE STATUS: Full code    Daxa Prince MD  PGY1 IM

## 2025-03-24 NOTE — HOSPITAL COURSE
Mary Marsh is a 69 y.o. female with a past medical history of COPD, hypertension, dyslipidemia, migraine, GERD, presented to the ED from home via EMS due to chief complaints of midsternal chest pain, occurring intermittently since 2 to 3 weeks.  In the ED she was hemodynamically stable, EKG showed incomplete right bundle branch block,no ST elevations, nonspecific T wave abnormality, rate 69.  CXR did not show any evidence of pneumothorax, pleural effusion, focal consolidation.  Troponin resulted 3 and repeat was also 3.  BNP 43.  No significant electrolyte abnormalities were noted in the ED workup.  There was no leukocytosis mild anemia was noted hemoglobin of 11.5.  Patient was admitted under general medicine service for cardiology evaluation, since she is an elderly female presenting with midsternal chest pain.  An echocardiogram was ordered, showed an EF of 69%, grade 1 impaired relaxation pattern of LV diastolic filling with normal left atrial filling pressure, normal size right ventricle and right ventricular systolic pressure was also within limits.  Cardiology was consulted, they recommended patient have a Lexiscan stress test in the outpatient setting.  Per cardiology, patient is medically stable for discharge.  Patient has been asked to also follow-up with her primary care provider for posthospitalization follow-up.

## 2025-03-24 NOTE — PROGRESS NOTES
"Occupational Therapy    Evaluation    Patient Name: Mary Marsh  MRN: [id number]  Today's Date: 3/24/2025  Time Calculation  Start Time: 1151  Stop Time: 1205  Time Calculation (min): 14 min  CDU03/CDU03    Assessment  IP OT Assessment  OT Assessment: pt showing baseline for lb dressing, transfers and functional mobilty. pt and son report no concerns for home going, no dme needs and pt at baseline for function except CP. O2 stats appeared to range withing high 80- to mid 90's during activity without addtional 02.  Barriers to Discharge Home: No anticipated barriers  Evaluation/Treatment Tolerance: Patient tolerated treatment well  Medical Staff Made Aware: Yes  End of Session Communication: Bedside nurse  End of Session Patient Position: Bed, 4 rail up    Plan:  No Skilled OT: At baseline function  OT Frequency: OT eval only  OT Discharge Recommendations: No further acute OT  OT Recommended Transfer Status: Independent  OT - OK to Discharge: Yes    Subjective     Current Problem:  1. Chest pain, unspecified type        2. Atypical chest pain  Transthoracic Echo (TTE) Complete    Transthoracic Echo (TTE) Complete          General:  General  Reason for Referral: ot to eval and treat for adls and safety assessment due to onset of chest pain for weeks.  Referred By: Lazarus  Past Medical History Relevant to Rehab: pt with hx of cva, anxiety, L hip nailing, ORIF tibia fx in 2015 (EKG showing no acute findings, cardiology consulted.)  Co-Treatment: PT  Co-Treatment Reason: maximize pt safety and pt compliance/participation due to anxiety  Prior to Session Communication: Bedside nurse  Patient Position Received: Bed, 4 rail up  General Comment: son present in room.  Assisted with helping pt to compliance to particpate in OT eval.  Pt was initially fearful of doing eval -\"I dont want to go to the NH- they're just trying to get me back there\". Im not going anywhere but home.\"    Pain:  Pain Assessment  Pain Assessment: " 0-10  0-10 (Numeric) Pain Score: 9  Pain Type: Acute pain  Pain Location: Chest    Objective     Cognition:  Overall Cognitive Status: Within Functional Limits     Home Living:  Type of Home:  (Pt lives home alone in trailer with ramp for entrance into the home.  Bed/bath one floor, and pt has walk in shower with shower chair and GB and hhs.  Pt has life alert and higher toilet set with GB.)     Prior Function:  Level of [place]: Independent with ADLs and functional transfers  Receives Help From: Family  ADL Assistance: Independent  Homemaking Assistance: Needs assistance  Meal Prep:  (family orders groceries and assists with driving to appointments.)  Ambulatory Assistance: Needs assistance (pt uses rollator and walker in the community and in the house.)  Transfers: Independent  Hand Dominance: Right    IADL History:  IADL Comments: pt reports she does her own light cooking and cleaning.    ADL:  Eating Assistance: Independent  Grooming Assistance: Stand by  Bathing Assistance: Stand by  UE Dressing Assistance: Independent  LE Dressing Assistance: Independent  Toileting Assistance with Device: Stand by  Functional Assistance: Stand by (with wheeled walker)    Activity Tolerance:  Endurance: Decreased tolerance for upright activites    Bed Mobility/Transfers:   Bed Mobility  Bed Mobility: Yes  Bed Mobility 1  Bed Mobility 1:  (supine to sit and sit to supine with independent)  Transfers  Transfer:  (sit to stand from transfer from bed supervision)    Ambulation/Gait Training:  Functional Mobility  Functional Mobility Performed:  (pt able to ambulate in room and in hallway with wheeled walker with abnormal but (normal ) gait for pt prior level of function.  pt has inverted right knee and abduct hip position to center her base of support.  Son reported pt at baseline for mobility.)    Sitting Balance:  Static Sitting Balance  Static Sitting-Level of Assistance: Independent  Dynamic Sitting Balance  Dynamic  Sitting-Level of Assistance: Independent    Vision: Vision - Basic Assessment  Current Vision: Wears glasses all the time   and      Sensation:  Light Touch: No apparent deficits    Strength:  Strength Comments: wfl    Coordination:  Movements are Fluid and Coordinated: Yes (for ue.)     Hand Function:  Hand Function  Gross Grasp: Functional  Coordination: Functional    Extremities: RUE   RUE : Within Functional Limits and LUE   LUE: Within Functional Limits    Outcome Measures: [facility] Daily Activity  Putting on and taking off regular lower body clothing: None  Bathing (including washing, rinsing, drying): A little  Putting on and taking off regular upper body clothing: None  Toileting, which includes using toilet, bedpan or urinal: None  Taking care of personal grooming such as brushing teeth: None  Eating Meals: None  Daily Activity - Total Score: 23        EDUCATION:     Education Documentation  Body Mechanics, taught by Chelsie Mckeon OT at 3/24/2025  1:30 PM.  Learner: Patient  Readiness: Acceptance  Method: Explanation  Response: Verbalizes Understanding    Precautions, taught by Chelsie Mckeon OT at 3/24/2025  1:30 PM.  Learner: Patient  Readiness: Acceptance  Method: Explanation  Response: Verbalizes Understanding    Education Comments  No comments found.

## 2025-03-24 NOTE — ED PROVIDER NOTES
EMERGENCY DEPARTMENT ENCOUNTER      Pt Name: Mary Marsh  MRN: [id number]  Birthdate 1955  Date of evaluation: 3/24/2025  Provider: Sonia Marin MD    CHIEF COMPLAINT       Chief Complaint   Patient presents with    Chest Pain       HISTORY OF PRESENT ILLNESS    Mary Marsh is a 69 y.o. female who presents to the emergency department via EMS with central chest pain described as heaviness sensation.  Patient states that symptoms have been intermittently going on for 1 to 2 weeks.  She is also experiencing some shortness of breath only when laying flat in bed.  Patient denies any fever, cough, congestion, abdominal pain, dizziness, numbness, tingling, DVT/PE history or any other concerns.    Nursing Notes were reviewed.    History provided by  patient.  No  used.    REVIEW OF SYSTEMS     ROS is otherwise negative unless stated above.    PAST MEDICAL HISTORY     Past Medical History:   Diagnosis Date    Adrenal nodule (Multi)     Anemia     Arthritis     Body mass index (BMI) 27.0-27.9, adult     BMI 27.0-27.9,adult    Cataract     Cerebral vascular accident (Multi)     Closed left hip fracture, initial encounter (Multi)     s/p IMN    COPD (chronic obstructive pulmonary disease) (Multi)     Depression     Emphysema of lung (Multi)     Generalized pruritus     GERD (gastroesophageal reflux disease)     Hypertension     Irritable bowel syndrome     Migraines     Peripheral neuropathy     Right tibial fracture 2023    Urinary incontinence     Vision loss        SURGICAL HISTORY       Past Surgical History:   Procedure Laterality Date    ANKLE SURGERY      APPENDECTOMY      BREAST BIOPSY       SECTION, LOW TRANSVERSE      CHOLECYSTECTOMY      COLONOSCOPY      KNEE SURGERY      ORIF TIBIA FRACTURE         ALLERGIES     Amoxicillin, Egg, and Nitrofurantoin monohyd/m-cryst    FAMILY HISTORY       Family History   Problem Relation Name Age of Onset    Diabetes  Mother      Hypertension Mother      Lung cancer Father          tobacco use    Cancer Sister      Lung cancer Brother          tobacco use    Breast cancer Mother's Sister          SOCIAL HISTORY       Social History     Socioeconomic History    Marital status:    Tobacco Use    Smoking status: Former     Types: Cigarettes    Smokeless tobacco: Former   Substance and Sexual Activity    Alcohol use: Never    Drug use: Never    Sexual activity: Defer     Social Drivers of Health     Financial Resource Strain: Low Risk  (12/20/2023)    Overall Financial Resource Strain (CARDIA)     Difficulty of Paying Living Expenses: Not hard at all   Transportation Needs: No Transportation Needs (5/13/2024)    OASIS : Transportation     Lack of Transportation (Medical): No     Lack of Transportation (Non-Medical): No     Patient Unable or Declines to Respond: No   Social Connections: Feeling Socially Integrated (5/13/2024)    OASIS : Social Isolation     Frequency of experiencing loneliness or isolation: Never   Recent Concern: Social Connections - Feeling Somewhat Isolated (3/19/2024)    OASIS : Social Isolation     Frequency of experiencing loneliness or isolation: Sometimes   Housing Stability: Low Risk  (12/22/2023)    Housing Stability Vital Sign     Unable to Pay for Housing in the Last Year: No     Number of Places Lived in the Last Year: 1     Unstable Housing in the Last Year: No       PHYSICAL EXAM   VS: As documented in the triage note and EMR flowsheet from this visit were reviewed.    GEN: NAD, nontoxic, well-appearing, ambulates without difficulty  EYES:  EOMs grossly intact, anicteric sclera, no nystagmus noted, clear and equal bilaterally, no foreign body noted  HEENT: Airway patent, ears with clear tympanic membranes bilaterally. Nasal mucosa clear. Mouth with normal mucosa.  No area of abscess or fluctuance noted.  No drainage noted. Throat has no vesicles, no oropharyngeal exudates and uvula  is midline. Face with no lymph node enlargement. No trismus or drooling noted.  Handling secretions.  Speech clear.  CARD: RRR, nontender chest, no crepitus deformities, no JVD, no murmurs rubs or gallops ; No edema noted.  Positive pulses bilaterally throughout.  Capillary refill less than 3 seconds.  No abnormal redness, warmth, tenderness or swelling noted to bilateral lower extremities.  PULMONARY: Clear all lung fields. Moving air well, Nonlabored, no accessory muscle use, able to speak complete sentences  ABDOMEN: Abdomen soft, non-distended, no rebound, no guarding. Bowel sounds normal in all 4 quadrants. No tenderness to palpation.  No CVA tenderness.  No masses or organomegaly noted.  No evidence of peritonitis. Negative Dang's and McBurney's point tenderness.  No Rovsing's.  : deferred  MUSK: Spine appears normal, range of motion is not limited, no muscle or joint tenderness. Strength 5 out of 5 equal bilaterally throughout.  No step-offs, deformities or additional signs of trauma noted.  No spinal/midline tenderness to palpation  SKIN: Skin normal color for race, warm, dry and intact. No evidence of trauma. No rash noted.  NEURO: Alert and oriented x 3, speech is clear, no obvious deficits noted. No facial droop noted.  Speech clear.  Negative Kernig's and Brudzinski sign.  PSYCH: Alert and oriented to person, place, time/situation.  Anxious. no apparent risk to self or others. Thoughts are linear.  Does not appear decompensated.  Does not appear internally stimulated.  LYMPH: No adenopathy or splenomegaly. No cervical, supraclavicular or inguinal lymphadenopathy.    DIAGNOSTIC RESULTS   RADIOLOGY:   Non-plain film images such as CT, Ultrasound and MRI are read by the radiologist. Plain radiographic images are visualized and preliminarily interpreted by myself with the below findings:      Interpretation per the Radiologist below, if available at the time of this note:    XR chest 1 view    (Results  Pending)         LABS:  Labs Reviewed   CBC WITH AUTO DIFFERENTIAL - Abnormal       Result Value    WBC 4.5      nRBC 0.0      RBC 4.29      Hemoglobin 11.5 (*)     Hematocrit 36.5      MCV 85      MCH 26.8      MCHC 31.5 (*)     RDW 14.5      Platelets 342      Neutrophils % 52.2      Immature Granulocytes %, Automated 0.4      Lymphocytes % 34.2      Monocytes % 9.6      Eosinophils % 2.9      Basophils % 0.7      Neutrophils Absolute 2.33      Immature Granulocytes Absolute, Automated 0.02      Lymphocytes Absolute 1.53      Monocytes Absolute 0.43      Eosinophils Absolute 0.13      Basophils Absolute 0.03     COMPREHENSIVE METABOLIC PANEL - Abnormal    Glucose 97      Sodium 134 (*)     Potassium 4.1      Chloride 104      Bicarbonate 21      Anion Gap 13      Urea Nitrogen 13      Creatinine 1.06 (*)     eGFR 57 (*)     Calcium 9.5      Albumin 4.4      Alkaline Phosphatase 88      Total Protein 6.8      AST 18      Bilirubin, Total 0.3      ALT 12     MAGNESIUM - Normal    Magnesium 1.85     SERIAL TROPONIN-INITIAL - Normal    Troponin I, High Sensitivity 3      Narrative:     Less than 99th percentile of normal range cutoff-  Female and children under 18 years old <14 ng/L; Male <21 ng/L: Negative  Repeat testing should be performed if clinically indicated.     Female and children under 18 years old 14-50 ng/L; Male 21-50 ng/L:  Consistent with possible cardiac damage and possible increased clinical   risk. Serial measurements may help to assess extent of myocardial damage.     >50 ng/L: Consistent with cardiac damage, increased clinical risk and  myocardial infarction. Serial measurements may help assess extent of   myocardial damage.      NOTE: Children less than 1 year old may have higher baseline troponin   levels and results should be interpreted in conjunction with the overall   clinical context.     NOTE: Troponin I testing is performed using a different   testing methodology at [facility] than at other   system hospitals. Direct result comparisons should only   be made within the same method.   B-TYPE NATRIURETIC PEPTIDE - Normal    BNP 43      Narrative:        <100 pg/mL - Heart failure unlikely  100-299 pg/mL - Intermediate probability of acute heart                  failure exacerbation. Correlate with clinical                  context and patient history.    >=300 pg/mL - Heart Failure likely. Correlate with clinical                  context and patient history.    BNP testing is performed using different testing methodology at [facility] than at other [facility] hospitals. Direct result comparisons should only be made within the same method.      TROPONIN SERIES- (INITIAL, 1 HR)    Narrative:     The following orders were created for panel order Troponin I Series, High Sensitivity (0, 1 HR).  Procedure                               Abnormality         Status                     ---------                               -----------         ------                     Troponin I, High Sensiti...[[id number]]  Normal              Final result               Troponin, High Sensitivi...[[id number]]                                                   Please view results for these tests on the individual orders.   SERIAL TROPONIN, 1 HOUR       All other labs were within normal range or not returned as of this dictation.    EMERGENCY DEPARTMENT COURSE/MDM:   Vitals:    Vitals:    03/24/25 0400 03/24/25 0406 03/24/25 0600   BP: 129/81 129/81    Pulse: 70 71    Resp:  18 17   Temp:  36.8 °C (98.2 °F)    SpO2: 95% 100%    Weight:  90.7 kg (200 lb)    Height:  1.8 m (5' 10.87\")        I reviewed the patient's triage vitals.      ED Course as of 03/24/25 0621   Mon Mar 24, 2025   0527 Patient states she took her aspirin dose at home before coming over to the emergency department. [AM]   0555 EKG completed at 4:11.  Ventricular rate of 69.  Normal TX.  Incomplete right bundle branch block.  " Nonspecific T wave abnormality.  No STEMI. [AM]   0557 Chest x-ray with no pneumothorax, no pleural effusion, no focal consolidation. [AM]   0601 Results were discussed with patient.  She agree with admission for further cardiac evaluation. [AM]   0621 Dr Qiu called back. Case was discussed. He agreed on admission [AM]      ED Course User Index  [AM] Sonia Marin MD         Diagnoses as of 03/24/25 0621   Chest pain, unspecified type       Patient was counseled regarding labs, imaging, likely diagnosis, and plan. All questions were answered.     ------------------------------------------------------------------      Differential Diagnoses Considered: ACS, arrhythmia, pneumonia, pneumothorax, pleural effusion, CHF, electrolyte imbalance, others        ------------------------------------------------------------------  ED Medications administered this visit:    Medications   aspirin chewable tablet 162 mg (162 mg oral Not Given 3/24/25 0526)   nitroglycerin (Nitrostat) SL tablet 0.4 mg (has no administration in time range)       New Prescriptions from this visit:    New Prescriptions    No medications on file       Follow-up:  No follow-up provider specified.      Final Impression:   1. Chest pain, unspecified type          Sonia Marin MD    T  (Please note that portions of this note were completed with a voice recognition program.  Efforts were made to edit the dictations but occasionally words are mis-transcribed.)     Sonia Marin MD  03/24/25 0622

## 2025-03-24 NOTE — PROGRESS NOTES
Pharmacy Medication History Review    Mary Marsh is a 69 y.o. female admitted for Chest pain, unspecified type. Pharmacy reviewed the patient's [id number] medications and allergies for accuracy.    The list below reflectives the updated PTA list. Please review each medication in order reconciliation for additional clarification and justification.  Prior to Admission medications    Medication Sig Start Date End Date Authorizing Provider   amLODIPine (Norvasc) 10 mg tablet TAKE 1 TABLET BY MOUTH EVERY DAY   Anupam Vo, DO   aspirin 81 mg EC tablet Take 1 tablet (81 mg) by mouth once daily.   Historical Provider, MD   benazepril (Lotensin) 40 mg tablet TAKE 1 TABLET BY MOUTH EVERY DAY   Anupam Vo, DO   calcium carbonate-vitamin D3 600 mg-5 mcg (200 unit) tablet Take 1 tablet by mouth once daily.   Historical Provider, MD   fenofibrate (Triglide) 160 mg tablet TAKE 1 TABLET BY MOUTH EVERY DAY   Anupam Vo, DO   ferrous sulfate tablet Take 1 tablet (325 mg) by mouth once daily. Take with food.   Aly Qiu MD   FLUoxetine (PROzac) 20 mg capsule Take 2 capsules (40 mg) by mouth once daily.   Historical Provider, MD   Klor-Con M20 20 mEq ER tablet TAKE 1 TABLET BY MOUTH EVERY DAY   Aly Qiu MD   lamoTRIgine (LaMICtal) 150 mg tablet Take 1 tablet (150 mg) by mouth once daily at bedtime.   Historical Provider, MD   LORazepam (Ativan) 1 mg tablet Take 1 tablet (1 mg) by mouth 2 times a day.   Historical Provider, MD   mirtazapine (Remeron) 7.5 mg tablet Take 1 tablet (7.5 mg) by mouth once daily at bedtime.   Historical Provider, MD   multivitamin with minerals (multivitamin) tablet Take 1 tablet by mouth once daily.   Historical Provider, MD   pantoprazole (ProtoNix) 40 mg EC tablet Take 1 tablet (40 mg) by mouth once daily. NEEDS APPT FOR FURTHER REFILLS   Anupam Vo,    topiramate (Topamax Sprinkle) 25 mg capsule Take 2 capsules (50 mg) by mouth 2 times a day.    Aly Qiu MD   dicyclomine (Bentyl) 10 mg capsule TAKE 1 CAPSULE (10 MG) BY MOUTH EVERY 6 HOURS IF NEEDED   Aly Qiu MD   ergocalciferol (Vitamin D-2) 1250 mcg (50,000 units) capsule Take 1 capsule (1,250 mcg) by mouth 1 (one) time per week. On Wednesday   Aly Qiu MD   lactase (LACTAID ORAL) Take 1 tablet by mouth if needed.   Historical Provider, MD        The list below reflectives the updated allergy list. Please review each documented allergy for additional clarification and justification.  Allergies  Reviewed by Adriana Lott on 3/24/2025        Severity Reactions Comments    Amoxicillin Medium Swelling     Egg Medium Diarrhea, Headache     Lactose Medium Diarrhea, GI Upset     Nitrofurantoin Monohyd/m-cryst Medium Nausea/vomiting             Below are additional concerns with the patient's PTA list.      Adriana Lott

## 2025-03-25 ENCOUNTER — APPOINTMENT (OUTPATIENT)
Dept: PRIMARY CARE | Facility: CLINIC | Age: 70
End: 2025-03-25
Payer: COMMERCIAL

## 2025-03-25 VITALS
HEART RATE: 68 BPM | SYSTOLIC BLOOD PRESSURE: 114 MMHG | BODY MASS INDEX: 28 KG/M2 | RESPIRATION RATE: 18 BRPM | OXYGEN SATURATION: 96 % | DIASTOLIC BLOOD PRESSURE: 57 MMHG | HEIGHT: 71 IN | TEMPERATURE: 96.4 F | WEIGHT: 200 LBS

## 2025-03-25 LAB
ANION GAP SERPL CALC-SCNC: 11 MMOL/L (ref 10–20)
BUN SERPL-MCNC: 10 MG/DL (ref 6–23)
CALCIUM SERPL-MCNC: 9.1 MG/DL (ref 8.6–10.3)
CHLORIDE SERPL-SCNC: 107 MMOL/L (ref 98–107)
CO2 SERPL-SCNC: 22 MMOL/L (ref 21–32)
CREAT SERPL-MCNC: 1.03 MG/DL (ref 0.5–1.05)
EGFRCR SERPLBLD CKD-EPI 2021: 59 ML/MIN/1.73M*2
ERYTHROCYTE [DISTWIDTH] IN BLOOD BY AUTOMATED COUNT: 14.8 % (ref 11.5–14.5)
GLUCOSE SERPL-MCNC: 91 MG/DL (ref 74–99)
HCT VFR BLD AUTO: 37.1 % (ref 36–46)
HGB BLD-MCNC: 11.6 G/DL (ref 12–16)
MCH RBC QN AUTO: 27.2 PG (ref 26–34)
MCHC RBC AUTO-ENTMCNC: 31.3 G/DL (ref 32–36)
MCV RBC AUTO: 87 FL (ref 80–100)
NRBC BLD-RTO: 0 /100 WBCS (ref 0–0)
PLATELET # BLD AUTO: 314 X10*3/UL (ref 150–450)
POTASSIUM SERPL-SCNC: 3.7 MMOL/L (ref 3.5–5.3)
RBC # BLD AUTO: 4.27 X10*6/UL (ref 4–5.2)
SODIUM SERPL-SCNC: 136 MMOL/L (ref 136–145)
WBC # BLD AUTO: 3.7 X10*3/UL (ref 4.4–11.3)

## 2025-03-25 PROCEDURE — 99238 HOSP IP/OBS DSCHRG MGMT 30/<: CPT

## 2025-03-25 PROCEDURE — 2500000002 HC RX 250 W HCPCS SELF ADMINISTERED DRUGS (ALT 637 FOR MEDICARE OP, ALT 636 FOR OP/ED)

## 2025-03-25 PROCEDURE — 80048 BASIC METABOLIC PNL TOTAL CA: CPT

## 2025-03-25 PROCEDURE — 99222 1ST HOSP IP/OBS MODERATE 55: CPT | Performed by: INTERNAL MEDICINE

## 2025-03-25 PROCEDURE — 2500000004 HC RX 250 GENERAL PHARMACY W/ HCPCS (ALT 636 FOR OP/ED)

## 2025-03-25 PROCEDURE — 36415 COLL VENOUS BLD VENIPUNCTURE: CPT

## 2025-03-25 PROCEDURE — 96372 THER/PROPH/DIAG INJ SC/IM: CPT

## 2025-03-25 PROCEDURE — G0378 HOSPITAL OBSERVATION PER HR: HCPCS

## 2025-03-25 PROCEDURE — 85027 COMPLETE CBC AUTOMATED: CPT

## 2025-03-25 PROCEDURE — 2500000001 HC RX 250 WO HCPCS SELF ADMINISTERED DRUGS (ALT 637 FOR MEDICARE OP)

## 2025-03-25 RX ORDER — NITROGLYCERIN 0.4 MG/1
0.4 TABLET SUBLINGUAL EVERY 5 MIN PRN
Qty: 90 TABLET | Refills: 12 | Status: CANCELLED | OUTPATIENT
Start: 2025-03-25

## 2025-03-25 RX ORDER — AMINOPHYLLINE 25 MG/ML
125 INJECTION, SOLUTION INTRAVENOUS ONCE AS NEEDED
Status: CANCELLED | OUTPATIENT
Start: 2025-03-25

## 2025-03-25 RX ORDER — REGADENOSON 0.08 MG/ML
0.4 INJECTION, SOLUTION INTRAVENOUS
Status: CANCELLED | OUTPATIENT
Start: 2025-03-25

## 2025-03-25 RX ADMIN — Medication 1 TABLET: at 06:42

## 2025-03-25 RX ADMIN — FLUOXETINE HYDROCHLORIDE 40 MG: 20 CAPSULE ORAL at 08:32

## 2025-03-25 RX ADMIN — FERROUS SULFATE TAB 325 MG (65 MG ELEMENTAL FE) 325 MG: 325 (65 FE) TAB at 08:32

## 2025-03-25 RX ADMIN — FENOFIBRATE 160 MG: 160 TABLET ORAL at 08:32

## 2025-03-25 RX ADMIN — TOPIRAMATE 50 MG: 25 TABLET, FILM COATED ORAL at 08:32

## 2025-03-25 RX ADMIN — POTASSIUM CHLORIDE 20 MEQ: 1500 TABLET, EXTENDED RELEASE ORAL at 08:32

## 2025-03-25 RX ADMIN — PANTOPRAZOLE SODIUM 40 MG: 40 TABLET, DELAYED RELEASE ORAL at 08:32

## 2025-03-25 RX ADMIN — HEPARIN SODIUM 5000 UNITS: 5000 INJECTION INTRAVENOUS; SUBCUTANEOUS at 06:42

## 2025-03-25 RX ADMIN — LORAZEPAM 1 MG: 0.5 TABLET ORAL at 08:32

## 2025-03-25 RX ADMIN — AMLODIPINE BESYLATE 10 MG: 10 TABLET ORAL at 08:32

## 2025-03-25 RX ADMIN — ASPIRIN 81 MG: 81 TABLET, COATED ORAL at 08:32

## 2025-03-25 ASSESSMENT — PAIN SCALES - GENERAL: PAINLEVEL_OUTOF10: 0 - NO PAIN

## 2025-03-25 ASSESSMENT — COGNITIVE AND FUNCTIONAL STATUS - GENERAL
CLIMB 3 TO 5 STEPS WITH RAILING: A LITTLE
WALKING IN HOSPITAL ROOM: A LITTLE
MOBILITY SCORE: 20
DRESSING REGULAR LOWER BODY CLOTHING: A LITTLE
HELP NEEDED FOR BATHING: A LITTLE
DAILY ACTIVITIY SCORE: 22
STANDING UP FROM CHAIR USING ARMS: A LITTLE
MOVING TO AND FROM BED TO CHAIR: A LITTLE

## 2025-03-25 NOTE — CARE PLAN
The patient's goals for the shift include rest     The clinical goals for the shift include Patient will remain free from falls by the end of shift

## 2025-03-25 NOTE — CARE PLAN
The patient's goals for the shift include      The clinical goals for the shift include Patient will remain free from falls by the end of shift    Problem: Pain - Adult  Goal: Verbalizes/displays adequate comfort level or baseline comfort level  Outcome: Progressing     Problem: Safety - Adult  Goal: Free from fall injury  Outcome: Progressing     Problem: Discharge Planning  Goal: Discharge to home or other facility with appropriate resources  Outcome: Progressing     Problem: Chronic Conditions and Co-morbidities  Goal: Patient's chronic conditions and co-morbidity symptoms are monitored and maintained or improved  Outcome: Progressing     Problem: Nutrition  Goal: Nutrient intake appropriate for maintaining nutritional needs  Outcome: Progressing

## 2025-03-25 NOTE — CONSULTS
Inpatient consult to Cardiology  Consult performed by: Taz Tapia DO  Consult ordered by: Aly Qiu MD  Reason for consult: Chest pain        History Of Present Illness:    Mary Marsh is a 69 y.o. female with history of remote stroke (no deficits), emphysema, hypertension but no other cardiovascular disease (normal 2019 nuclear stress test) presenting with chest pain.  Patient states that for 1 to 2 weeks she has had a persistent heaviness in the center of her chest.  Intermittently she gets random, sharp pains throughout the chest.  She denies any associated shortness of breath or nausea or vomiting or palpitations.  Activity is limited by loss of balance-she generally uses a walker to get around.  She notes that she used to take an inhaler for her emphysema but no longer does.  She has a an appoint with pulmonary next week.     Last Recorded Vitals:  Vitals:    03/24/25 1900 03/25/25 0112 03/25/25 0828 03/25/25 1055   BP: 108/67 115/59 114/61 114/57   BP Location:       Patient Position:       Pulse: 69 66 61 68   Resp: 18  18    Temp: 36 °C (96.8 °F) 36.1 °C (97 °F) 35.8 °C (96.4 °F)    TempSrc: Temporal  Temporal    SpO2: 95% 94% 95% 96%   Weight:       Height:           Last Labs:  CBC - 3/25/2025:  6:59 AM  3.7 11.6 314    37.1      CMP - 3/25/2025:  6:59 AM  9.1 6.8 18 --- 0.3   _ 4.4 12 88      PTT - No results in last year.  _   _ _     Troponin I, High Sensitivity   Date/Time Value Ref Range Status   03/24/2025 06:49 AM 3 0 - 13 ng/L Final   03/24/2025 05:13 AM 3 0 - 13 ng/L Final     BNP   Date/Time Value Ref Range Status   03/24/2025 05:13 AM 43 0 - 99 pg/mL Final     Hemoglobin A1C   Date/Time Value Ref Range Status   05/24/2024 12:15 PM 5.2 see below % Final   09/13/2022 12:01 PM 5.7 (A) % Final     Comment:          Diagnosis of Diabetes-Adults   Non-Diabetic: < or = 5.6%   Increased risk for developing diabetes: 5.7-6.4%   Diagnostic of diabetes: > or = 6.5%  .       Monitoring  of Diabetes                Age (y)     Therapeutic Goal (%)   Adults:          >18           <7.0   Pediatrics:    13-18           <7.5                   7-12           <8.0                   0- 6            7.5-8.5   American Diabetes Association. Diabetes Care 33(S1), Jan 2010.     05/06/2020 12:00 AM 5.3 % Final     Comment:          Diagnosis of Diabetes-Adults   Non-Diabetic: < or = 5.6%   Increased risk for developing diabetes: 5.7-6.4%   Diagnostic of diabetes: > or = 6.5%  .       Monitoring of Diabetes                Age (y)     Therapeutic Goal (%)   Adults:          >18           <7.0   Pediatrics:    13-18           <7.5                   7-12           <8.0                   0- 6            7.5-8.5   American Diabetes Association. Diabetes Care 33(S1), Jan 2010.       LDL Calculated   Date/Time Value Ref Range Status   05/24/2024 12:15  (H) <=99 mg/dL Final     Comment:                                 Near   Borderline      AGE      Desirable  Optimal    High     High     Very High     0-19 Y     0 - 109     ---    110-129   >/= 130     ----    20-24 Y     0 - 119     ---    120-159   >/= 160     ----      >24 Y     0 -  99   100-129  130-159   160-189     >/=190       VLDL   Date/Time Value Ref Range Status   05/24/2024 12:15 PM 21 0 - 40 mg/dL Final   09/13/2022 12:01 PM 14 0 - 40 mg/dL Final   05/17/2019 01:06 PM 10 0 - 40 mg/dL Final      Last I/O:  I/O last 3 completed shifts:  In: 590 (6.5 mL/kg) [P.O.:590]  Out: - (0 mL/kg)   Weight: 90.7 kg     Past Cardiology Tests (Last 3 Years):  EKG:    Normal sinus rhythm with nonspecific anterior T wave changes-no significant changes versus the 1215/23 recording    Echo:  Transthoracic Echo (TTE) Complete 03/24/2025    Ejection Fractions:  EF   Date/Time Value Ref Range Status   03/24/2025 11:29 AM 69 %    No regional wall motion normalities  Cath:  No results found for this or any previous visit from the past 1095 days.    Stress Test: 2019  Normal  perfusion    Cardiac Imaging:  Chest x-ray      Past Medical History:  She has a past medical history of Adrenal nodule (Multi), Anemia, Arthritis, Body mass index (BMI) 27.0-27.9, adult, Cataract, Cerebral vascular accident (Multi), Closed left hip fracture, initial encounter (Multi), COPD (chronic obstructive pulmonary disease) (Multi), Depression, Emphysema of lung (Multi), Generalized pruritus, GERD (gastroesophageal reflux disease), Hypertension, Irritable bowel syndrome, Migraines, Peripheral neuropathy, Right tibial fracture (2023), Urinary incontinence, and Vision loss.    Past Surgical History:  She has a past surgical history that includes Appendectomy;  section, low transverse; Cholecystectomy; Colonoscopy (); Knee surgery; Ankle surgery; Breast biopsy; and ORIF tibia fracture ().      Social History:  She reports that she has quit smoking. Her smoking use included cigarettes. She has quit using smokeless tobacco. She reports that she does not drink alcohol and does not use drugs.    Family History:  Family History   Problem Relation Name Age of Onset    Diabetes Mother      Hypertension Mother      Lung cancer Father          tobacco use    Cancer Sister      Lung cancer Brother          tobacco use    Breast cancer Mother's Sister          Allergies:  Amoxicillin, Egg, Lactose, and Nitrofurantoin monohyd/m-cryst    Inpatient Medications:  Scheduled medications   Medication Dose Route Frequency    amLODIPine  10 mg oral Daily    aspirin  81 mg oral Daily    calcium carbonate-vitamin D3  1 tablet oral Daily    fenofibrate  160 mg oral Daily    ferrous sulfate  1 tablet oral Daily    FLUoxetine  40 mg oral Daily    heparin (porcine)  5,000 Units subcutaneous q8h DOROTHY    lamoTRIgine  150 mg oral Nightly    LORazepam  1 mg oral BID    mirtazapine  7.5 mg oral Nightly    pantoprazole  40 mg oral Daily    perflutren lipid microspheres  0.5-10 mL of dilution intravenous Once in imaging     perflutren protein A microsphere  0.5 mL intravenous Once in imaging    polyethylene glycol  17 g oral Daily    potassium chloride CR  20 mEq oral Daily    sulfur hexafluoride microsphr  2 mL intravenous Once in imaging    topiramate  50 mg oral BID     PRN medications   Medication    acetaminophen    dextromethorphan-guaifenesin    dextrose    dextrose    dicyclomine    glucagon    glucagon    melatonin    nitroglycerin    ondansetron    Or    ondansetron     Continuous Medications   Medication Dose Last Rate     Outpatient Medications:  Current Outpatient Medications   Medication Instructions    amLODIPine (NORVASC) 10 mg, oral, Daily    aspirin 81 mg, Daily    benazepril (LOTENSIN) 40 mg, oral, Daily    calcium carbonate-vitamin D3 600 mg-5 mcg (200 unit) tablet 1 tablet, Daily RT    dicyclomine (Bentyl) 10 mg capsule TAKE 1 CAPSULE (10 MG) BY MOUTH EVERY 6 HOURS IF NEEDED    ergocalciferol (VITAMIN D-2) 1,250 mcg, oral, Once Weekly    fenofibrate (TRIGLIDE) 160 mg, oral, Daily    ferrous sulfate 325 mg, oral, Daily, Take with food.    FLUoxetine (PROzac) 20 mg capsule Take 2 capsules (40 mg) by mouth once daily.    gabapentin (NEURONTIN) 100 mg, oral, Nightly    Klor-Con M20 20 mEq ER tablet 20 mEq, oral, Daily    lactase (LACTAID ORAL) 1 tablet, oral, As needed    lamoTRIgine (LaMICtal) 150 mg tablet 1 tablet, oral, Nightly    LORazepam (Ativan) 1 mg tablet 1 tablet, 2 times daily    mirtazapine (REMERON) 7.5 mg, Nightly    multivitamin with minerals (multivitamin) tablet 1 tablet, Daily RT    pantoprazole (PROTONIX) 40 mg, oral, Daily, NEEDS APPT FOR FURTHER REFILLS    topiramate (TOPAMAX SPRINKLE) 50 mg, oral, 2 times daily       Physical Exam:  PHYSICAL EXAM:    Constitutional/General: Frail, middle-age female who appears older than her stated age-she is in no distress  Neck:  No increased JVP,no carotid bruits.  Respiratory:  Lungs clear to auscultation bilaterally, no wheezes, rales, or rhonchi, not in  respiratory distress  Cardiovascular:  Regular rate, regular rhythm, no murmurs, gallops, or rubs, PMI nondisplaced, 2+ distal pulses  Chest:  No chest wall tenderness  GI:  Abdomen soft, nontender, nondistended, + BS, no organomegaly, no palpable masses, no rebound, guarding, or rigidity  Musculoskeletal:  Moves all extremities x4-has leg weakness-ambulates with walker  Extremities: No peripheral edema  Integument: Skin warm and dry, no rashes  Neurologic:  No focal deficits  Psychiatric: Flat affect      Assessment/Plan   Mary Marsh is a 69 y.o. female with history of remote stroke (no deficits), emphysema, hypertension but no other cardiovascular disease (normal 2019 nuclear stress test) presenting with chest pain.  1.  Chest pain-no evidence for acute coronary syndrome per EKGs/high-sensitivity troponin/echo.  As patient has had persistent pain for 1 to 2 weeks suspect it is noncardiac in etiology but she has had no functional testing since 2019 and has poor functional status i.e. walks with walker therefore proceed with pharmacologic nuclear stress test as outpatient.  2.  Hypertension-blood pressure controlled on current meds    -Stable for cardiac discharge today with outpatient stress test and then follow-up, discussed with primary care    Code Status:  Full Code            Taz Tapia DO

## 2025-03-25 NOTE — PROGRESS NOTES
[id number]   Discharge Planning   Living Arrangements Alone   Support Systems Family members   Assistance Needed None   Type of Residence Private residence   Do you have animals or pets at home? No   Who is requesting discharge planning? Provider   Home or Post Acute Services None   Does the patient need discharge transport arranged? No     TCC Note: Met with pt. at bedside, introduced self and role on the Transition of Care Team.  Verified name, , demographics, insurance, PCP is TUTU Greer at the [facility] and Emergency contact is sonGilberto Phone: [phone]. Pt. lives alone in a multi level home with 0 CATIA and has a first floor bed/bath. Pt. is Independent with ADLs, drives and works. Pt. denies any recent falls and does walker an assistive device for ambulation. Pt. is not diabetic and does not use any home O2 or any other home services/supplies. Preferred pharmacy is  [facility] on [address]. No problems affording or obtaining medications. Pt. states that she has no discharge needs at this time. Transitions of Care will continue to follow until discharge. Lazara Ray, MSN, RN, TCC.

## 2025-03-25 NOTE — DISCHARGE SUMMARY
Discharge Diagnosis  #ACS rule out  #Atypical chest pain  #Hypertension  #Dyslipidemia  #Migraine  #GERD  #COPD  #Anxiety disorder  #Bipolar disorder  #Chronic normocytic anemia  Discharge Meds     Medication List      CONTINUE taking these medications     amLODIPine 10 mg tablet; Commonly known as: Norvasc; TAKE 1 TABLET BY   MOUTH EVERY DAY   aspirin 81 mg EC tablet   benazepril 40 mg tablet; Commonly known as: Lotensin; TAKE 1 TABLET BY   MOUTH EVERY DAY   calcium carbonate-vitamin D3 600 mg-5 mcg (200 unit) tablet   dicyclomine 10 mg capsule; Commonly known as: Bentyl; TAKE 1 CAPSULE (10   MG) BY MOUTH EVERY 6 HOURS IF NEEDED   ergocalciferol 1250 mcg (50,000 units) capsule; Commonly known as:   Vitamin D-2; Take 1 capsule (1,250 mcg) by mouth 1 (one) time per week.   fenofibrate 160 mg tablet; Commonly known as: Triglide; TAKE 1 TABLET BY   MOUTH EVERY DAY   ferrous sulfate tablet; Take 1 tablet (325 mg) by mouth once daily. Take   with food.   FLUoxetine 20 mg capsule; Commonly known as: PROzac   Klor-Con M20 20 mEq ER tablet; Generic drug: potassium chloride CR; TAKE   1 TABLET BY MOUTH EVERY DAY   LACTAID ORAL   lamoTRIgine 150 mg tablet; Commonly known as: LaMICtal   LORazepam 1 mg tablet; Commonly known as: Ativan   mirtazapine 7.5 mg tablet; Commonly known as: Remeron   multivitamin with minerals tablet   pantoprazole 40 mg EC tablet; Commonly known as: ProtoNix; Take 1 tablet   (40 mg) by mouth once daily. NEEDS APPT FOR FURTHER REFILLS   topiramate 25 mg capsule; Commonly known as: Topamax Sprinkle; Take 2   capsules (50 mg) by mouth 2 times a day.     STOP taking these medications     gabapentin 100 mg capsule; Commonly known as: Neurontin       Test Results Pending At Discharge  Pending Labs       No current pending labs.            Hospital Course  Mary Marsh is a 69 y.o. female with a past medical history of COPD, hypertension, dyslipidemia, migraine, GERD, presented to the ED from home via  EMS due to chief complaints of midsternal chest pain, occurring intermittently since 2 to 3 weeks.  In the ED she was hemodynamically stable, EKG showed incomplete right bundle branch block,no ST elevations, nonspecific T wave abnormality, rate 69.  CXR did not show any evidence of pneumothorax, pleural effusion, focal consolidation.  Troponin resulted 3 and repeat was also 3.  BNP 43.  No significant electrolyte abnormalities were noted in the ED workup.  There was no leukocytosis mild anemia was noted hemoglobin of 11.5.  Patient was admitted under general medicine service for cardiology evaluation, since she is an elderly female presenting with midsternal chest pain.  An echocardiogram was ordered, showed an EF of 69%, grade 1 impaired relaxation pattern of LV diastolic filling with normal left atrial filling pressure, normal size right ventricle and right ventricular systolic pressure was also within limits.  Cardiology was consulted, they recommended patient have a Lexiscan stress test in the outpatient setting.  Per cardiology, patient is medically stable for discharge.  Patient has been asked to also follow-up with her primary care provider for posthospitalization follow-up.      Pertinent Physical Exam At Time of Discharge  Physical Exam    HENT:      Head: Normocephalic.   Eyes:      Conjunctiva/sclera: Conjunctivae normal.   Cardiovascular:      Rate and Rhythm: Normal rate.   Pulmonary:      Effort: Pulmonary effort is normal.      Breath sounds: Normal breath sounds.   Abdominal:      Palpations: Abdomen is soft.   Musculoskeletal:         General: Normal range of motion.      Cervical back: Normal range of motion.   Skin:     General: Skin is warm.   Neurological:      General: No focal deficit present.      Mental Status: She is alert.   Psychiatric:         Mood and Affect: Mood normal.          Outpatient Follow Up  Future Appointments   Date Time Provider Department Center   3/25/2025  3:45 PM Aly  CARLIE Qiu MD [id number] [place]   4/21/2025  2:00 PM Kristen De Souza MD [id number] [facility]         Daxa Prince MD  PGY1 IM

## 2025-03-25 NOTE — DISCHARGE INSTRUCTIONS
We have ordered a test called lexiscan stress test recommended by cardiology team.    Please follow-up with the cardiologist who saw you while you are in the hospital.  We have mentioned her name and follow-up provider list.    Please follow-up with your primary care provider for posthospitalization follow-up.    Please return to the ED if any of her symptoms worsen.  Continue to take your medications as usual.

## 2025-03-26 ENCOUNTER — PATIENT OUTREACH (OUTPATIENT)
Dept: PRIMARY CARE | Facility: CLINIC | Age: 70
End: 2025-03-26
Payer: COMMERCIAL

## 2025-03-26 LAB
ATRIAL RATE: 69 BPM
P AXIS: 63 DEGREES
P OFFSET: 207 MS
P ONSET: 146 MS
PR INTERVAL: 150 MS
Q ONSET: 221 MS
QRS COUNT: 12 BEATS
QRS DURATION: 100 MS
QT INTERVAL: 440 MS
QTC CALCULATION(BAZETT): 471 MS
QTC FREDERICIA: 461 MS
R AXIS: 28 DEGREES
T AXIS: 61 DEGREES
T OFFSET: 441 MS
VENTRICULAR RATE: 69 BPM

## 2025-03-26 NOTE — PROGRESS NOTES
Discharge Facility:[facility]  Discharge Diagnosis:  ACS rule out  Atypical chest pain  Hypertension  Dyslipidemia  Migraine  GERD  COPD  Anxiety disorder  Bipolar disorder  Chronic normocytic anemia    Admission Date:3/24/2025  Discharge Date: 3/25/2025    PCP Appointment Date:TBD  Specialist Appointment Date:  4/1/2025 Pulmonary/PT Emphysema  4/7/2025 Pulmonary/Ross    Hospital Encounter and Summary Linked: Yes  ED to Hosp-Admission (Discharged) with Aly Qiu MD; Sonia Marin MD (03/24/2025)     *Two attempts were made to reach patient within two business days after discharge. Voicemail left with contact information for patient to call back with any non-emergent questions or concerns.

## 2025-04-03 DIAGNOSIS — D50.9 IRON DEFICIENCY ANEMIA, UNSPECIFIED IRON DEFICIENCY ANEMIA TYPE: ICD-10-CM

## 2025-04-03 RX ORDER — FERROUS SULFATE 325(65) MG
1 TABLET ORAL DAILY
Qty: 30 TABLET | Refills: 0 | Status: SHIPPED | OUTPATIENT
Start: 2025-04-03

## 2025-04-06 DIAGNOSIS — Z00.00 ENCOUNTER FOR GENERAL ADULT MEDICAL EXAMINATION WITHOUT ABNORMAL FINDINGS: ICD-10-CM

## 2025-04-07 RX ORDER — TOPIRAMATE SPINKLE 25 MG/1
50 CAPSULE ORAL 2 TIMES DAILY
Qty: 120 CAPSULE | Refills: 0 | OUTPATIENT
Start: 2025-04-07 | End: 2025-05-07

## 2025-04-09 ENCOUNTER — PATIENT OUTREACH (OUTPATIENT)
Dept: PRIMARY CARE | Facility: CLINIC | Age: 70
End: 2025-04-09

## 2025-04-09 NOTE — PROGRESS NOTES
Call  after hospitalization.  At time of outreach call the patient feels as if their condition has not improved since last visit.  Mary is with cough, she is waiting to schedule Pulmonary appointment due to insurance coverage issue. We discussed may need to return to ED, she hesitates since they tell her nothing is wrong(she says)

## 2025-04-15 ENCOUNTER — APPOINTMENT (OUTPATIENT)
Dept: PRIMARY CARE | Facility: CLINIC | Age: 70
End: 2025-04-15
Payer: MEDICARE

## 2025-04-15 VITALS
DIASTOLIC BLOOD PRESSURE: 70 MMHG | OXYGEN SATURATION: 94 % | BODY MASS INDEX: 24.92 KG/M2 | HEART RATE: 69 BPM | SYSTOLIC BLOOD PRESSURE: 129 MMHG | WEIGHT: 178 LBS

## 2025-04-15 DIAGNOSIS — Z00.00 ENCOUNTER FOR GENERAL ADULT MEDICAL EXAMINATION WITHOUT ABNORMAL FINDINGS: ICD-10-CM

## 2025-04-15 DIAGNOSIS — J41.0 SIMPLE CHRONIC BRONCHITIS (MULTI): ICD-10-CM

## 2025-04-15 DIAGNOSIS — I10 HYPERTENSION, UNSPECIFIED TYPE: ICD-10-CM

## 2025-04-15 DIAGNOSIS — Z12.31 ENCOUNTER FOR SCREENING MAMMOGRAM FOR MALIGNANT NEOPLASM OF BREAST: ICD-10-CM

## 2025-04-15 DIAGNOSIS — R07.9 CHEST PAIN, UNSPECIFIED TYPE: Primary | ICD-10-CM

## 2025-04-15 DIAGNOSIS — Z12.39 ENCOUNTER FOR SCREENING FOR MALIGNANT NEOPLASM OF BREAST, UNSPECIFIED SCREENING MODALITY: ICD-10-CM

## 2025-04-15 PROCEDURE — 1125F AMNT PAIN NOTED PAIN PRSNT: CPT | Performed by: STUDENT IN AN ORGANIZED HEALTH CARE EDUCATION/TRAINING PROGRAM

## 2025-04-15 PROCEDURE — 1159F MED LIST DOCD IN RCRD: CPT | Performed by: STUDENT IN AN ORGANIZED HEALTH CARE EDUCATION/TRAINING PROGRAM

## 2025-04-15 PROCEDURE — 99213 OFFICE O/P EST LOW 20 MIN: CPT | Performed by: STUDENT IN AN ORGANIZED HEALTH CARE EDUCATION/TRAINING PROGRAM

## 2025-04-15 PROCEDURE — 3078F DIAST BP <80 MM HG: CPT | Performed by: STUDENT IN AN ORGANIZED HEALTH CARE EDUCATION/TRAINING PROGRAM

## 2025-04-15 PROCEDURE — 1160F RVW MEDS BY RX/DR IN RCRD: CPT | Performed by: STUDENT IN AN ORGANIZED HEALTH CARE EDUCATION/TRAINING PROGRAM

## 2025-04-15 PROCEDURE — G2211 COMPLEX E/M VISIT ADD ON: HCPCS | Performed by: STUDENT IN AN ORGANIZED HEALTH CARE EDUCATION/TRAINING PROGRAM

## 2025-04-15 PROCEDURE — 3074F SYST BP LT 130 MM HG: CPT | Performed by: STUDENT IN AN ORGANIZED HEALTH CARE EDUCATION/TRAINING PROGRAM

## 2025-04-15 PROCEDURE — 1036F TOBACCO NON-USER: CPT | Performed by: STUDENT IN AN ORGANIZED HEALTH CARE EDUCATION/TRAINING PROGRAM

## 2025-04-15 RX ORDER — BENAZEPRIL HYDROCHLORIDE 40 MG/1
40 TABLET ORAL DAILY
Qty: 30 TABLET | Refills: 0 | Status: SHIPPED | OUTPATIENT
Start: 2025-04-15

## 2025-04-15 RX ORDER — TOPIRAMATE SPINKLE 25 MG/1
50 CAPSULE ORAL 2 TIMES DAILY
Qty: 120 CAPSULE | Refills: 0 | Status: SHIPPED | OUTPATIENT
Start: 2025-04-15 | End: 2025-05-15

## 2025-04-15 ASSESSMENT — ENCOUNTER SYMPTOMS
GASTROINTESTINAL NEGATIVE: 1
MUSCULOSKELETAL NEGATIVE: 1
OCCASIONAL FEELINGS OF UNSTEADINESS: 1
PSYCHIATRIC NEGATIVE: 1
CONSTITUTIONAL NEGATIVE: 1
NEUROLOGICAL NEGATIVE: 1
CARDIOVASCULAR NEGATIVE: 1
RESPIRATORY NEGATIVE: 1
LOSS OF SENSATION IN FEET: 0

## 2025-04-15 ASSESSMENT — PAIN SCALES - GENERAL: PAINLEVEL_OUTOF10: 9

## 2025-04-15 NOTE — PROGRESS NOTES
Subjective   Patient ID: Mary Marsh is a 69 y.o. female.    Patient seen on routine follow-up.  In regards that she is overall doing well however still having intermittent issues with chest pain.  Was seen in the hospital for this and recommended outpatient follow-up however has not followed through.  Otherwise lab work overall well-controlled needs to see a pulmonologist denies any additional issues    Med Refill        Review of Systems   Constitutional: Negative.    HENT: Negative.     Respiratory: Negative.     Cardiovascular: Negative.    Gastrointestinal: Negative.    Musculoskeletal: Negative.    Neurological: Negative.    Psychiatric/Behavioral: Negative.         Objective Visit Vitals  /70   Pulse 69   Wt 80.7 kg (178 lb)   SpO2 94%   BMI 24.92 kg/m²   OB Status Postmenopausal   Smoking Status Former   BSA 2.01 m²      Physical Exam  Constitutional:       General: She is not in acute distress.     Appearance: She is not ill-appearing.   Eyes:      Pupils: Pupils are equal, round, and reactive to light.   Cardiovascular:      Rate and Rhythm: Normal rate and regular rhythm.      Pulses: Normal pulses.      Heart sounds: No murmur heard.  Pulmonary:      Effort: No respiratory distress.      Breath sounds: No wheezing.   Abdominal:      General: Abdomen is flat. Bowel sounds are normal. There is no distension.   Musculoskeletal:      Right lower leg: No edema.      Left lower leg: No edema.   Skin:     General: Skin is warm and dry.   Neurological:      Mental Status: She is alert. Mental status is at baseline.      Cranial Nerves: No cranial nerve deficit.      Motor: No weakness.   Psychiatric:         Mood and Affect: Mood normal.         Behavior: Behavior normal.         Assessment/Plan   Diagnoses and all orders for this visit:  Chest pain, unspecified type  -     Referral to Cardiology; Future  Encounter for general adult medical examination without abnormal findings  -     topiramate  (Topamax Sprinkle) 25 mg capsule; Take 2 capsules (50 mg) by mouth 2 times a day.  Hypertension, unspecified type  -     benazepril (Lotensin) 40 mg tablet; Take 1 tablet (40 mg) by mouth once daily.  Encounter for screening for malignant neoplasm of breast, unspecified screening modality  -     BI mammo bilateral screening tomosynthesis; Future  Encounter for screening mammogram for malignant neoplasm of breast  -     BI mammo bilateral screening tomosynthesis; Future  Simple chronic bronchitis (Multi)  -     Referral to Pulmonology; Future         Patient seen on follow-up     #Hallucinations  Concern for underlying hallucinations from psychiatry standpoint, CT imaging overall showing chronic disease no acute changes, recommend lab work today, suspect that hallucinations are secondary to underlying anxiety as depression versus metabolic or neurological pathology  Consider neuroevaluation  Discussed reinitiation of Remeron however she defers encouraged follow-up with psych referral placed      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 Bonilla, encourage follow-up as she has not done so  -Patient does not need to use inhalers.     DLD  -Continue fenofibrate  -no recent lipid panel, will order     Folliculitis, right armpit, improved  -Likely from ingrown hair. S/p 14 days of Bactrim.      Low back pain, stable  -uses walker to walk     Weight loss, stable  -on mirtazapine, weight stable, reports good appetite  -Extensive workup completed. CT of the A/P did show a nodule and had dedicated CT of the adrenal and followup with endocrine.        Mental health issues  -Follows with zay  psychiatrist who provides her fluoxetine, lamotrigine, lorazepam, mirtazapine stable at this time, depression screening done today which was negative     GERD  -Continue omeprazole      Migraines  -Uses topiramate for prophylaxis, has not had any major issues since being started on it 5 years ago         Health maintenance  -Colonoscopy in 2024,  repeat in 3-5 years  -Mammogram April/2024  -Encouraged age-appropriate vaccinations     RTC 6 months or sooner as needed

## 2025-04-20 DIAGNOSIS — E55.9 VITAMIN D DEFICIENCY: ICD-10-CM

## 2025-04-21 ENCOUNTER — APPOINTMENT (OUTPATIENT)
Dept: OPHTHALMOLOGY | Facility: CLINIC | Age: 70
End: 2025-04-21
Payer: COMMERCIAL

## 2025-04-21 RX ORDER — ERGOCALCIFEROL 1.25 MG/1
CAPSULE ORAL
Qty: 12 CAPSULE | Refills: 1 | Status: SHIPPED | OUTPATIENT
Start: 2025-04-27 | End: 2025-04-23 | Stop reason: SDUPTHER

## 2025-04-23 DIAGNOSIS — E55.9 VITAMIN D DEFICIENCY: ICD-10-CM

## 2025-04-23 RX ORDER — ERGOCALCIFEROL 1.25 MG/1
1.25 CAPSULE ORAL
Qty: 12 CAPSULE | Refills: 0 | Status: SHIPPED | OUTPATIENT
Start: 2025-04-27 | End: 2025-07-26

## 2025-05-07 ENCOUNTER — PATIENT OUTREACH (OUTPATIENT)
Dept: PRIMARY CARE | Facility: CLINIC | Age: 70
End: 2025-05-07
Payer: MEDICARE

## 2025-05-07 DIAGNOSIS — D50.9 IRON DEFICIENCY ANEMIA, UNSPECIFIED IRON DEFICIENCY ANEMIA TYPE: ICD-10-CM

## 2025-05-07 PROBLEM — M84.369A STRESS REACTION OF TIBIA: Status: RESOLVED | Noted: 2023-07-20 | Resolved: 2025-05-07

## 2025-05-07 PROBLEM — M21.069: Status: RESOLVED | Noted: 2023-11-27 | Resolved: 2025-05-07

## 2025-05-07 PROBLEM — R26.2 DISABILITY OF WALKING: Status: RESOLVED | Noted: 2023-07-20 | Resolved: 2025-05-07

## 2025-05-07 PROBLEM — R26.2 AMBULATORY DYSFUNCTION: Status: RESOLVED | Noted: 2023-07-20 | Resolved: 2025-05-07

## 2025-05-07 PROBLEM — Z28.310 UNVACCINATED FOR COVID-19: Status: RESOLVED | Noted: 2023-04-01 | Resolved: 2025-05-07

## 2025-05-07 PROBLEM — M84.369A STRESS FRACTURE OF TIBIA: Status: RESOLVED | Noted: 2023-03-30 | Resolved: 2025-05-07

## 2025-05-07 PROBLEM — S82.209A: Status: RESOLVED | Noted: 2023-11-29 | Resolved: 2025-05-07

## 2025-05-07 PROBLEM — R05.9 COUGH: Status: RESOLVED | Noted: 2023-07-20 | Resolved: 2025-05-07

## 2025-05-07 PROBLEM — L84 PRE-ULCERATIVE CALLUSES: Status: RESOLVED | Noted: 2017-09-28 | Resolved: 2025-05-07

## 2025-05-07 PROBLEM — M21.069 ACQUIRED GENU VALGUM: Status: RESOLVED | Noted: 2023-11-27 | Resolved: 2025-05-07

## 2025-05-07 PROBLEM — L29.9 PRURITUS: Status: RESOLVED | Noted: 2023-09-07 | Resolved: 2025-05-07

## 2025-05-07 PROBLEM — M20.41 HAMMERTOE OF RIGHT FOOT: Status: RESOLVED | Noted: 2017-09-28 | Resolved: 2025-05-07
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
## 2025-05-07 PROBLEM — E55.9 VITAMIN D DEFICIENCY, UNSPECIFIED: Status: RESOLVED | Noted: 2023-04-01 | Resolved: 2025-05-07

## 2025-05-07 PROBLEM — E66.3 OVERWEIGHT WITH BODY MASS INDEX (BMI) 25.0-29.9: Status: RESOLVED | Noted: 2024-03-04 | Resolved: 2025-05-07

## 2025-05-07 PROBLEM — F43.0 STRESS REACTION: Status: RESOLVED | Noted: 2023-07-20 | Resolved: 2025-05-07

## 2025-05-07 PROBLEM — L84 CALLUS: Status: RESOLVED | Noted: 2017-09-28 | Resolved: 2025-05-07

## 2025-05-07 PROBLEM — K21.9 GASTRO-ESOPHAGEAL REFLUX DISEASE WITHOUT ESOPHAGITIS: Status: RESOLVED | Noted: 2023-04-01 | Resolved: 2025-05-07

## 2025-05-07 PROBLEM — M84.361A STRESS FRACTURE OF RIGHT TIBIA: Status: RESOLVED | Noted: 2023-07-20 | Resolved: 2025-05-07

## 2025-05-07 PROBLEM — K13.70 ORAL LESION: Status: RESOLVED | Noted: 2023-07-20 | Resolved: 2025-05-07

## 2025-05-07 PROBLEM — S82.209A TIBIA FRACTURE: Status: RESOLVED | Noted: 2023-07-20 | Resolved: 2025-05-07

## 2025-05-07 PROBLEM — S82.231P: Status: RESOLVED | Noted: 2023-12-20 | Resolved: 2025-05-07

## 2025-05-07 PROBLEM — K13.79 MOUTH SORE: Status: RESOLVED | Noted: 2023-07-20 | Resolved: 2025-05-07

## 2025-05-07 PROBLEM — F43.0 ACUTE STRESS DISORDER: Status: RESOLVED | Noted: 2023-04-05 | Resolved: 2025-05-07

## 2025-05-07 PROBLEM — D12.6 ADENOMA OF COLON: Status: RESOLVED | Noted: 2023-07-20 | Resolved: 2025-05-07

## 2025-05-07 PROBLEM — S82.101D CLOSED FRACTURE OF PROXIMAL END OF RIGHT TIBIA WITH ROUTINE HEALING: Status: RESOLVED | Noted: 2023-04-05 | Resolved: 2025-05-07

## 2025-05-07 PROBLEM — R26.2 DIFFICULTY IN WALKING, NOT ELSEWHERE CLASSIFIED: Status: RESOLVED | Noted: 2023-04-01 | Resolved: 2025-05-07

## 2025-05-07 PROBLEM — S82.201A: Status: RESOLVED | Noted: 2023-11-29 | Resolved: 2025-05-07

## 2025-05-07 PROBLEM — U07.1 COVID-19: Status: RESOLVED | Noted: 2023-05-05 | Resolved: 2025-05-07

## 2025-05-07 PROBLEM — L73.9 FOLLICULITIS: Status: RESOLVED | Noted: 2023-07-20 | Resolved: 2025-05-07

## 2025-05-07 PROBLEM — M20.40 HAMMER TOE: Status: RESOLVED | Noted: 2017-09-28 | Resolved: 2025-05-07

## 2025-05-07 RX ORDER — FERROUS SULFATE 325(65) MG
TABLET ORAL
Qty: 30 TABLET | Refills: 0 | Status: SHIPPED | OUTPATIENT
Start: 2025-05-07

## 2025-05-07 NOTE — PROGRESS NOTES
Successful outreach to patient regarding hospitalization as patient continues TCM program.   At time of outreach call the patient feels as if their condition has not improved much since initial visit with PCP or specialist.  Questions or concerns addressed at this time with patient.   Provided contact information to patient if any further non-emergent needs arise.   Mary is still struggling with chest pressure. Has been seen by PCP and did get appt for Pulmonary next month.

## 2025-05-08 DIAGNOSIS — K21.9 GASTROESOPHAGEAL REFLUX DISEASE, UNSPECIFIED WHETHER ESOPHAGITIS PRESENT: ICD-10-CM

## 2025-05-08 DIAGNOSIS — I10 HYPERTENSION, UNSPECIFIED TYPE: ICD-10-CM

## 2025-05-08 RX ORDER — BENAZEPRIL HYDROCHLORIDE 40 MG/1
40 TABLET ORAL DAILY
Qty: 90 TABLET | Refills: 1 | Status: SHIPPED | OUTPATIENT
Start: 2025-05-08

## 2025-05-08 RX ORDER — PANTOPRAZOLE SODIUM 40 MG/1
40 TABLET, DELAYED RELEASE ORAL DAILY
Qty: 90 TABLET | Refills: 0 | Status: SHIPPED | OUTPATIENT
Start: 2025-05-08

## 2025-05-14 DIAGNOSIS — Z00.00 ENCOUNTER FOR GENERAL ADULT MEDICAL EXAMINATION WITHOUT ABNORMAL FINDINGS: ICD-10-CM

## 2025-05-14 RX ORDER — TOPIRAMATE SPINKLE 25 MG/1
50 CAPSULE ORAL 2 TIMES DAILY
Qty: 120 CAPSULE | Refills: 0 | Status: SHIPPED | OUTPATIENT
Start: 2025-05-14 | End: 2025-06-13

## 2025-05-29 ENCOUNTER — OFFICE VISIT (OUTPATIENT)
Dept: CARDIOLOGY | Facility: CLINIC | Age: 70
End: 2025-05-29
Payer: MEDICARE

## 2025-05-29 VITALS
HEART RATE: 68 BPM | BODY MASS INDEX: 25.2 KG/M2 | WEIGHT: 180 LBS | OXYGEN SATURATION: 98 % | DIASTOLIC BLOOD PRESSURE: 74 MMHG | SYSTOLIC BLOOD PRESSURE: 124 MMHG

## 2025-05-29 DIAGNOSIS — K21.9 GASTROESOPHAGEAL REFLUX DISEASE WITHOUT ESOPHAGITIS: ICD-10-CM

## 2025-05-29 DIAGNOSIS — Z86.73 HISTORY OF CEREBROVASCULAR ACCIDENT: ICD-10-CM

## 2025-05-29 DIAGNOSIS — E78.5 HYPERLIPIDEMIA, UNSPECIFIED HYPERLIPIDEMIA TYPE: ICD-10-CM

## 2025-05-29 DIAGNOSIS — R07.9 CHEST PAIN, UNSPECIFIED TYPE: Primary | ICD-10-CM

## 2025-05-29 DIAGNOSIS — J44.9 CHRONIC OBSTRUCTIVE PULMONARY DISEASE, UNSPECIFIED COPD TYPE (MULTI): ICD-10-CM

## 2025-05-29 LAB
ATRIAL RATE: 64 BPM
P AXIS: 65 DEGREES
P OFFSET: 207 MS
P ONSET: 144 MS
PR INTERVAL: 152 MS
Q ONSET: 220 MS
QRS COUNT: 11 BEATS
QRS DURATION: 100 MS
QT INTERVAL: 430 MS
QTC CALCULATION(BAZETT): 443 MS
QTC FREDERICIA: 439 MS
R AXIS: 47 DEGREES
T AXIS: 58 DEGREES
T OFFSET: 435 MS
VENTRICULAR RATE: 64 BPM

## 2025-05-29 PROCEDURE — 99214 OFFICE O/P EST MOD 30 MIN: CPT | Performed by: INTERNAL MEDICINE

## 2025-05-29 PROCEDURE — 93005 ELECTROCARDIOGRAM TRACING: CPT | Performed by: INTERNAL MEDICINE

## 2025-05-29 RX ORDER — REGADENOSON 0.08 MG/ML
0.4 INJECTION, SOLUTION INTRAVENOUS ONCE
Status: SHIPPED | OUTPATIENT
Start: 2025-05-29

## 2025-05-29 NOTE — PROGRESS NOTES
Subjective  Mary Marsh  is a 69 y.o. year old female who presents for evaluation of chest pain seen by Dr. Tapia 3/25/25 while in hospital for chest pain.  Seen previously by myself 4/4/19 with 3/7/19 Lexiscan negative for ischemia    Blood pressure 124/74, pulse 68, weight 81.6 kg (180 lb), SpO2 98%.   Amoxicillin, Egg, Lactose, and Nitrofurantoin monohyd/m-cryst  Medical History[1]  Surgical History[2]  Family History[3]  @SOC    Current Medications[4]     ROS  Review of Systems   All other systems reviewed and are negative.      Physical Exam  Physical Exam  Constitutional:       Appearance: Normal appearance.   HENT:      Head: Normocephalic and atraumatic.   Pulmonary:      Effort: Pulmonary effort is normal.      Comments: Decreased breath sounds throughout  Abdominal:      General: Abdomen is flat.   Musculoskeletal:      Right lower leg: No edema.      Left lower leg: No edema.   Skin:     General: Skin is warm and dry.   Neurological:      General: No focal deficit present.      Mental Status: She is alert and oriented to person, place, and time.   Psychiatric:         Mood and Affect: Mood normal.         Behavior: Behavior normal.          EKG  Encounter Date: 05/29/25   ECG 12 lead (Clinic Performed)   Result Value    Ventricular Rate 64    Atrial Rate 64    IA Interval 152    QRS Duration 100    QT Interval 430    QTC Calculation(Bazett) 443    P Axis 65    R Axis 47    T Axis 58    QRS Count 11    Q Onset 220    P Onset 144    P Offset 207    T Offset 435    QTC Fredericia 439    Narrative    Normal sinus rhythm  T wave abnormality, consider anterior ischemia  Abnormal ECG  When compared with ECG of 24-MAR-2025 04:11,  No significant change was found  Confirmed by Stanislav Pinon ([id number]) on 5/29/2025 4:11:18 PM       Problem List Items Addressed This Visit       Chest pain - Primary    5/29/25 EKG NSR with possible anterior ischemia         Relevant Medications    regadenoson (Lexiscan)  injection 0.4 mg    Other Relevant Orders    ECG 12 lead (Clinic Performed) (Completed)    Follow Up In Cardiology    Follow Up In Cardiology    Nuclear Stress Test    Hyperlipidemia    Gastroesophageal reflux disease    History of cerebrovascular accident    Chronic obstructive pulmonary disease (Multi)         Lexiscan with office visit a few days later      Stanislav Pinon MD        [1]   Past Medical History:  Diagnosis Date    Adrenal nodule     Anemia     Arthritis     Body mass index (BMI) 27.0-27.9, adult     BMI 27.0-27.9,adult    Cataract     Cerebral vascular accident (Multi)     Closed left hip fracture, initial encounter     s/p IMN    COPD (chronic obstructive pulmonary disease) (Multi)     Depression     Emphysema of lung (Multi)     Generalized pruritus     GERD (gastroesophageal reflux disease)     Hypertension     Irritable bowel syndrome     Migraines     Peripheral neuropathy     Right tibial fracture 2023    Urinary incontinence     Vision loss    [2]   Past Surgical History:  Procedure Laterality Date    ANKLE SURGERY      APPENDECTOMY      BREAST BIOPSY       SECTION, LOW TRANSVERSE      CHOLECYSTECTOMY      COLONOSCOPY      KNEE SURGERY      ORIF TIBIA FRACTURE     [3]   Family History  Problem Relation Name Age of Onset    Diabetes Mother      Hypertension Mother      Lung cancer Father          tobacco use    Cancer Sister      Lung cancer Brother          tobacco use    Breast cancer Mother's Sister     [4]   Current Outpatient Medications   Medication Sig Dispense Refill    amLODIPine (Norvasc) 10 mg tablet TAKE 1 TABLET BY MOUTH EVERY DAY 90 tablet 1    aspirin 81 mg EC tablet Take 1 tablet (81 mg) by mouth once daily.      benazepril (Lotensin) 40 mg tablet TAKE 1 TABLET BY MOUTH EVERY DAY 90 tablet 1    calcium carbonate-vitamin D3 600 mg-5 mcg (200 unit) tablet Take 1 tablet by mouth once daily.      dicyclomine (Bentyl) 10 mg capsule TAKE 1 CAPSULE (10 MG) BY  MOUTH EVERY 6 HOURS IF NEEDED 360 capsule 1    ergocalciferol (Vitamin D-2) 1250 mcg (50,000 units) capsule Take 1 capsule (1.25 mg) by mouth 1 (one) time per week. 12 capsule 0    fenofibrate (Triglide) 160 mg tablet TAKE 1 TABLET BY MOUTH EVERY DAY 90 tablet 0    ferrous sulfate 325 mg (65 mg elemental) tablet TAKE 1 TABLET BY MOUTH EVERY DAY WITH FOOD NEED APPT FOR FURTHER REFILLS 30 tablet 0    FLUoxetine (PROzac) 20 mg capsule Take 2 capsules (40 mg) by mouth once daily.      Klor-Con M20 20 mEq ER tablet TAKE 1 TABLET BY MOUTH EVERY DAY 90 tablet 1    lactase (LACTAID ORAL) Take 1 tablet by mouth if needed.      lamoTRIgine (LaMICtal) 150 mg tablet Take 1 tablet (150 mg) by mouth once daily at bedtime.      LORazepam (Ativan) 1 mg tablet Take 1 tablet (1 mg) by mouth 2 times a day.      mirtazapine (Remeron) 7.5 mg tablet Take 1 tablet (7.5 mg) by mouth once daily at bedtime.      multivitamin with minerals (multivitamin) tablet Take 1 tablet by mouth once daily.      pantoprazole (ProtoNix) 40 mg EC tablet Take 1 tablet (40 mg) by mouth once daily. 90 tablet 0    topiramate (Topamax Sprinkle) 25 mg capsule TAKE 2 CAPSULES (50 MG) BY MOUTH 2 TIMES A DAY. 120 capsule 0     Current Facility-Administered Medications   Medication Dose Route Frequency Provider Last Rate Last Admin    regadenoson (Lexiscan) injection 0.4 mg  0.4 mg intravenous Once Stanislav Pinon MD

## 2025-05-30 NOTE — PROGRESS NOTES
Patient: Mary Marsh    [id number]  : 1955 -- AGE 69 y.o.    Provider: Susi CHAMORRO CNP     Location [facility]   Service Date: 25            [facility]  New Visit Note      HISTORY OF PRESENT ILLNESS     The patient's referring provider is: Anupam Vo DO    HISTORY OF PRESENT ILLNESS   Mary Marsh is a 69 y.o. female with a history of COPD, hypertension, anxiety, who is a former smoker (18 pack years), who presents to a [facility] for an initial evaluation for COPD.    I have independently interviewed and examined the patient in the office and reviewed available records.    Current History    On today's visit, the patient reports feeling chest heaviness for over the past 2 months.  She went to the emergency room for chest heaviness, states she feels like someone sitting on her chest.  She was referred to cardiology.  Reports anxiety, takes Ativan daily with little improvement..  Has cough first thing in the morning and right after she lays down in bed, sometimes wakes up in the middle the night coughing.  Reports occasional wheezing and dyspnea on exertion.  Denies shortness of breath at rest. Has seasonal allergies, takes Zyrtec and eyedrops as needed.  Takes pantoprazole for GERD daily with good control  Reports a history of COPD was seeing a pulmonologist over 5 years ago.  States she believes she was given albuterol that did not help.    Previous pulmonary history: COPD    Inhalers/nebulized medications: albuterol    Hospitalization History: He has not been hospitalized over the last year for breathing related problem.    Sleep history: Denies snoring, apnea, feeling tired during the day or taking naps during the day.     ALLERGIES AND MEDICATIONS     ALLERGIES  Allergies[1]    MEDICATIONS  Current Medications[2]      PAST HISTORY     PAST MEDICAL  HISTORY  Hypertension  GERD  Anxiety/Depression    PAST SURGICAL HISTORY  Surgical History[3]    IMMUNIZATION HISTORY  Immunization History   Administered Date(s) Administered    Influenza, injectable, quadrivalent 2019       SOCIAL HISTORY  Tobacco Smokin's- 53 y/o - 1-1.5 ppd - 18 pack years  Smokeless Tobacco/Vaping: none  Illicit drugs: none  Alcohol consumption: none  Pets: cat  Living situation: live at home alone    OCCUPATIONAL/ENVIRONMENTAL HISTORY  Occupation:   No known exposure to asbestos, silica, beryllium or inhaled metals.  No exposure to birds or exotic animals.    FAMILY HISTORY    Father - emphsyema- lung cancer  Brother-  emphysema - lung cancer  Lung family history of cancer.  No family history of autoimmune disorders.    REVIEW OF SYSTEMS     REVIEW OF SYSTEMS  Review of Systems    Constitutional: No fever, no chills, no night sweats.    Eyes: No double vision, no floaters, no dry eyes.   ENT: See HPI.   Neck: No neck stiffness.  Cardiovascular: No sharp chest pain, no heart racing, no leg swelling.  Respiratory: as noted in HPI.   Gastrointestinal: No nausea, no vomiting, no diarrhea.   Musculoskeletal: No joint pain, no back pain.   Integumentary: No rashes or sores.  Neurological: No dizziness, no headaches. Sleeping well.  Psychiatric: No mood changes.   Endocrine: No hot flashes, no cold intolerance, weight is stable.  Hematologic: No easy bruising or bleeding.    PHYSICAL EXAM     VITAL SIGNS:   Vitals:    25 0917   BP: 110/90   Pulse: 79   Resp: 18   SpO2: 94%          CURRENT WEIGHT: Body mass index is 24.52 kg/m².    PREVIOUS WEIGHTS:  Wt Readings from Last 3 Encounters:   25 81.6 kg (180 lb)   04/15/25 80.7 kg (178 lb)   25 90.7 kg (200 lb)       Physical Exam    Constitutional: General appearance: Alert and oriented.  No acute distress. Well developed, well nourished.  Head and face: Symmetric  ENT: external inspection of ear and nose normal. No  intranasal polyps. No oropharyngeal exudates.    Oropharynx: normal   Neck: supple, no lymphadenopathy  Pulmonary: Chest is normal. No increased work of breathing or signs of respiratory distress. Clear to auscultation bilaterally - no crackles, wheezing, or rhonchi.   Cardiovascular: Heart rate and rhythm normal. Normal S1, S2 - no murmurs, gallops, or pericardial rub.   Abdomen: Soft, non tender, +BS  Extremities: No edema. No clubbing or cyanosis of the fingernails.    Neurologic: Moves all four extremities   MSK: Normal movements of extremities. Gait normal   Psychiatric: Intact judgement and insight.    RESULTS/DATA     Pulmonary Function Test Results         Media Information      Document Information      Chest Radiograph     XR chest 1 view 03/24/2025    Narrative  STUDY:  Chest Radiograph;  3/24/2025 5:04 AM  INDICATION:  Pain.  COMPARISON:  CXR 3/30/2023.  ACCESSION NUMBER(S):  [id number]  ORDERING CLINICIAN:  ORLIN SUNG  TECHNIQUE:  Frontal chest was obtained at 05:02 hours.  FINDINGS:  CARDIOMEDIASTINAL SILHOUETTE:  Cardiomediastinal silhouette is normal in size and configuration.    LUNGS:  Lungs are clear.    ABDOMEN:  No remarkable upper abdominal findings.    BONES:  No acute osseous changes.    Impression  No acute chest disease.  Signed by Prem Hutson MD      Chest CT Scan       CT ABDOMEN PELVIS W IV CONTRAST  Status: Final result     Signed by    Signed Time Phone Pager   Alfonso Cobos MD 6/28/2020 15:35 [phone]     Exam Information    Status Exam Begun Exam Ended   Final 6/25/2020 15:05 6/25/2020 15:57     Study Result    Narrative & Impression   MRN: [id number]  Patient Name: RADHA RAY     STUDY:  CT ABDOMEN AND PELVIS W IV CONTRAST;  6/25/2020 3:57 pm     INDICATION:  Abnormal weight loss.     COMPARISON:  CT angio chest study from 04/22/2018     ACCESSION NUMBER(S):  [id number]     ORDERING CLINICIAN:  EDEN RODRÍGUEZ     TECHNIQUE:  CT of the abdomen and pelvis  was performed.  Standard contiguous  axial images were obtained at 3 mm slice thickness through the  abdomen and pelvis. Coronal and sagittal reconstructions at 3 mm  slice thickness were performed.     100 ml of contrast Isovue 370 were administered intravenously without  immediate complication.     FINDINGS:  LOWER CHEST:  There is minimal bibasilar atelectasis identified in the bilateral  lung bases without evidence of pleural effusion or focal  consolidation. There is a 3 mm solid pulmonary nodule in the left  lower lobe best seen on axial image 18 of 141. There is a 2 mm solid  pulmonary nodule in the left lower lobe best seen on axial image 26  of 141. the heart is normal in size without pericardial effusion.  There is a small hiatal hernia.     ABDOMEN:     LIVER:  The liver is normal in size. Along the falciform ligament there is an  ill-defined area of hypodensity measuring approximately 19 x 18 mm  likely representing focal fatty infiltration.     BILE DUCTS:  The intrahepatic and extrahepatic ducts are not dilated.     GALLBLADDER:  The gallbladder is surgically absent.     PANCREAS:  The pancreas appears unremarkable without evidence of ductal  dilatation or masses.     SPLEEN:  The spleen is normal in size. An accessory splenule is present.     ADRENAL GLANDS:  There is a hyperdense nodular masslike appearance of the left adrenal  gland measuring 2.4 x 2.1 x 3.1 cm. The right adrenal gland appears  unremarkable.     KIDNEYS AND URETERS:  The kidneys are normal in size and enhance symmetrically. There is  are bilateral subcentimeter hypodensities identified which are too  small to characterize but likely represent renal cysts. There is a 4  mm nonobstructing calculi identified in the inferior pole of the  right kidney. There is no hydroureteronephrosis.     PELVIS:     BLADDER:  The urinary bladder appears normal without abnormal wall thickening.     REPRODUCTIVE ORGANS:  No pelvic masses.      BOWEL:  The stomach is unremarkable. Anastomosis secondary to prior bowel  surgery is noted in the right hemiabdomen. Small bowel loops proximal  to the anastomosis and large bowel distal to anastomosis is  fluid-filled with the associated adjacent mild hyperemia and some  stranding at the anastomotic site, best seen on image 68 of 146. The  appendix is not definitely visualized. There is however no pericecal  stranding or fluid.     VESSELS:  There is no aneurysmal dilatation of the abdominal aorta. The IVC  appears normal. There are moderate atherosclerotic calcifications of  the abdominal aorta and its branches.     PERITONEUM/RETROPERITONEUM/LYMPH NODES:  There is no free or loculated fluid collection, no free  intraperitoneal air. The retroperitoneum appears normal.  No  abdominopelvic lymphadenopathy is present.     BONES AND ABDOMINAL WALL:  Diffuse faint sclerosis of the visualized bones with the significant  heterogeneity and scattered lucencies seen throughout the pelvic  bones.. Postsurgical changes are identified in the left hip from  prior intramedullary janna and gamma nail placement. There is a small  fat containing umbilical hernia.     IMPRESSION:  1.  Hyperdense nodular masslike appearance of the left adrenal gland.  Part of the enlarged left adrenal gland was visualized on the  04/022/2018 study and is unchanged, however the most inferior  enhancing nodular component was not the clearly imaged on the prior  study. A dedicated adrenal washout CT protocol study can be obtained  as per clinical need.  2. Irregular hypodense area within the liver along the falciform  ligament, findings may represent focal fatty infiltration however  metastatic disease is not entirely excluded, further evaluation with  MRI may be obtained.  3. Bowel anastomosis in the right hemiabdomen with the findings  suggestive of a enterocolitis as described above. As per clinical  need further evaluation with colonoscopy and/or  "stool analysis should  be considered.  4. Diffuse background sclerosis of the visualized bone with the  scattered lucencies throughout the pelvic bones. This could be  related to chronic anemia with the superimposed osteopenic changes.  However other possibility is of the plasma cells neoplasm(MULTIPLE  MYELOMA). As per clinical need correlation with the SPEP is strongly  recommended.  5. 4 mm nonobstructing calculi within the inferior pole the right  kidney.  6. Subcentimeter pulmonary nodules in the left lower lobe.  7. Small hiatal hernia.        A Yellow Alert message was sent to the referring physician Dr. EDEN RODRÍGUEZ through the [employer] system at 1:07 pm on 6/28/2020 by  Dr.Patrick Loving     I personally reviewed the images/study and I agree with the findings  as stated. This study was interpreted at [place], [place].       Echocardiogram        Transthoracic Echo (TTE) Complete    Height: 1.8 m (5' 10.87\")   Weight: 90.7 kg (200 lb)   Blood Pressure: Not recorded    Date of Study: 3/24/25   Ordering Provider: Daxa Prince MD    Clinical Indications: chest pain       Reading Physicians  Performing Staff   Cardiology: Taz Tapia,      Tech: Marilia James, RUST         PACS Images     Show images for Transthoracic Echo (TTE) Complete  Interpretation Summary       [facility], [address] [place], [place] [address]            Tel [phone] and Fax [phone]     TRANSTHORACIC ECHOCARDIOGRAM REPORT        Patient Name:       RADHA RAY  Reading Physician:    [id number] Taz Tapia DO  Study Date:         3/24/2025           Ordering Provider:    [id number] EDEN RODRÍGUEZ  MRN/PID:            [id number]            Fellow:  Accession#:         [id number]        Nurse:  Date of Birth/Age:  1955 / 69      " Sonographer:          Marilia way                                     MIKE  Gender assigned at  F                   Additional Staff:  Birth:  Height:             177.80 cm           Admit Date:           3/24/2025  Weight:             90.72 kg            Admission Status:     Inpatient -                                                                Routine  BSA / BMI:          2.09 m2 / 28.70     Encounter#:           [id number]                      kg/m2  Blood Pressure:     108/59 mmHg         Department Location:  [facility]     Study Type:    TRANSTHORACIC ECHO (TTE) COMPLETE  Diagnosis/ICD: Chest pain, unspecified-R07.9  Indication:    CP SOB  CPT Code:      Echo Complete w Full [id number]     Patient History:  Pertinent History: HTN and Hyperlipidemia.     Study Detail: The following Echo studies were performed: 2D, M-Mode, Doppler and                color flow. Technically challenging study due to body habitus.        PHYSICIAN INTERPRETATION:  Left Ventricle: The left ventricular systolic function is normal, with a Bowen's biplane calculated ejection fraction of 69%. There is mild concentric left ventricular hypertrophy. There are no regional left ventricular wall motion abnormalities. The left ventricular cavity size is normal. There is mildly increased septal and mildly increased posterior left ventricular wall thickness. There is left ventricular concentric remodeling. Spectral Doppler shows a Grade I (impaired relaxation pattern) of left ventricular diastolic filling with normal left atrial filling pressure.  Left Atrium: The left atrial size is normal.  Right Ventricle: The right ventricle is normal in size. There is normal right ventricular global systolic function.  Right Atrium: The right atrium is normal in size.  Aortic Valve: The aortic valve is probably trileaflet. There is no evidence of aortic valve stenosis. There is no evidence of aortic valve  regurgitation. The peak instantaneous gradient of the aortic valve is 11 mmHg.  Mitral Valve: The mitral valve is mildly thickened. There is mild mitral annular calcification. There is no evidence of mitral valve regurgitation.  Tricuspid Valve: The tricuspid valve is structurally normal. There is trace tricuspid regurgitation. The Doppler estimated RVSP is within normal limits at 24.9 mmHg.  Pulmonic Valve: The pulmonic valve is structurally normal. There is no indication of pulmonic valve regurgitation.  Pericardium: There is no pericardial effusion noted.  Aorta: The aortic root is normal.  Systemic Veins: The inferior vena cava appears normal in size.        CONCLUSIONS:   1. The left ventricular systolic function is normal, with a Bowen's biplane calculated ejection fraction of 69%.   2. Spectral Doppler shows a Grade I (impaired relaxation pattern) of left ventricular diastolic filling with normal left atrial filling pressure.   3. Normal sized right ventricle.   4. Right ventricular systolic pressure is within normal limits.   5. Aortic valve stenosis is not present.     QUANTITATIVE DATA SUMMARY:     2D MEASUREMENTS:          Normal Ranges:  Ao Root d:       3.20 cm  (2.0-3.7cm)  LAs:             3.50 cm  (2.7-4.0cm)  IVSd:            1.06 cm  (0.6-1.1cm)  LVPWd:           1.04 cm  (0.6-1.1cm)  LVIDd:           3.76 cm  (3.9-5.9cm)  LVIDs:           2.68 cm  LV Mass Index:   59 g/m2  LVEDV Index:     24 ml/m2  LV % FS          28.7 %        LEFT ATRIUM:                  Normal Ranges:  LA Vol A4C:        41.0 ml    (22+/-6mL/m2)  LA Vol A2C:        30.1 ml  LA Vol BP:         39.0 ml  LA Vol Index A4C:  19.6ml/m2  LA Vol Index A2C:  14.4 ml/m2  LA Vol Index BP:   18.7 ml/m2  LA Area A4C:       14.4 cm2  LA Area A2C:       13.7 cm2  LA Major Axis A4C: 4.3 cm  LA Major Axis A2C: 5.3 cm  LA Vol A4C:        38.4 ml  LA Vol A2C:        28.6 ml  LA Vol Index BSA:  16.0 ml/m2        RIGHT ATRIUM:         Normal  Ranges:  RA Area A4C:  9.3 cm2        M-MODE MEASUREMENTS:         Normal Ranges:  Ao Root:             3.70 cm (2.0-3.7cm)  LAs:                 3.20 cm (2.7-4.0cm)        AORTA MEASUREMENTS:         Normal Ranges:  Asc Ao, d:          3.50 cm (2.1-3.4cm)        LV SYSTOLIC FUNCTION:                       Normal Ranges:  EF-A4C View:    72 % (>=55%)  EF-A2C View:    69 %  EF-Biplane:     69 %  LV EF Reported: 69 %        LV DIASTOLIC FUNCTION:             Normal Ranges:  MV Peak A:             0.78 m/s    (0.42-0.7 m/s)  MV e'                  0.094 m/s   (>8.0)  MV lateral e'          0.10 m/s  MV medial e'           0.09 m/s  PulmV Sys Augusto:         48.20 cm/s  PulmV Upton Augusto:        36.70 cm/s  PulmV S/D Augusto:         1.30  PulmV A Revs Augusto:      36.20 cm/s  PulmV A Revs Dur:      109.00 msec        MITRAL VALVE:          Normal Ranges:  MV DT:        243 msec (150-240msec)        AORTIC VALVE:            Normal Ranges:  AoV Vmax:      1.65 m/s  (<=1.7m/s)  AoV Peak PG:   10.9 mmHg (<20mmHg)  LVOT Max Augusto:  1.61 m/s  (<=1.1m/s)  LVOT VTI:      32.20 cm  LVOT Diameter: 2.00 cm   (1.8-2.4cm)  AoV Area,Vmax: 3.07 cm2  (2.5-4.5cm2)        RIGHT VENTRICLE:  RV Basal 3.65 cm  RV Mid   2.75 cm  RV Major 5.3 cm  TAPSE:   21.1 mm  RV s'    0.13 m/s        TRICUSPID VALVE/RVSP:          Normal Ranges:  Peak TR Velocity:     2.34 m/s  RV Syst Pressure:     25 mmHg  (< 30mmHg)        PULMONARY VEINS:  PulmV A Revs Dur: 109.00 msec  PulmV A Revs Augusto: 36.20 cm/s  PulmV Upton Augusto:   36.70 cm/s  PulmV S/D Augusto:    1.30  PulmV Sys Augusto:    48.20 cm/s        [id number] Taz Tapia DO  Electronically signed on 3/24/2025 at 11:56:40 AM           ** Final **       Labwork     Lab Results   Component Value Date    WBC 3.7 (L) 03/25/2025    HGB 11.6 (L) 03/25/2025    HCT 37.1 03/25/2025    MCV 87 03/25/2025     03/25/2025      Lab Results   Component Value Date    GLUCOSE 91 03/25/2025    CALCIUM 9.1 03/25/2025     03/25/2025  "   K 3.7 03/25/2025    CO2 22 03/25/2025     03/25/2025    BUN 10 03/25/2025    CREATININE 1.03 03/25/2025      Lab Results   Component Value Date    ALT 12 03/24/2025    AST 18 03/24/2025    ALKPHOS 88 03/24/2025    BILITOT 0.3 03/24/2025        Protime   Date/Time Value Ref Range Status   11/10/2020 12:07 PM 12.1 10.1 - 13.3 sec Final     INR   Date/Time Value Ref Range Status   11/10/2020 12:07 PM 1.0 0.9 - 1.1 Final       No results found for: \"ICIGE\", \"IGE\", \"ICA04\", \"ASPFU\", \"IGG\", \"IGA\", \"IGM\"    Peripheral Eosinophil Count/Percentage:   Eosinophils Absolute (x10*3/uL)   Date Value   03/24/2025 0.13     Eosinophils % (%)   Date Value   03/24/2025 2.9       ASSESSMENT/PLAN   Ms. Marsh is a 69 y.o. female, with a history of COPD, hypertension, anxiety, who is a former smoker (18 pack years), who presents to a [facility] for an initial evaluation for COPD.    PFTs 3/2019: ratio is 0.63, FEV1 54%, FVC 66%, with borderline improvement in bronchodilators. DLCO 55%. Moderate obstruction    Problem List and Orders  Problem List Items Addressed This Visit       Shortness of breath - Primary    Relevant Medications    albuterol (Ventolin HFA) 90 mcg/actuation inhaler    Other Relevant Orders    Complete Pulmonary Function Test Pre/Post Bronchodilator (Spirometry Pre/Post/DLCO/Lung Volumes)    Exhaled Nitric Oxide (FeNO)    Follow Up In Pulmonology    Chronic obstructive pulmonary disease (Multi)     Other Visit Diagnoses         Chest heaviness        Relevant Medications    albuterol (Ventolin HFA) 90 mcg/actuation inhaler    Other Relevant Orders    Complete Pulmonary Function Test Pre/Post Bronchodilator (Spirometry Pre/Post/DLCO/Lung Volumes)    Exhaled Nitric Oxide (FeNO)    Follow Up In Pulmonology            Assessment and Plan / Recommendations:  Problem List Items Addressed This Visit    None    COPD/ chest heaviness/ Shortness of breath   - will get PFTs  - START " albuterol HFA every 4-6 hours as needed for shortness of breat    Follow up in 1 month (after PFTs) or sooner if needed.    If you have any questions please call the office [phone]    Thank you for visiting the Pulmonary clinic today!   Susi Kim CNP  [phone]         [1]   Allergies  Allergen Reactions    Amoxicillin Swelling    Egg Diarrhea and Headache    Lactose Diarrhea and GI Upset    Nitrofurantoin Monohyd/M-Cryst Nausea/vomiting   [2]   Current Outpatient Medications   Medication Sig Dispense Refill    amLODIPine (Norvasc) 10 mg tablet TAKE 1 TABLET BY MOUTH EVERY DAY 90 tablet 1    aspirin 81 mg EC tablet Take 1 tablet (81 mg) by mouth once daily.      benazepril (Lotensin) 40 mg tablet TAKE 1 TABLET BY MOUTH EVERY DAY 90 tablet 1    calcium carbonate-vitamin D3 600 mg-5 mcg (200 unit) tablet Take 1 tablet by mouth once daily.      dicyclomine (Bentyl) 10 mg capsule TAKE 1 CAPSULE (10 MG) BY MOUTH EVERY 6 HOURS IF NEEDED 360 capsule 1    ergocalciferol (Vitamin D-2) 1250 mcg (50,000 units) capsule Take 1 capsule (1.25 mg) by mouth 1 (one) time per week. 12 capsule 0    fenofibrate (Triglide) 160 mg tablet TAKE 1 TABLET BY MOUTH EVERY DAY 90 tablet 0    ferrous sulfate 325 mg (65 mg elemental) tablet TAKE 1 TABLET BY MOUTH EVERY DAY WITH FOOD NEED APPT FOR FURTHER REFILLS 30 tablet 0    FLUoxetine (PROzac) 20 mg capsule Take 2 capsules (40 mg) by mouth once daily.      Klor-Con M20 20 mEq ER tablet TAKE 1 TABLET BY MOUTH EVERY DAY 90 tablet 1    lactase (LACTAID ORAL) Take 1 tablet by mouth if needed.      lamoTRIgine (LaMICtal) 150 mg tablet Take 1 tablet (150 mg) by mouth once daily at bedtime.      LORazepam (Ativan) 1 mg tablet Take 1 tablet (1 mg) by mouth 2 times a day.      mirtazapine (Remeron) 7.5 mg tablet Take 1 tablet (7.5 mg) by mouth once daily at bedtime.      multivitamin with minerals (multivitamin) tablet Take 1 tablet by mouth once daily.      pantoprazole (ProtoNix) 40 mg EC  tablet Take 1 tablet (40 mg) by mouth once daily. 90 tablet 0    topiramate (Topamax Sprinkle) 25 mg capsule TAKE 2 CAPSULES (50 MG) BY MOUTH 2 TIMES A DAY. 120 capsule 0     Current Facility-Administered Medications   Medication Dose Route Frequency Provider Last Rate Last Admin    regadenoson (Lexiscan) injection 0.4 mg  0.4 mg intravenous Once Stanislav Pinon MD       [3]   Past Surgical History:  Procedure Laterality Date    ANKLE SURGERY      APPENDECTOMY      BREAST BIOPSY       SECTION, LOW TRANSVERSE      CHOLECYSTECTOMY      COLONOSCOPY      KNEE SURGERY      ORIF TIBIA FRACTURE

## 2025-05-31 DIAGNOSIS — D50.9 IRON DEFICIENCY ANEMIA, UNSPECIFIED IRON DEFICIENCY ANEMIA TYPE: ICD-10-CM

## 2025-06-02 RX ORDER — FERROUS SULFATE 325(65) MG
TABLET ORAL
Qty: 90 TABLET | Refills: 0 | Status: SHIPPED | OUTPATIENT
Start: 2025-06-02

## 2025-06-04 ENCOUNTER — APPOINTMENT (OUTPATIENT)
Facility: CLINIC | Age: 70
End: 2025-06-04
Payer: MEDICARE

## 2025-06-04 VITALS
RESPIRATION RATE: 18 BRPM | WEIGHT: 178.3 LBS | OXYGEN SATURATION: 94 % | BODY MASS INDEX: 24.15 KG/M2 | HEART RATE: 79 BPM | HEIGHT: 72 IN | DIASTOLIC BLOOD PRESSURE: 90 MMHG | SYSTOLIC BLOOD PRESSURE: 110 MMHG

## 2025-06-04 DIAGNOSIS — R06.02 SHORTNESS OF BREATH: ICD-10-CM

## 2025-06-04 DIAGNOSIS — R07.89 CHEST HEAVINESS: ICD-10-CM

## 2025-06-04 DIAGNOSIS — Z00.00 ENCOUNTER FOR GENERAL ADULT MEDICAL EXAMINATION WITHOUT ABNORMAL FINDINGS: ICD-10-CM

## 2025-06-04 DIAGNOSIS — J41.0 SIMPLE CHRONIC BRONCHITIS (MULTI): Primary | ICD-10-CM

## 2025-06-04 PROCEDURE — 1159F MED LIST DOCD IN RCRD: CPT

## 2025-06-04 PROCEDURE — 1036F TOBACCO NON-USER: CPT

## 2025-06-04 PROCEDURE — G8433 SCR FOR DEP NOT CPT DOC RSN: HCPCS

## 2025-06-04 PROCEDURE — 3008F BODY MASS INDEX DOCD: CPT

## 2025-06-04 PROCEDURE — 3080F DIAST BP >= 90 MM HG: CPT

## 2025-06-04 PROCEDURE — 3074F SYST BP LT 130 MM HG: CPT

## 2025-06-04 PROCEDURE — 99204 OFFICE O/P NEW MOD 45 MIN: CPT

## 2025-06-04 RX ORDER — ALBUTEROL SULFATE 90 UG/1
1 INHALANT RESPIRATORY (INHALATION) ONCE
OUTPATIENT
Start: 2025-06-04

## 2025-06-04 RX ORDER — ALBUTEROL SULFATE 0.83 MG/ML
3 SOLUTION RESPIRATORY (INHALATION) ONCE
OUTPATIENT
Start: 2025-06-04 | End: 2025-06-04

## 2025-06-04 RX ORDER — TOPIRAMATE SPINKLE 25 MG/1
50 CAPSULE ORAL 2 TIMES DAILY
Qty: 120 CAPSULE | Refills: 0 | Status: SHIPPED | OUTPATIENT
Start: 2025-06-04 | End: 2025-07-04

## 2025-06-04 RX ORDER — ALBUTEROL SULFATE 90 UG/1
2 INHALANT RESPIRATORY (INHALATION) EVERY 6 HOURS PRN
Qty: 8 G | Refills: 3 | Status: SHIPPED | OUTPATIENT
Start: 2025-06-04

## 2025-06-04 ASSESSMENT — COPD QUESTIONNAIRES
QUESTION4_WALKINCLINE: 4
QUESTION7_SLEEPQUALITY: 0 - I SLEEP SOUNDLY
QUESTION8_ENERGYLEVEL: 0 - I HAVE LOTS OF ENERGY
QUESTION5_HOMEACTIVITIES: 4
CAT_TOTALSCORE: 18
QUESTION6_LEAVINGHOUSE: 3
QUESTION3_CHESTTIGHTNESS: 3
QUESTION1_COUGHFREQUENCY: 4
QUESTION2_CHESTPHLEGM: 0 - I HAVE NO PHLEGM (MUCUS) IN MY CHEST AT ALL

## 2025-06-04 ASSESSMENT — ENCOUNTER SYMPTOMS
OCCASIONAL FEELINGS OF UNSTEADINESS: 1
LOSS OF SENSATION IN FEET: 1
DEPRESSION: 0

## 2025-06-04 ASSESSMENT — PATIENT HEALTH QUESTIONNAIRE - PHQ9
1. LITTLE INTEREST OR PLEASURE IN DOING THINGS: NOT AT ALL
2. FEELING DOWN, DEPRESSED OR HOPELESS: NOT AT ALL
SUM OF ALL RESPONSES TO PHQ9 QUESTIONS 1 AND 2: 0

## 2025-06-09 RX ORDER — REGADENOSON 0.08 MG/ML
0.4 INJECTION, SOLUTION INTRAVENOUS ONCE
Status: CANCELLED | OUTPATIENT
Start: 2025-06-09 | End: 2025-06-09

## 2025-06-10 DIAGNOSIS — R79.89 OTHER SPECIFIED ABNORMAL FINDINGS OF BLOOD CHEMISTRY: ICD-10-CM

## 2025-06-10 DIAGNOSIS — K21.9 GASTROESOPHAGEAL REFLUX DISEASE, UNSPECIFIED WHETHER ESOPHAGITIS PRESENT: ICD-10-CM

## 2025-06-10 RX ORDER — FENOFIBRATE 160 MG/1
160 TABLET ORAL DAILY
Qty: 90 TABLET | Refills: 0 | Status: SHIPPED | OUTPATIENT
Start: 2025-06-10

## 2025-06-10 RX ORDER — PANTOPRAZOLE SODIUM 40 MG/1
40 TABLET, DELAYED RELEASE ORAL DAILY
Qty: 90 TABLET | Refills: 0 | Status: SHIPPED | OUTPATIENT
Start: 2025-06-10

## 2025-06-19 ENCOUNTER — PATIENT OUTREACH (OUTPATIENT)
Dept: PRIMARY CARE | Facility: CLINIC | Age: 70
End: 2025-06-19

## 2025-06-19 ENCOUNTER — HOSPITAL ENCOUNTER (OUTPATIENT)
Dept: RADIOLOGY | Facility: HOSPITAL | Age: 70
Discharge: HOME | End: 2025-06-19
Payer: MEDICARE

## 2025-06-19 ENCOUNTER — HOSPITAL ENCOUNTER (OUTPATIENT)
Dept: CARDIOLOGY | Facility: HOSPITAL | Age: 70
Discharge: HOME | End: 2025-06-19
Payer: MEDICARE

## 2025-06-19 DIAGNOSIS — R07.9 CHEST PAIN, UNSPECIFIED TYPE: ICD-10-CM

## 2025-06-19 PROCEDURE — 2500000004 HC RX 250 GENERAL PHARMACY W/ HCPCS (ALT 636 FOR OP/ED): Performed by: INTERNAL MEDICINE

## 2025-06-19 PROCEDURE — A9502 TC99M TETROFOSMIN: HCPCS | Performed by: INTERNAL MEDICINE

## 2025-06-19 PROCEDURE — 93018 CV STRESS TEST I&R ONLY: CPT | Performed by: INTERNAL MEDICINE

## 2025-06-19 PROCEDURE — 3430000001 HC RX 343 DIAGNOSTIC RADIOPHARMACEUTICALS: Performed by: INTERNAL MEDICINE

## 2025-06-19 PROCEDURE — 78452 HT MUSCLE IMAGE SPECT MULT: CPT | Performed by: INTERNAL MEDICINE

## 2025-06-19 PROCEDURE — 93016 CV STRESS TEST SUPVJ ONLY: CPT | Performed by: INTERNAL MEDICINE

## 2025-06-19 PROCEDURE — 93017 CV STRESS TEST TRACING ONLY: CPT

## 2025-06-19 PROCEDURE — 78452 HT MUSCLE IMAGE SPECT MULT: CPT

## 2025-06-19 RX ORDER — REGADENOSON 0.08 MG/ML
0.4 INJECTION, SOLUTION INTRAVENOUS ONCE
Status: COMPLETED | OUTPATIENT
Start: 2025-06-19 | End: 2025-06-19

## 2025-06-19 RX ADMIN — TETROFOSMIN 35.4 MILLICURIE: 0.23 INJECTION, POWDER, LYOPHILIZED, FOR SOLUTION INTRAVENOUS at 12:00

## 2025-06-19 RX ADMIN — REGADENOSON 0.4 MG: 0.08 INJECTION, SOLUTION INTRAVENOUS at 11:38

## 2025-06-19 RX ADMIN — TETROFOSMIN 9 MILLICURIE: 0.23 INJECTION, POWDER, LYOPHILIZED, FOR SOLUTION INTRAVENOUS at 10:55

## 2025-06-19 NOTE — PROGRESS NOTES
If unable to contact patient:  Final call back to assess needs post discharge. Left voicemail for patient. Contact information provided.

## 2025-07-11 DIAGNOSIS — Z00.00 ENCOUNTER FOR GENERAL ADULT MEDICAL EXAMINATION WITHOUT ABNORMAL FINDINGS: ICD-10-CM

## 2025-07-11 DIAGNOSIS — E55.9 VITAMIN D DEFICIENCY: ICD-10-CM

## 2025-07-11 RX ORDER — ERGOCALCIFEROL 1.25 MG/1
1.25 CAPSULE ORAL
Qty: 12 CAPSULE | Refills: 0 | Status: SHIPPED | OUTPATIENT
Start: 2025-07-13 | End: 2025-10-11

## 2025-07-11 RX ORDER — TOPIRAMATE SPINKLE 25 MG/1
50 CAPSULE ORAL 2 TIMES DAILY
Qty: 360 CAPSULE | Refills: 0 | Status: SHIPPED | OUTPATIENT
Start: 2025-07-11 | End: 2025-08-10

## 2025-07-18 ENCOUNTER — APPOINTMENT (OUTPATIENT)
Dept: CARDIOLOGY | Facility: CLINIC | Age: 70
End: 2025-07-18
Payer: MEDICARE

## 2025-07-18 VITALS
HEIGHT: 71 IN | SYSTOLIC BLOOD PRESSURE: 122 MMHG | BODY MASS INDEX: 26.32 KG/M2 | DIASTOLIC BLOOD PRESSURE: 78 MMHG | WEIGHT: 188 LBS | OXYGEN SATURATION: 97 % | HEART RATE: 60 BPM

## 2025-07-18 DIAGNOSIS — F31.5 BIPOLAR I DISORDER, MOST RECENT EPISODE (OR CURRENT) DEPRESSED, SEVERE, SPECIFIED AS WITH PSYCHOTIC BEHAVIOR (MULTI): ICD-10-CM

## 2025-07-18 DIAGNOSIS — R07.9 CHEST PAIN, UNSPECIFIED TYPE: ICD-10-CM

## 2025-07-18 DIAGNOSIS — F41.9 ANXIETY: Primary | ICD-10-CM

## 2025-07-18 DIAGNOSIS — J44.9 CHRONIC OBSTRUCTIVE PULMONARY DISEASE, UNSPECIFIED COPD TYPE (MULTI): ICD-10-CM

## 2025-07-18 DIAGNOSIS — K21.9 GASTROESOPHAGEAL REFLUX DISEASE WITHOUT ESOPHAGITIS: ICD-10-CM

## 2025-07-18 PROCEDURE — 99213 OFFICE O/P EST LOW 20 MIN: CPT | Performed by: INTERNAL MEDICINE

## 2025-07-18 PROCEDURE — 3008F BODY MASS INDEX DOCD: CPT | Performed by: INTERNAL MEDICINE

## 2025-07-18 PROCEDURE — 3078F DIAST BP <80 MM HG: CPT | Performed by: INTERNAL MEDICINE

## 2025-07-18 PROCEDURE — 3074F SYST BP LT 130 MM HG: CPT | Performed by: INTERNAL MEDICINE

## 2025-07-18 PROCEDURE — 1160F RVW MEDS BY RX/DR IN RCRD: CPT | Performed by: INTERNAL MEDICINE

## 2025-07-18 PROCEDURE — 1159F MED LIST DOCD IN RCRD: CPT | Performed by: INTERNAL MEDICINE

## 2025-07-18 NOTE — ASSESSMENT & PLAN NOTE
6/19/25 Normal ST respone to pharmacologic nuclear cardiac stress ana with scan showing no inducible ischemia or prior infarction with LVEF > 65% likely form GERD

## 2025-07-18 NOTE — PROGRESS NOTES
"  Subjective  Mary Marsh  is a 69 y.o. year old female who presents for F/U Lexiscan.  Still having chest pain lkely form GERD    Blood pressure 122/78, pulse 60, height 1.803 m (5' 11\"), weight 85.3 kg (188 lb), SpO2 97%.   Amoxicillin, Egg, Lactose, and Nitrofurantoin monohyd/m-cryst  Medical History[1]  Surgical History[2]  Family History[3]  @SOC    Current Medications[4]     ROS  Review of Systems   All other systems reviewed and are negative.      Physical Exam  Physical Exam  Constitutional:       Appearance: Normal appearance.   HENT:      Head: Normocephalic and atraumatic.     Cardiovascular:      Rate and Rhythm: Normal rate and regular rhythm.   Pulmonary:      Comments: Decreased breath slounds throughout  Abdominal:      Palpations: Abdomen is soft.     Skin:     General: Skin is warm and dry.     Neurological:      General: No focal deficit present.      Mental Status: She is alert and oriented to person, place, and time.     Psychiatric:         Mood and Affect: Mood normal.         Behavior: Behavior normal.          EKG  Encounter Date: 05/29/25   ECG 12 lead (Clinic Performed)   Result Value    Ventricular Rate 64    Atrial Rate 64    KS Interval 152    QRS Duration 100    QT Interval 430    QTC Calculation(Bazett) 443    P Axis 65    R Axis 47    T Axis 58    QRS Count 11    Q Onset 220    P Onset 144    P Offset 207    T Offset 435    QTC Fredericia 439    Narrative    Normal sinus rhythm  T wave abnormality, consider anterior ischemia  Abnormal ECG  When compared with ECG of 24-MAR-2025 04:11,  No significant change was found  Confirmed by Stanislav Pinon ([id number]) on 5/29/2025 4:11:18 PM       Problem List Items Addressed This Visit       Anxiety - Primary    Chest pain    6/19/25 Normal ST respone to pharmacologic nuclear cardiac stress ana with scan showing no inducible ischemia or prior infarction with LVEF > 65% likely form GERD         Bipolar I disorder, most recent episode (or " current) depressed, severe, specified as with psychotic behavior (Multi)    Gastroesophageal reflux disease    Chronic obstructive pulmonary disease (Multi)         Follow up with Dr. Elvira Pinon MD        [1]   Past Medical History:  Diagnosis Date    Adrenal nodule     Anemia     Arthritis     Body mass index (BMI) 27.0-27.9, adult     BMI 27.0-27.9,adult    Cataract     Cerebral vascular accident (Multi)     Closed left hip fracture, initial encounter     s/p IMN    COPD (chronic obstructive pulmonary disease) (Multi)     Depression     Emphysema of lung (Multi)     Generalized pruritus     GERD (gastroesophageal reflux disease)     Hypertension     Irritable bowel syndrome     Migraines     Peripheral neuropathy     Right tibial fracture 2023    Urinary incontinence     Vision loss    [2]   Past Surgical History:  Procedure Laterality Date    ANKLE SURGERY      APPENDECTOMY      BREAST BIOPSY       SECTION, LOW TRANSVERSE      CHOLECYSTECTOMY      COLONOSCOPY      KNEE SURGERY      ORIF TIBIA FRACTURE     [3]   Family History  Problem Relation Name Age of Onset    Diabetes Mother      Hypertension Mother      Lung cancer Father          tobacco use    Cancer Sister      Lung cancer Brother          tobacco use    Breast cancer Mother's Sister     [4]   Current Outpatient Medications   Medication Sig Dispense Refill    albuterol (Ventolin HFA) 90 mcg/actuation inhaler Inhale 2 puffs every 6 hours if needed for wheezing. 8 g 3    amLODIPine (Norvasc) 10 mg tablet TAKE 1 TABLET BY MOUTH EVERY DAY 90 tablet 1    aspirin 81 mg EC tablet Take 1 tablet (81 mg) by mouth once daily.      benazepril (Lotensin) 40 mg tablet TAKE 1 TABLET BY MOUTH EVERY DAY 90 tablet 1    calcium carbonate-vitamin D3 600 mg-5 mcg (200 unit) tablet Take 1 tablet by mouth once daily.      dicyclomine (Bentyl) 10 mg capsule TAKE 1 CAPSULE (10 MG) BY MOUTH EVERY 6 HOURS IF NEEDED 360 capsule 1     ergocalciferol (Vitamin D-2) 1250 mcg (50,000 units) capsule Take 1 capsule (1.25 mg) by mouth 1 (one) time per week. 12 capsule 0    fenofibrate (Triglide) 160 mg tablet TAKE 1 TABLET BY MOUTH EVERY DAY 90 tablet 0    ferrous sulfate 325 mg (65 mg elemental) tablet TAKE 1 TABLET BY MOUTH EVERY DAY WITH FOOD NEED APPT FOR FURTHER REFILLS 90 tablet 0    FLUoxetine (PROzac) 20 mg capsule Take 2 capsules (40 mg) by mouth once daily.      Klor-Con M20 20 mEq ER tablet TAKE 1 TABLET BY MOUTH EVERY DAY 90 tablet 1    lactase (LACTAID ORAL) Take 1 tablet by mouth if needed.      lamoTRIgine (LaMICtal) 150 mg tablet Take 1 tablet (150 mg) by mouth once daily at bedtime.      LORazepam (Ativan) 1 mg tablet Take 1 tablet (1 mg) by mouth 2 times a day.      mirtazapine (Remeron) 7.5 mg tablet Take 1 tablet (7.5 mg) by mouth once daily at bedtime.      multivitamin with minerals (multivitamin) tablet Take 1 tablet by mouth once daily.      pantoprazole (ProtoNix) 40 mg EC tablet TAKE 1 TABLET BY MOUTH EVERY DAY 90 tablet 0    topiramate (Topamax Sprinkle) 25 mg capsule Take 2 capsules (50 mg) by mouth 2 times a day. 360 capsule 0     Current Facility-Administered Medications   Medication Dose Route Frequency Provider Last Rate Last Admin    regadenoson (Lexiscan) injection 0.4 mg  0.4 mg intravenous Once Stanislav Pinon MD

## 2025-07-21 ENCOUNTER — TELEPHONE (OUTPATIENT)
Facility: CLINIC | Age: 70
End: 2025-07-21
Payer: MEDICARE

## 2025-07-21 DIAGNOSIS — R79.89 OTHER SPECIFIED ABNORMAL FINDINGS OF BLOOD CHEMISTRY: ICD-10-CM

## 2025-07-21 NOTE — TELEPHONE ENCOUNTER
Patient is scheduled 7/22/25 at 4:20PM.     Testing was not done.  Patient needs to schedule PFTs and r/s with Susi for after Testing

## 2025-07-22 ENCOUNTER — APPOINTMENT (OUTPATIENT)
Facility: CLINIC | Age: 70
End: 2025-07-22
Payer: MEDICARE

## 2025-07-22 RX ORDER — FENOFIBRATE 160 MG/1
160 TABLET ORAL DAILY
Qty: 60 TABLET | Refills: 0 | Status: SHIPPED | OUTPATIENT
Start: 2025-07-22

## 2025-07-30 DIAGNOSIS — I10 HYPERTENSION, UNSPECIFIED TYPE: ICD-10-CM

## 2025-07-31 RX ORDER — AMLODIPINE BESYLATE 10 MG/1
10 TABLET ORAL DAILY
Qty: 90 TABLET | Refills: 0 | Status: SHIPPED | OUTPATIENT
Start: 2025-07-31

## 2025-08-25 ENCOUNTER — OFFICE VISIT (OUTPATIENT)
Dept: ORTHOPEDIC SURGERY | Facility: CLINIC | Age: 70
End: 2025-08-25
Payer: MEDICARE

## 2025-08-25 ENCOUNTER — HOSPITAL ENCOUNTER (OUTPATIENT)
Dept: RADIOLOGY | Facility: CLINIC | Age: 70
Discharge: HOME | End: 2025-08-25
Payer: MEDICARE

## 2025-08-25 DIAGNOSIS — M17.11 ARTHRITIS OF RIGHT KNEE: Primary | ICD-10-CM

## 2025-08-25 DIAGNOSIS — M25.561 RIGHT KNEE PAIN, UNSPECIFIED CHRONICITY: ICD-10-CM

## 2025-08-25 PROCEDURE — 73562 X-RAY EXAM OF KNEE 3: CPT | Mod: RIGHT SIDE | Performed by: RADIOLOGY

## 2025-08-25 PROCEDURE — 73562 X-RAY EXAM OF KNEE 3: CPT | Mod: RT

## 2025-08-25 PROCEDURE — 1159F MED LIST DOCD IN RCRD: CPT | Performed by: ORTHOPAEDIC SURGERY

## 2025-08-25 PROCEDURE — 99212 OFFICE O/P EST SF 10 MIN: CPT | Performed by: ORTHOPAEDIC SURGERY

## 2025-08-25 PROCEDURE — 20610 DRAIN/INJ JOINT/BURSA W/O US: CPT | Mod: RT | Performed by: ORTHOPAEDIC SURGERY

## 2025-08-25 PROCEDURE — 99204 OFFICE O/P NEW MOD 45 MIN: CPT | Performed by: ORTHOPAEDIC SURGERY

## 2025-08-25 PROCEDURE — 2500000004 HC RX 250 GENERAL PHARMACY W/ HCPCS (ALT 636 FOR OP/ED): Performed by: ORTHOPAEDIC SURGERY

## 2025-08-25 RX ORDER — TRIAMCINOLONE ACETONIDE 40 MG/ML
1 INJECTION, SUSPENSION INTRA-ARTICULAR; INTRAMUSCULAR
Status: COMPLETED | OUTPATIENT
Start: 2025-08-25 | End: 2025-08-25

## 2025-08-25 RX ORDER — LIDOCAINE HYDROCHLORIDE 10 MG/ML
4 INJECTION, SOLUTION INFILTRATION; PERINEURAL
Status: COMPLETED | OUTPATIENT
Start: 2025-08-25 | End: 2025-08-25

## 2025-08-25 RX ADMIN — TRIAMCINOLONE ACETONIDE 1 ML: 40 INJECTION, SUSPENSION INTRA-ARTICULAR; INTRAMUSCULAR at 11:10

## 2025-08-25 RX ADMIN — LIDOCAINE HYDROCHLORIDE 4 ML: 10 INJECTION, SOLUTION INFILTRATION; PERINEURAL at 11:10

## 2025-08-28 DIAGNOSIS — D50.9 IRON DEFICIENCY ANEMIA, UNSPECIFIED IRON DEFICIENCY ANEMIA TYPE: ICD-10-CM

## 2025-08-28 RX ORDER — FERROUS SULFATE 325(65) MG
TABLET ORAL
Qty: 90 TABLET | Refills: 0 | Status: SHIPPED | OUTPATIENT
Start: 2025-08-28

## (undated) DEVICE — DRAPE, SHEET, THREE QUARTER, FAN FOLD, 57 X 77 IN

## (undated) DEVICE — Device

## (undated) DEVICE — COVER, BACK TABLE, 65 X 90, HVY REINFORCED

## (undated) DEVICE — SUTURE, VICRYL, 2-0, 27 IN, FSL, UNDYED

## (undated) DEVICE — DRAPE, SHEET, U, STERI DRAPE, 47 X 51 IN, DISPOSABLE, STERILE

## (undated) DEVICE — BANDAGE, ELASTIC, MATRIX, SELF-CLOSURE, 6 IN X 5 YD, LF

## (undated) DEVICE — WIRE, K 3 X 285 FIXAT COATED

## (undated) DEVICE — DRAPE COVER, C ARM, FLOUROSCAN IMAGING SYS

## (undated) DEVICE — APPLICATOR, PREP, CHLORAPREP, W/ORANGE TINT, 10.5ML

## (undated) DEVICE — TOWEL, SURGICAL, NEURO, O/R, 16 X 26, BLUE, STERILE

## (undated) DEVICE — PROTECTOR, NERVE, ULNAR, PINK

## (undated) DEVICE — COVER, CART, 45 X 27 X 48 IN, CLEAR

## (undated) DEVICE — BANDAGE, ELASTIC, ACE, ACE, DOUBLE LENGTH, 6 X 550 IN, LF

## (undated) DEVICE — SLEEVE, INSERT ELAST NAIL SPI 8-11

## (undated) DEVICE — SUTURE, VICRYL, 2-0, 27 IN, FS-1, UNDYED

## (undated) DEVICE — BANDAGE, COFLEX, 4 X 5 YDS, TAN, STERILE, LF

## (undated) DEVICE — DRAPE, TOWEL, STERI DRAPE, 17 X 11 IN, PLASTIC, STERILE

## (undated) DEVICE — MANIFOLD, 4 PORT NEPTUNE STANDARD

## (undated) DEVICE — SUTURE, VICRYL, 1, 27 IN, CT-1, VIOLET

## (undated) DEVICE — STAPLER, SKIN PROXIMATE, 35 WIDE

## (undated) DEVICE — BANDAGE, GAUZE, CONFORMING, KERLIX, 6 PLY, 4.5 IN X 4.1 YD

## (undated) DEVICE — COVER, C-ARM W/CLIPS, OEC GE

## (undated) DEVICE — DRILL, LOCKING, 4.2 X 360MM